# Patient Record
Sex: FEMALE | Race: WHITE | NOT HISPANIC OR LATINO | Employment: OTHER | URBAN - METROPOLITAN AREA
[De-identification: names, ages, dates, MRNs, and addresses within clinical notes are randomized per-mention and may not be internally consistent; named-entity substitution may affect disease eponyms.]

---

## 2019-10-04 DIAGNOSIS — G89.4 CHRONIC PAIN SYNDROME: ICD-10-CM

## 2019-10-04 DIAGNOSIS — R10.9 ABDOMINAL CRAMPING: Primary | ICD-10-CM

## 2019-10-04 DIAGNOSIS — I10 HYPERTENSION, UNSPECIFIED TYPE: ICD-10-CM

## 2019-10-04 DIAGNOSIS — L02.92 BOIL: ICD-10-CM

## 2019-10-04 RX ORDER — OXYCODONE HYDROCHLORIDE 15 MG/1
15 TABLET ORAL EVERY 6 HOURS PRN
Qty: 120 TABLET | Refills: 0 | Status: SHIPPED | OUTPATIENT
Start: 2019-10-04 | End: 2019-11-05 | Stop reason: SDUPTHER

## 2019-10-04 RX ORDER — CLINDAMYCIN HYDROCHLORIDE 300 MG/1
300 CAPSULE ORAL EVERY 8 HOURS
Qty: 30 CAPSULE | Refills: 0 | Status: SHIPPED | OUTPATIENT
Start: 2019-10-04 | End: 2019-10-14

## 2019-10-04 RX ORDER — LISINOPRIL 20 MG/1
20 TABLET ORAL DAILY
Qty: 90 TABLET | Refills: 0 | Status: SHIPPED | OUTPATIENT
Start: 2019-10-04 | End: 2019-11-05 | Stop reason: SDUPTHER

## 2019-10-04 RX ORDER — LISINOPRIL 20 MG/1
1 TABLET ORAL DAILY
COMMUNITY
End: 2019-10-04 | Stop reason: SDUPTHER

## 2019-10-04 RX ORDER — DICYCLOMINE HYDROCHLORIDE 20 MG/1
20 TABLET ORAL 3 TIMES DAILY
Qty: 21 TABLET | Refills: 0 | Status: SHIPPED | OUTPATIENT
Start: 2019-10-04 | End: 2019-10-11

## 2019-10-04 RX ORDER — OXYCODONE HYDROCHLORIDE 15 MG/1
1 TABLET ORAL EVERY 4 HOURS PRN
COMMUNITY
End: 2019-10-04 | Stop reason: SDUPTHER

## 2019-10-04 NOTE — TELEPHONE ENCOUNTER
----- Message from Genie Sanchez sent at 10/4/2019 10:00 AM CDT -----  Contact: Marie Damon  Patient has a big boil on her side and she would like to know can she get something called in for that along with her refills on : Oxycodone 15 MG, Lisinopril 20MG and Dicyclomine 20MG tablet  [facility] in [place]  Pt# [phone]

## 2019-10-04 NOTE — TELEPHONE ENCOUNTER
Patient c/o boil on her side, is in pain.  Has to lay on her left side.  Waiting for boil to come to head so she can squeeze it.  On her hip near her buttock.  Taking extra pain medicine due to pain from boil and having to lay on her left side.  Size of 3 quarters together.  Is hard, hurts to touch.  x1 week.  Started as a small knot.  No fever. To dr winston for review -DN

## 2019-10-04 NOTE — TELEPHONE ENCOUNTER
Sent clinda for boil. Needs to use her pain meds as prescribed. Will not be sending any increased dose.

## 2019-10-15 ENCOUNTER — TELEPHONE (OUTPATIENT)
Dept: FAMILY MEDICINE | Facility: CLINIC | Age: 44
End: 2019-10-15

## 2019-10-15 NOTE — TELEPHONE ENCOUNTER
----- Message from Kimberli Richardson sent at 10/15/2019 11:34 AM CDT -----  Has a question about her appointment

## 2019-11-05 DIAGNOSIS — G89.4 CHRONIC PAIN SYNDROME: ICD-10-CM

## 2019-11-05 DIAGNOSIS — F41.9 ANXIETY: Primary | ICD-10-CM

## 2019-11-05 DIAGNOSIS — I10 HYPERTENSION, UNSPECIFIED TYPE: ICD-10-CM

## 2019-11-05 RX ORDER — DIAZEPAM 10 MG/1
10 TABLET ORAL 3 TIMES DAILY
Refills: 2 | COMMUNITY
Start: 2019-09-18 | End: 2019-11-05 | Stop reason: SDUPTHER

## 2019-11-05 RX ORDER — OXYCODONE HYDROCHLORIDE 15 MG/1
15 TABLET ORAL EVERY 6 HOURS PRN
Qty: 120 TABLET | Refills: 0 | Status: SHIPPED | OUTPATIENT
Start: 2019-11-05 | End: 2019-11-26 | Stop reason: SDUPTHER

## 2019-11-05 RX ORDER — DIAZEPAM 10 MG/1
10 TABLET ORAL 3 TIMES DAILY
Qty: 90 TABLET | Refills: 0 | Status: SHIPPED | OUTPATIENT
Start: 2019-11-05 | End: 2019-11-26 | Stop reason: SDUPTHER

## 2019-11-05 RX ORDER — LISINOPRIL 20 MG/1
20 TABLET ORAL DAILY
Qty: 90 TABLET | Refills: 0 | Status: SHIPPED | OUTPATIENT
Start: 2019-11-05 | End: 2020-01-03 | Stop reason: SDUPTHER

## 2019-11-05 NOTE — TELEPHONE ENCOUNTER
----- Message from Anderson Iqbal sent at 11/4/2019  9:22 AM CST -----  Pt was schedule for tomorrow but her mom fell and is in the hosp she rescheduled for next week. Pt is due for refills valium, bp med, oxycodone please call in   [facility] [place]  Pt [phone]

## 2019-11-06 NOTE — TELEPHONE ENCOUNTER
The patient's prescription has been approved and sent to   [facility] - [facility] - [address]  [address]  [place] [address]  Phone: [phone] Fax: [phone]

## 2019-11-15 ENCOUNTER — TELEPHONE (OUTPATIENT)
Dept: FAMILY MEDICINE | Facility: CLINIC | Age: 44
End: 2019-11-15

## 2019-11-15 NOTE — TELEPHONE ENCOUNTER
Spoke with pt - Pt informed that she has not been seen since 7/2019 and in accordance with medicaid and clinic policies she needs to bees seen every 3 months if she is taking controlled medications. Pt states that she will be in on 11/26/19 for her appointment because her aunt will be down to help take of her mother who is now staying with her.

## 2019-11-26 ENCOUNTER — OFFICE VISIT (OUTPATIENT)
Dept: FAMILY MEDICINE | Facility: CLINIC | Age: 44
End: 2019-11-26
Payer: MEDICAID

## 2019-11-26 VITALS
HEIGHT: 72 IN | SYSTOLIC BLOOD PRESSURE: 130 MMHG | OXYGEN SATURATION: 98 % | BODY MASS INDEX: 27.82 KG/M2 | DIASTOLIC BLOOD PRESSURE: 86 MMHG | HEART RATE: 90 BPM | WEIGHT: 205.38 LBS

## 2019-11-26 DIAGNOSIS — I10 ESSENTIAL HYPERTENSION: ICD-10-CM

## 2019-11-26 DIAGNOSIS — R11.2 NON-INTRACTABLE VOMITING WITH NAUSEA, UNSPECIFIED VOMITING TYPE: ICD-10-CM

## 2019-11-26 DIAGNOSIS — G89.4 CHRONIC PAIN SYNDROME: ICD-10-CM

## 2019-11-26 DIAGNOSIS — R10.30 LOWER ABDOMINAL PAIN: ICD-10-CM

## 2019-11-26 DIAGNOSIS — F41.9 ANXIETY: ICD-10-CM

## 2019-11-26 LAB
BILIRUB UR QL STRIP: POSITIVE
GLUCOSE UR QL STRIP: NEGATIVE
KETONES UR QL STRIP: POSITIVE
LEUKOCYTE ESTERASE UR QL STRIP: NEGATIVE
PH, POC UA: 6
POC BLOOD, URINE: POSITIVE
POC NITRATES, URINE: NEGATIVE
PROT UR QL STRIP: POSITIVE
SP GR UR STRIP: 1.02 (ref 1–1.03)
UROBILINOGEN UR STRIP-ACNC: ABNORMAL (ref 0.1–1.1)

## 2019-11-26 PROCEDURE — 81003 POCT URINALYSIS, DIPSTICK, AUTOMATED, W/O SCOPE: ICD-10-PCS | Mod: 59,QW,S$GLB, | Performed by: FAMILY MEDICINE

## 2019-11-26 PROCEDURE — 99214 OFFICE O/P EST MOD 30 MIN: CPT | Mod: 25,S$GLB,, | Performed by: FAMILY MEDICINE

## 2019-11-26 PROCEDURE — 99214 PR OFFICE/OUTPT VISIT, EST, LEVL IV, 30-39 MIN: ICD-10-PCS | Mod: 25,S$GLB,, | Performed by: FAMILY MEDICINE

## 2019-11-26 PROCEDURE — 81003 URINALYSIS AUTO W/O SCOPE: CPT | Mod: 59,QW,S$GLB, | Performed by: FAMILY MEDICINE

## 2019-11-26 RX ORDER — PROMETHAZINE HYDROCHLORIDE 25 MG/ML
25 INJECTION, SOLUTION INTRAMUSCULAR; INTRAVENOUS
Status: COMPLETED | OUTPATIENT
Start: 2019-11-26 | End: 2019-11-26

## 2019-11-26 RX ORDER — LISINOPRIL 10 MG/1
10 TABLET ORAL DAILY
COMMUNITY
End: 2019-11-26 | Stop reason: SDUPTHER

## 2019-11-26 RX ORDER — NALOXONE HYDROCHLORIDE 4 MG/.1ML
SPRAY NASAL
COMMUNITY
End: 2022-01-11 | Stop reason: SDUPTHER

## 2019-11-26 RX ADMIN — PROMETHAZINE HYDROCHLORIDE 25 MG: 25 INJECTION, SOLUTION INTRAMUSCULAR; INTRAVENOUS at 09:11

## 2019-11-26 NOTE — PROGRESS NOTES
SUBJECTIVE:    Patient ID: Marie Damon is a 44 y.o. female.    Chief Complaint: Hypertension (check up); Fatigue (x 3 days); and Nausea (w/ vomitting x 3 days)    Pt here to checkup on chronic conditions.    BP is doing ok. Denies CP/SOB.     Anxiety is high.  Has a lot of stress going on.  Overwhelmed with a lot with her mom and her son.        Pt states she mad a mistake while sick looking for her meds in the middle of the night and took her mom's morphine pill and it made her throw up.  zofran has not been helping her get any better.  Had some issues last visit with stomach, but was relieved by dicyclomine and zofran a that time.  This episode seems different.   Pt reports cysts on the ovaries in the past.    No weight loss.    Pt continues to have chronic back pain. Takes oxycodone as needed for pain.  Medication allows her to do ADLs as well as helps her to take care of her total care mother and help her son that has leg amputation.     Abdominal Pain   This is a new problem. The current episode started in the past 7 days. The onset quality is sudden. The problem occurs constantly (waxes and wanes). The problem has been unchanged. Pain location: lower abdomen. The pain is at a severity of 9/10. The pain is moderate. The quality of the pain is colicky (last ate yesterday just to have something on her stomach). Associated symptoms include anorexia, belching, a fever (last night 103 F), headaches, nausea and vomiting. Pertinent negatives include no arthralgias, constipation, diarrhea, dysuria, frequency or myalgias. Associated symptoms comments: Has not slept much since this started. Relieved by: ibuprofen. The treatment provided no relief.           Past Medical History:   Diagnosis Date    Bone spur     hip     Past Surgical History:   Procedure Laterality Date    CHOLECYSTECTOMY      HYSTERECTOMY       History reviewed. No pertinent family history.    Marital Status:   Alcohol History:  reports  that she does not drink alcohol.  Tobacco History:  reports that she has been smoking cigarettes. She has never used smokeless tobacco.  Drug History:  reports that she does not use drugs.    Review of patient's allergies indicates:   Allergen Reactions    Pcn [penicillins] Hives    Morphine Other (See Comments)     heartburn       Current Outpatient Medications:     diazePAM (VALIUM) 10 MG Tab, Take 1 tablet (10 mg total) by mouth 3 (three) times daily., Disp: 90 tablet, Rfl: 0    lisinopril 10 MG tablet, Take 1 tablet (10 mg total) by mouth once daily., Disp: 90 tablet, Rfl: 1    naloxone (NARCAN) 4 mg/actuation Spry, each nostril if not breathing or not responsive, Disp: , Rfl:     [START ON 12/4/2019] oxyCODONE (ROXICODONE) 15 MG Tab, Take 1 tablet (15 mg total) by mouth every 6 (six) hours as needed., Disp: 120 tablet, Rfl: 0    gabapentin (NEURONTIN) 400 MG capsule, Take 400 mg by mouth 2 (two) times daily as needed (pain from bone spur on hip)., Disp: , Rfl:     HYDROcodone-acetaminophen (NORCO)  mg per tablet, Take 1 tablet by mouth every 4 (four) hours as needed for Pain., Disp: 18 tablet, Rfl: 0    ibuprofen (ADVIL,MOTRIN) 800 MG tablet, Take 1 tablet (800 mg total) by mouth every 6 (six) hours as needed for Pain., Disp: 30 tablet, Rfl: 0    lisinopril (PRINIVIL,ZESTRIL) 20 MG tablet, Take 1 tablet (20 mg total) by mouth once daily. (Patient taking differently: Take 10 mg by mouth once daily. ), Disp: 90 tablet, Rfl: 0    promethazine (PHENERGAN) 25 MG tablet, Take 1 tablet (25 mg total) by mouth every 6 (six) hours as needed for Nausea., Disp: 30 tablet, Rfl: 0    Review of Systems   Constitutional: Positive for fever (last night 103 F). Negative for appetite change, fatigue and unexpected weight change.   Respiratory: Negative for cough, chest tightness, shortness of breath and wheezing.    Cardiovascular: Negative for chest pain and leg swelling.   Gastrointestinal: Positive for  abdominal pain, anorexia, nausea and vomiting. Negative for constipation and diarrhea.        -heartburn   Genitourinary: Negative for difficulty urinating, dysuria, frequency and urgency.   Musculoskeletal: Positive for back pain. Negative for arthralgias, myalgias and neck pain.   Skin: Negative for rash.   Neurological: Positive for headaches. Negative for dizziness, weakness and numbness.   Hematological: Does not bruise/bleed easily.   Psychiatric/Behavioral: Positive for sleep disturbance (due to pain and viral symptoms). Negative for dysphoric mood and suicidal ideas. The patient is nervous/anxious.    All other systems reviewed and are negative.         Objective:      Vitals:    11/26/19 0836   BP: 130/86   Pulse: 90   SpO2: 98%   Weight: 93.2 kg (205 lb 6.4 oz)   Height: 6' (1.829 m)     Body mass index is 27.86 kg/m².  Physical Exam   Constitutional: She is oriented to person, place, and time. She appears well-developed and well-nourished. No distress.   overweight   HENT:   Head: Normocephalic and atraumatic.   Neck: Neck supple. No thyromegaly present.   Cardiovascular: Normal rate, regular rhythm and normal heart sounds. Exam reveals no friction rub.   No murmur heard.  Pulmonary/Chest: Effort normal and breath sounds normal. She has no wheezes. She has no rales.   Abdominal: Soft. She exhibits no distension. Bowel sounds are decreased. There is no hepatosplenomegaly. There is tenderness in the suprapubic area. There is no rebound and no guarding.   Musculoskeletal: She exhibits no edema.   Lymphadenopathy:     She has no cervical adenopathy.   Neurological: She is alert and oriented to person, place, and time.   Skin: Skin is warm and dry. No rash noted.   Psychiatric: She has a normal mood and affect. Her speech is normal and behavior is normal. Judgment and thought content normal. She is attentive.   Vitals reviewed.        Assessment:       1. Lower abdominal pain    2. Non-intractable vomiting  with nausea, unspecified vomiting type    3. Essential hypertension    4. Chronic pain syndrome    5. Anxiety         Plan:       Lower abdominal pain  Comments:  Acute. Will check for UTI. Hx ovarian cyst. Will also get pelvic ultrasound  Orders:  -     POCT Urinalysis, Dipstick, Automated, W/O Scope  -     Urine culture; Future; Expected date: 11/26/2019  -     US Pelvis Complete Non OB; Future; Expected date: 11/26/2019    Non-intractable vomiting with nausea, unspecified vomiting type  Comments:  Acute. Will treat with phenergan as zofran no longer effective. Pt to go to ER if unable to maintain hydration for any reason  Orders:  -     promethazine (PHENERGAN) 25 MG tablet; Take 1 tablet (25 mg total) by mouth every 6 (six) hours as needed for Nausea.  Dispense: 30 tablet; Refill: 0  -     promethazine injection 25 mg    Essential hypertension  Comments:  Blood pressure controlled. Will continue to monitor.  Orders:  -     lisinopril 10 MG tablet; Take 1 tablet (10 mg total) by mouth once daily.  Dispense: 90 tablet; Refill: 1    Chronic pain syndrome  Comments:  Back pain controlled. Will continue to monitor pain control and function.  Orders:  -     oxyCODONE (ROXICODONE) 15 MG Tab; Take 1 tablet (15 mg total) by mouth every 6 (six) hours as needed.  Dispense: 120 tablet; Refill: 0  -     Misc Sendout Test, Non-Blood Non -Blood Pain Mgmnt Drug Tox Monitoring 1; [facility] Code 36060; Future; Expected date: 11/26/2019  -     Urine culture; Future; Expected date: 11/26/2019    Anxiety  Comments:  Controlled. Will continue to monitor symtpoms  Orders:  -     diazePAM (VALIUM) 10 MG Tab; Take 1 tablet (10 mg total) by mouth 3 (three) times daily.  Dispense: 90 tablet; Refill: 0    Chronic Pain Notes:  Have discussed the risks and benefits of chronic narcotic therapy including pain control, dependence, and addiction.  The patient is aware and accepts the risks and benefits.  Have evaluated the patient's risk for  naloxone therapy.  The patient is at moderate risk for an adverse event with narcotic therapy due to concurrent benzodiazepine prescription, so a naloxone prescription has been given with written and verbal instructions. Patient is aware of the risk of taking narcotics combined with benzodiazepines/muscle relaxers/hypnotics, including but not limited to breathing difficulties, coma, and death; accepts the risks; and agrees to use medications only as prescribed.    Follow up in about 3 months (around 2/26/2020) for HTN, Anxiety.

## 2019-11-27 RX ORDER — OXYCODONE HYDROCHLORIDE 15 MG/1
15 TABLET ORAL EVERY 6 HOURS PRN
Qty: 120 TABLET | Refills: 0 | Status: SHIPPED | OUTPATIENT
Start: 2019-12-04 | End: 2020-01-03 | Stop reason: SDUPTHER

## 2019-11-27 RX ORDER — LISINOPRIL 10 MG/1
10 TABLET ORAL DAILY
Qty: 90 TABLET | Refills: 1 | Status: SHIPPED | OUTPATIENT
Start: 2019-11-27 | End: 2020-01-03 | Stop reason: SDUPTHER

## 2019-11-27 RX ORDER — DIAZEPAM 10 MG/1
10 TABLET ORAL 3 TIMES DAILY
Qty: 90 TABLET | Refills: 0 | Status: SHIPPED | OUTPATIENT
Start: 2019-11-30 | End: 2020-01-03 | Stop reason: SDUPTHER

## 2019-11-27 RX ORDER — PROMETHAZINE HYDROCHLORIDE 25 MG/1
25 TABLET ORAL EVERY 6 HOURS PRN
Qty: 30 TABLET | Refills: 0 | Status: SHIPPED | OUTPATIENT
Start: 2019-11-27 | End: 2020-04-02 | Stop reason: SDUPTHER

## 2019-12-03 ENCOUNTER — TELEPHONE (OUTPATIENT)
Dept: FAMILY MEDICINE | Facility: CLINIC | Age: 44
End: 2019-12-03

## 2019-12-03 DIAGNOSIS — Z79.899 ENCOUNTER FOR LONG-TERM (CURRENT) USE OF HIGH-RISK MEDICATION: Primary | ICD-10-CM

## 2019-12-03 DIAGNOSIS — R10.30 LOWER ABDOMINAL PAIN: ICD-10-CM

## 2019-12-03 DIAGNOSIS — G89.4 CHRONIC PAIN SYNDROME: ICD-10-CM

## 2019-12-03 DIAGNOSIS — F41.9 ANXIETY: ICD-10-CM

## 2019-12-03 NOTE — TELEPHONE ENCOUNTER
This patient had urine tests ordered and collected on 11/26. [employer] did not pickup the specimens on that day. The patient needs to recollect at a [facility] near to where she lives in [place].  I have been unable to reach her or her mother.  They both have not set up their VM.

## 2019-12-03 NOTE — TELEPHONE ENCOUNTER
Dr Sun, Please order whichever PM screen you wish to order. She had  Done the oral screen.  Mother Yazmin says that she will give her the message to have her urine labs recollected.  I emphasized that she has to have the tests done soon.   Her next OV is in March.  Dr Sun says that she must have labs redone before that OV.

## 2019-12-12 ENCOUNTER — TELEPHONE (OUTPATIENT)
Dept: FAMILY MEDICINE | Facility: CLINIC | Age: 44
End: 2019-12-12

## 2019-12-23 NOTE — TELEPHONE ENCOUNTER
Spoke to patient. She will go to [facility] in [facility].  RemindMe created//ba

## 2020-01-03 DIAGNOSIS — F41.9 ANXIETY: ICD-10-CM

## 2020-01-03 DIAGNOSIS — I10 ESSENTIAL HYPERTENSION: ICD-10-CM

## 2020-01-03 DIAGNOSIS — G89.4 CHRONIC PAIN SYNDROME: ICD-10-CM

## 2020-01-03 RX ORDER — LISINOPRIL 10 MG/1
10 TABLET ORAL DAILY
Qty: 90 TABLET | Refills: 1 | Status: SHIPPED | OUTPATIENT
Start: 2020-01-03 | End: 2020-01-30 | Stop reason: SDUPTHER

## 2020-01-03 RX ORDER — DIAZEPAM 10 MG/1
10 TABLET ORAL 3 TIMES DAILY
Qty: 90 TABLET | Refills: 0 | Status: SHIPPED | OUTPATIENT
Start: 2020-01-03 | End: 2020-01-30 | Stop reason: SDUPTHER

## 2020-01-03 RX ORDER — OXYCODONE HYDROCHLORIDE 15 MG/1
15 TABLET ORAL EVERY 6 HOURS PRN
Qty: 120 TABLET | Refills: 0 | Status: SHIPPED | OUTPATIENT
Start: 2020-01-03 | End: 2020-01-30 | Stop reason: SDUPTHER

## 2020-01-03 NOTE — TELEPHONE ENCOUNTER
The patient's prescription has been approved and sent to   [facility] - [facility] - [address]  [address]  [place] [address]  Phone: [phone] Fax: [phone]

## 2020-01-03 NOTE — TELEPHONE ENCOUNTER
----- Message from Trista Crespo sent at 1/3/2020  9:05 AM CST -----  Refill for Oxycodone, Lisinopril, Valium. [facility] in [place]. Pt #[phone]

## 2020-01-08 LAB
AMPHETAMINES UR QL SCN>1000 NG/ML: NEGATIVE
APPEARANCE UR: CLEAR
BACTERIA #/AREA URNS HPF: ABNORMAL /HPF
BACTERIA UR CULT: ABNORMAL
BACTERIA UR CULT: NORMAL
BARBITURATES UR QL: NEGATIVE
BENZODIAZ UR QL: POSITIVE
BILIRUB UR QL STRIP: NEGATIVE
BZE UR QL: NEGATIVE
CODEINE UR QL: ABNORMAL
COLOR UR: ABNORMAL
COMMENT: ABNORMAL
ETHANOL UR QL: NEGATIVE
GLUCOSE UR QL STRIP: NEGATIVE
HGB UR QL STRIP: NEGATIVE
HYALINE CASTS #/AREA URNS LPF: ABNORMAL /LPF
HYDROCODONE UR QL: ABNORMAL
HYDROMORPHONE UR QL: ABNORMAL
KETONES UR QL STRIP: NEGATIVE
LEUKOCYTE ESTERASE UR QL STRIP: ABNORMAL
METHADONE UR QL: NEGATIVE
METHAQUALONE UR QL: NEGATIVE
MORPHINE UR QL: POSITIVE
NITRITE UR QL STRIP: POSITIVE
OPIATES UR QL: POSITIVE
PCP UR QL: NEGATIVE
PH UR STRIP: 6.5 [PH] (ref 5–8)
PROPOXYPH UR QL: NEGATIVE
PROT UR QL STRIP: ABNORMAL
RBC #/AREA URNS HPF: ABNORMAL /HPF
SERVICE CMNT 02-IMP: ABNORMAL
SERVICE CMNT-IMP: ABNORMAL
SP GR UR STRIP: 1.02 (ref 1–1.03)
SQUAMOUS #/AREA URNS HPF: ABNORMAL /HPF
THC UR QL SCN>20 NG/ML: NEGATIVE
WBC #/AREA URNS HPF: ABNORMAL /HPF

## 2020-01-13 ENCOUNTER — TELEPHONE (OUTPATIENT)
Dept: FAMILY MEDICINE | Facility: CLINIC | Age: 45
End: 2020-01-13

## 2020-01-13 NOTE — TELEPHONE ENCOUNTER
----- Message from Daisha Peters sent at 1/13/2020  9:52 AM CST -----  Patient calling stating she needs some papers for disability stating what's wrong with her and medication she is taking please give the patient a call [phone]

## 2020-01-13 NOTE — TELEPHONE ENCOUNTER
Spoke with pt - Pt states that she needs papers sent to social security stating her disability and other information. Pt informed that she would need social security to fax over a request for records or she can drop off a form stating what social security needs. Pt verbalized and understanding and provide with the office fax number.

## 2020-01-14 ENCOUNTER — TELEPHONE (OUTPATIENT)
Dept: FAMILY MEDICINE | Facility: CLINIC | Age: 45
End: 2020-01-14

## 2020-01-14 NOTE — TELEPHONE ENCOUNTER
----- Message from Genie Sanchez sent at 1/14/2020  8:51 AM CST -----  Contact: Marie Damon  Pt says that she messed up her disability form with her son's insurance . She says that she instead needs for dr. Sun to just fill out a paper stating what medications she's on and the reasons why she's on them and then mail it to the patient please.   Pt# [phone]

## 2020-01-14 NOTE — TELEPHONE ENCOUNTER
Spoke with pt - pt audrey that she doesn't need a form filled out for disability but rather a letter stating her current medications with the reason for the medication. Pt states that we can mail the letter to her so she can take to her appointment.

## 2020-01-14 NOTE — TELEPHONE ENCOUNTER
Called pt, states she needs a letter for disability on 27th. Told her that they don't need a lot. All they need is a letter stating what she takes and why she takes them.    Let pt know this is not standard procedure for disability paperwork.  She can call [facility] and have send us what they need.     Pt states she is going to come by and  her AVS.

## 2020-01-15 ENCOUNTER — TELEPHONE (OUTPATIENT)
Dept: FAMILY MEDICINE | Facility: CLINIC | Age: 45
End: 2020-01-15

## 2020-01-15 NOTE — TELEPHONE ENCOUNTER
----- Message from Marleen Sun MD sent at 1/14/2020  7:08 PM CST -----  Please call the patient regarding her abnormal result.    1. Has pt been having some issues with urinary symptoms still? Showing signs of on U/A

## 2020-01-15 NOTE — TELEPHONE ENCOUNTER
Pt returned call - Pt asked about any uti symptoms that she may be having because her urinalysis still showed signs of infection. Pt stated that she isn't having any symptoms of a uti.    Pt stated that she called [facility] in [place] to have her u/s done but they stated they didn't have any orders. Pt asked me to call her back when the orders for u/s are in. Pt call back # [phone]

## 2020-01-15 NOTE — TELEPHONE ENCOUNTER
Please call the patient regarding her abnormal result.    1. Has pt been having some issues with urinary symptoms still? Showing signs of on U/A

## 2020-01-30 DIAGNOSIS — F41.9 ANXIETY: ICD-10-CM

## 2020-01-30 DIAGNOSIS — G89.4 CHRONIC PAIN SYNDROME: ICD-10-CM

## 2020-01-30 DIAGNOSIS — I10 ESSENTIAL HYPERTENSION: ICD-10-CM

## 2020-01-30 RX ORDER — OXYCODONE HYDROCHLORIDE 15 MG/1
15 TABLET ORAL EVERY 6 HOURS PRN
Qty: 120 TABLET | Refills: 0 | Status: SHIPPED | OUTPATIENT
Start: 2020-02-03 | End: 2020-03-02 | Stop reason: SDUPTHER

## 2020-01-30 RX ORDER — DIAZEPAM 10 MG/1
10 TABLET ORAL 3 TIMES DAILY
Qty: 90 TABLET | Refills: 0 | Status: SHIPPED | OUTPATIENT
Start: 2020-01-30 | End: 2020-03-02 | Stop reason: SDUPTHER

## 2020-01-30 RX ORDER — LISINOPRIL 10 MG/1
10 TABLET ORAL DAILY
Qty: 30 TABLET | Refills: 2 | Status: SHIPPED | OUTPATIENT
Start: 2020-01-30 | End: 2020-03-02 | Stop reason: SDUPTHER

## 2020-01-30 NOTE — TELEPHONE ENCOUNTER
----- Message from Anderson Iqbal sent at 1/30/2020 12:25 PM CST -----  Refills on bp med, anxiety med , pain med   [facility] in [place]  Pt [phone]

## 2020-01-30 NOTE — TELEPHONE ENCOUNTER
The patient's prescription has been approved and sent to   [facility] - [address]  [address]  [place] [address]  Phone: [phone] Fax: [phone]

## 2020-03-02 DIAGNOSIS — F41.9 ANXIETY: ICD-10-CM

## 2020-03-02 DIAGNOSIS — G89.4 CHRONIC PAIN SYNDROME: ICD-10-CM

## 2020-03-02 DIAGNOSIS — I10 ESSENTIAL HYPERTENSION: ICD-10-CM

## 2020-03-02 RX ORDER — DIAZEPAM 10 MG/1
10 TABLET ORAL 3 TIMES DAILY
Qty: 90 TABLET | Refills: 0 | Status: SHIPPED | OUTPATIENT
Start: 2020-03-02 | End: 2020-04-02 | Stop reason: SDUPTHER

## 2020-03-02 RX ORDER — OXYCODONE HYDROCHLORIDE 15 MG/1
15 TABLET ORAL EVERY 6 HOURS PRN
Qty: 120 TABLET | Refills: 0 | Status: SHIPPED | OUTPATIENT
Start: 2020-03-02 | End: 2020-04-02 | Stop reason: SDUPTHER

## 2020-03-02 RX ORDER — LISINOPRIL 10 MG/1
10 TABLET ORAL DAILY
Qty: 30 TABLET | Refills: 2 | Status: SHIPPED | OUTPATIENT
Start: 2020-03-02 | End: 2020-04-02 | Stop reason: SDUPTHER

## 2020-03-02 NOTE — TELEPHONE ENCOUNTER
----- Message from Trista Crespo sent at 3/2/2020 12:22 PM CST -----  Refill for Lisinopril, Valium, Oxycodone. [facility] in [place]. Pt #[phone]

## 2020-03-23 ENCOUNTER — TELEPHONE (OUTPATIENT)
Dept: FAMILY MEDICINE | Facility: CLINIC | Age: 45
End: 2020-03-23

## 2020-03-23 NOTE — TELEPHONE ENCOUNTER
----- Message from Genie Sanchez sent at 3/23/2020  3:20 PM CDT -----  Contact: Marie Damon  Pt needs the nurse to give her a call back, she didn't tell me specifically why.   Pt# [phone]

## 2020-03-23 NOTE — TELEPHONE ENCOUNTER
Spoke with pt  Pt requesting refill on pt pain medication although not due til next week due to the covid 19 lock down. Pt informed that the covid 19 lock down doesn't relate to medication runs and grocery store runs. Pt informed that the pharmacy will be open next week to  medications and if that changes then she can call back. [place]

## 2020-04-02 DIAGNOSIS — I10 ESSENTIAL HYPERTENSION: ICD-10-CM

## 2020-04-02 DIAGNOSIS — R11.2 NON-INTRACTABLE VOMITING WITH NAUSEA, UNSPECIFIED VOMITING TYPE: ICD-10-CM

## 2020-04-02 DIAGNOSIS — F41.9 ANXIETY: ICD-10-CM

## 2020-04-02 DIAGNOSIS — G89.4 CHRONIC PAIN SYNDROME: ICD-10-CM

## 2020-04-02 RX ORDER — DIAZEPAM 10 MG/1
10 TABLET ORAL 3 TIMES DAILY
Qty: 90 TABLET | Refills: 0 | Status: SHIPPED | OUTPATIENT
Start: 2020-04-02 | End: 2020-04-29 | Stop reason: SDUPTHER

## 2020-04-02 RX ORDER — LISINOPRIL 10 MG/1
10 TABLET ORAL DAILY
Qty: 30 TABLET | Refills: 2 | Status: SHIPPED | OUTPATIENT
Start: 2020-04-02 | End: 2020-04-29 | Stop reason: SDUPTHER

## 2020-04-02 RX ORDER — PROMETHAZINE HYDROCHLORIDE 25 MG/1
25 TABLET ORAL EVERY 6 HOURS PRN
Qty: 30 TABLET | Refills: 0 | Status: SHIPPED | OUTPATIENT
Start: 2020-04-02 | End: 2020-04-29 | Stop reason: SDUPTHER

## 2020-04-02 RX ORDER — OXYCODONE HYDROCHLORIDE 15 MG/1
15 TABLET ORAL EVERY 6 HOURS PRN
Qty: 120 TABLET | Refills: 0 | Status: SHIPPED | OUTPATIENT
Start: 2020-04-02 | End: 2020-04-29 | Stop reason: SDUPTHER

## 2020-04-02 NOTE — TELEPHONE ENCOUNTER
Pt requesting refill of lisinopril, valium and oxycodone. Pt states that she her migraines are causing her to be nauseous and she is requesting something for nausea to [facility]    Pt requesting that Dr. Sun send them early because pt mother goes out once a day and pt mother is going out for a doctor alex this morning and she needs to pick it up on her way home.

## 2020-04-02 NOTE — TELEPHONE ENCOUNTER
----- Message from Fior Simpson LPN sent at 4/1/2020  3:33 PM CDT -----  Contact: Marie Damon      ----- Message -----  From: Genie Sanchez  Sent: 4/1/2020   3:25 PM CDT  To: Chet Banuelos Staff    Pt needs the nurse to give her a call, she says that she has a few questions to ask. Pt# [phone] or  [phone]

## 2020-04-29 ENCOUNTER — OFFICE VISIT (OUTPATIENT)
Dept: FAMILY MEDICINE | Facility: CLINIC | Age: 45
End: 2020-04-29
Payer: MEDICAID

## 2020-04-29 DIAGNOSIS — R11.2 NON-INTRACTABLE VOMITING WITH NAUSEA, UNSPECIFIED VOMITING TYPE: ICD-10-CM

## 2020-04-29 DIAGNOSIS — Z13.89 SCREENING FOR BLOOD OR PROTEIN IN URINE: ICD-10-CM

## 2020-04-29 DIAGNOSIS — K08.89 TOOTH PAIN: Primary | ICD-10-CM

## 2020-04-29 DIAGNOSIS — I10 ESSENTIAL HYPERTENSION: ICD-10-CM

## 2020-04-29 DIAGNOSIS — F41.9 ANXIETY: ICD-10-CM

## 2020-04-29 DIAGNOSIS — G89.4 CHRONIC PAIN SYNDROME: ICD-10-CM

## 2020-04-29 DIAGNOSIS — Z13.6 SCREENING FOR ISCHEMIC HEART DISEASE (IHD): ICD-10-CM

## 2020-04-29 PROCEDURE — 99214 PR OFFICE/OUTPT VISIT, EST, LEVL IV, 30-39 MIN: ICD-10-PCS | Mod: 95,,, | Performed by: FAMILY MEDICINE

## 2020-04-29 PROCEDURE — 99214 OFFICE O/P EST MOD 30 MIN: CPT | Mod: 95,,, | Performed by: FAMILY MEDICINE

## 2020-04-29 RX ORDER — PROMETHAZINE HYDROCHLORIDE 25 MG/1
25 TABLET ORAL EVERY 6 HOURS PRN
Qty: 30 TABLET | Refills: 0 | Status: SHIPPED | OUTPATIENT
Start: 2020-04-29 | End: 2020-05-26 | Stop reason: SDUPTHER

## 2020-04-29 RX ORDER — FLUCONAZOLE 150 MG/1
150 TABLET ORAL DAILY
Qty: 1 TABLET | Refills: 0 | Status: SHIPPED | OUTPATIENT
Start: 2020-04-29 | End: 2020-04-30

## 2020-04-29 RX ORDER — LISINOPRIL 10 MG/1
10 TABLET ORAL DAILY
Qty: 30 TABLET | Refills: 2 | Status: SHIPPED | OUTPATIENT
Start: 2020-04-29 | End: 2020-05-26 | Stop reason: SDUPTHER

## 2020-04-29 RX ORDER — DIAZEPAM 10 MG/1
10 TABLET ORAL 3 TIMES DAILY
Qty: 90 TABLET | Refills: 0 | Status: SHIPPED | OUTPATIENT
Start: 2020-04-29 | End: 2020-05-26 | Stop reason: SDUPTHER

## 2020-04-29 RX ORDER — CLINDAMYCIN HYDROCHLORIDE 300 MG/1
300 CAPSULE ORAL 3 TIMES DAILY
Qty: 30 CAPSULE | Refills: 0 | Status: SHIPPED | OUTPATIENT
Start: 2020-04-29 | End: 2020-05-09

## 2020-04-29 RX ORDER — OXYCODONE HYDROCHLORIDE 15 MG/1
15 TABLET ORAL EVERY 6 HOURS PRN
Qty: 120 TABLET | Refills: 0 | Status: SHIPPED | OUTPATIENT
Start: 2020-04-29 | End: 2020-05-26 | Stop reason: SDUPTHER

## 2020-04-29 NOTE — PROGRESS NOTES
Subjective:        The chief complaint leading to consultation is: Tooth pain, Anxiety, HTN chronic back pain  The patient location is:  Home  Visit type: Virtual visit with synchronous audio/video or audio only  This was a phone conversation in lieu of in-person visit due to the coronavirus emergency. Patient acknowledged and agreed to the telephone encounter.     Pt talked to in order to checkup on chronic conditions.    Pt has been taking BP regularly.  Doesn't have a BP machine, but her mother might.  Denies CP/SOB.     Anxiety is doing ok.   high.  Has a lot of stress going on.   Overwhelmed with a lot with her mom and her son.      Pt continues to have chronic back pain. Takes oxycodone as needed for pain.  Medication allows her to do ADLs as well as helps her to take care of her total care mother and help her son that has leg amputation.     Pt has been having trouble with her teeth bothering her off and on.  Has been running fever off and on, taking tylenol as needed. Also using listerine as needed. Been bothering her for 2 weeks. Starting to get headaches and and sick to her stomach. Has broken teeth in the left top.        Past Surgical History:   Procedure Laterality Date    CHOLECYSTECTOMY      HYSTERECTOMY       Past Medical History:   Diagnosis Date    Bone spur     hip     History reviewed. No pertinent family history.     Social History:   Marital Status:   Alcohol History:  reports that she does not drink alcohol.  Tobacco History:  reports that she has been smoking cigarettes. She has never used smokeless tobacco.  Drug History:  reports that she does not use drugs.    Review of patient's allergies indicates:   Allergen Reactions    Pcn [penicillins] Hives    Morphine Other (See Comments)     heartburn       Current Outpatient Medications   Medication Sig Dispense Refill    clindamycin (CLEOCIN) 300 MG capsule Take 1 capsule (300 mg total) by mouth 3 (three) times daily. for 10  days 30 capsule 0    diazePAM (VALIUM) 10 MG Tab Take 1 tablet (10 mg total) by mouth 3 (three) times daily. 90 tablet 0    lisinopriL 10 MG tablet Take 1 tablet (10 mg total) by mouth once daily. 30 tablet 2    naloxone (NARCAN) 4 mg/actuation Spry each nostril if not breathing or not responsive      oxyCODONE (ROXICODONE) 15 MG Tab Take 1 tablet (15 mg total) by mouth every 6 (six) hours as needed. 120 tablet 0    promethazine (PHENERGAN) 25 MG tablet Take 1 tablet (25 mg total) by mouth every 6 (six) hours as needed for Nausea. 30 tablet 0     No current facility-administered medications for this visit.        Review of Systems   Constitutional: Negative for appetite change, fatigue, fever and unexpected weight change.   HENT:        +tooth pain   Respiratory: Negative for cough, chest tightness, shortness of breath and wheezing.    Cardiovascular: Negative for chest pain and leg swelling.   Gastrointestinal: Negative for abdominal pain, constipation, nausea and vomiting.        -heartburn   Genitourinary: Negative for difficulty urinating, dysuria, frequency and urgency.   Musculoskeletal: Negative for arthralgias, back pain, myalgias and neck pain.   Skin: Negative for rash.   Neurological: Negative for dizziness, weakness, numbness and headaches.   Hematological: Does not bruise/bleed easily.   Psychiatric/Behavioral: Negative for dysphoric mood, sleep disturbance and suicidal ideas. The patient is not nervous/anxious.    All other systems reviewed and are negative.        Objective:        Physical Exam:   Physical Exam         Assessment:       1. Tooth pain    2. Essential hypertension    3. Anxiety    4. Non-intractable vomiting with nausea, unspecified vomiting type    5. Chronic pain syndrome    6. Screening for ischemic heart disease (IHD)    7. Screening for blood or protein in urine      Plan:   Tooth pain  Comments:  Acute. Will treat with Anbx. To use listerine BID. Advised pt to see dentist  ASAP for eval and treatment  Orders:  -     fluconazole (DIFLUCAN) 150 MG Tab; Take 1 tablet (150 mg total) by mouth once daily. for 1 day  Dispense: 1 tablet; Refill: 0  -     clindamycin (CLEOCIN) 300 MG capsule; Take 1 capsule (300 mg total) by mouth 3 (three) times daily. for 10 days  Dispense: 30 capsule; Refill: 0    Essential hypertension  Comments:  Active. To continue current medication regimen. Will continue to monitor symptoms  Orders:  -     lisinopriL 10 MG tablet; Take 1 tablet (10 mg total) by mouth once daily.  Dispense: 30 tablet; Refill: 2  -     Lipid Panel; Future; Expected date: 05/01/2020  -     Comprehensive metabolic panel; Future; Expected date: 05/01/2020  -     Urinalysis; Future; Expected date: 05/01/2020  -     Microalbumin/creatinine urine ratio; Future; Expected date: 05/01/2020  -     CBC auto differential; Future; Expected date: 05/01/2020    Anxiety  Comments:  Controlled. Will continue to monitor symptoms  Orders:  -     diazePAM (VALIUM) 10 MG Tab; Take 1 tablet (10 mg total) by mouth 3 (three) times daily.  Dispense: 90 tablet; Refill: 0    Non-intractable vomiting with nausea, unspecified vomiting type  Comments:  Pain controlled. Will continue to monitor function  Orders:  -     promethazine (PHENERGAN) 25 MG tablet; Take 1 tablet (25 mg total) by mouth every 6 (six) hours as needed for Nausea.  Dispense: 30 tablet; Refill: 0    Chronic pain syndrome  -     oxyCODONE (ROXICODONE) 15 MG Tab; Take 1 tablet (15 mg total) by mouth every 6 (six) hours as needed.  Dispense: 120 tablet; Refill: 0    Screening for ischemic heart disease (IHD)  -     Lipid Panel; Future; Expected date: 05/01/2020    Screening for blood or protein in urine  -     Urinalysis; Future; Expected date: 05/01/2020  -     Microalbumin/creatinine urine ratio; Future; Expected date: 05/01/2020    Labs have been ordered for monitoring of chronic conditions, just before next visit.    Follow up in about 3 months  (around 7/29/2020) for HTN, Anxiety, Arthritis, Chronic Pain.    Total time spent with patient: 20 min    Each patient to whom he or she provides medical services by telemedicine is:  (1) informed of the relationship between the physician and patient and the respective role of any other health care provider with respect to management of the patient; and (2) notified that he or she may decline to receive medical services by telemedicine and may withdraw from such care at any time.    This note was created using [employer] voice recognition software that occasionally misinterprets phrases or words.

## 2020-05-14 ENCOUNTER — TELEPHONE (OUTPATIENT)
Dept: FAMILY MEDICINE | Facility: CLINIC | Age: 45
End: 2020-05-14

## 2020-05-14 NOTE — TELEPHONE ENCOUNTER
----- Message from Zac Olguin LPN sent at 5/14/2020  9:24 AM CDT -----  Please schedule  ----- Message -----  From: Marleen Sun MD  Sent: 5/1/2020   7:34 PM CDT  To: Zac Olguin LPN    3m OV(HTN, Anxiety, Arthritis, Chronic Pain)

## 2020-05-14 NOTE — TELEPHONE ENCOUNTER
Spoke to pt to see about gettingf pt scheduled for her 3m OV(HTN, Anxiety, Arthritis, Chronic Pain). I was able to schedule pt for 8/12. Pt also wanted to reschedule her sons canceled appt from 4/23, I was able to reschedule his appt on the same date

## 2020-05-26 ENCOUNTER — TELEPHONE (OUTPATIENT)
Dept: FAMILY MEDICINE | Facility: CLINIC | Age: 45
End: 2020-05-26

## 2020-05-26 DIAGNOSIS — I10 ESSENTIAL HYPERTENSION: ICD-10-CM

## 2020-05-26 DIAGNOSIS — R11.2 NON-INTRACTABLE VOMITING WITH NAUSEA, UNSPECIFIED VOMITING TYPE: ICD-10-CM

## 2020-05-26 DIAGNOSIS — G89.4 CHRONIC PAIN SYNDROME: ICD-10-CM

## 2020-05-26 DIAGNOSIS — F41.9 ANXIETY: ICD-10-CM

## 2020-05-26 RX ORDER — DIAZEPAM 10 MG/1
10 TABLET ORAL 3 TIMES DAILY
Qty: 90 TABLET | Refills: 0 | Status: SHIPPED | OUTPATIENT
Start: 2020-05-29 | End: 2020-06-26 | Stop reason: SDUPTHER

## 2020-05-26 RX ORDER — PROMETHAZINE HYDROCHLORIDE 25 MG/1
25 TABLET ORAL EVERY 6 HOURS PRN
Qty: 30 TABLET | Refills: 0 | Status: SHIPPED | OUTPATIENT
Start: 2020-05-26 | End: 2020-11-24 | Stop reason: SDUPTHER

## 2020-05-26 RX ORDER — OXYCODONE HYDROCHLORIDE 15 MG/1
15 TABLET ORAL EVERY 6 HOURS PRN
Qty: 120 TABLET | Refills: 0 | Status: SHIPPED | OUTPATIENT
Start: 2020-05-29 | End: 2020-06-26 | Stop reason: SDUPTHER

## 2020-05-26 RX ORDER — LISINOPRIL 10 MG/1
10 TABLET ORAL DAILY
Qty: 30 TABLET | Refills: 2 | Status: SHIPPED | OUTPATIENT
Start: 2020-05-26 | End: 2020-06-26 | Stop reason: SDUPTHER

## 2020-05-26 NOTE — TELEPHONE ENCOUNTER
----- Message from Zac Olguin LPN sent at 5/22/2020 11:46 AM CDT -----  Please schedule as instructed per Dr. Sun. [place]    ----- Message -----  From: Marleen Sun MD  Sent: 5/1/2020   7:34 PM CDT  To: Zac Olguin LPN    3m OV(HTN, Anxiety, Arthritis, Chronic Pain)

## 2020-05-26 NOTE — TELEPHONE ENCOUNTER
The patient's prescription has been approved and sent to   [facility] - [place] - [address]  [address]  [place] [address]  Phone: [phone] Fax: [phone]

## 2020-05-26 NOTE — TELEPHONE ENCOUNTER
Pt called in to inform the office and Dr. Sun that her ex-boyfriend, Mr. Park is probably going to call the office and leave a message lying about pt and pt son because he is upset that she and her son are still pt's  of Dr. Sun and he was terminated. Pt also requesting refills of her medication. [facility].

## 2020-06-15 ENCOUNTER — TELEPHONE (OUTPATIENT)
Dept: FAMILY MEDICINE | Facility: CLINIC | Age: 45
End: 2020-06-15

## 2020-06-15 NOTE — TELEPHONE ENCOUNTER
Spoke with pt - pt informed that per Dr. Sun she is already on valium and she has tried antidepressants in the past. Pt informed that per Dr. Sun she sounds like she is going through the beginning stages of grief and she may benefit from some counseling and/or a audio visit with Dr. Sun. Pt declined a visit at this time and states that she will seek some counseling. Pt states that she is going to be leaving to go out of state to be with her father but doesn't have an exact time frame but she will let Dr. Sun know.    Pt states that she was told by Dr. Sun that [facility] had contacted her(Dr. Sun) and was inquiring about pt and pt son medication. Pt states that she called the pharmacy and was told that Dr. Sun needs to submit a treatment plan as to why the pt is currently taking what she is taking otherwise the pharmacy will not fill pt prescriptions or pt son's prescription. [place]

## 2020-06-15 NOTE — TELEPHONE ENCOUNTER
Let her know that I am sorry for what she is going through. She is already on valium. She has tried antidepressants in the distant past before, though not all of them. We would need a visit for this (Audio is fine). It also sounds like she is going through the beginning stages of grief. She may benefit from counseling as well.

## 2020-06-15 NOTE — TELEPHONE ENCOUNTER
"----- Message from Milana Vogt MA sent at 6/15/2020 10:33 AM CDT -----  Pt called in to advise that she is having "very bad issues with herself".  She states one minute she is crying the next minute she is in a rage.  States she just found out her father is dying, and she is wondering if that could be causing the problem.  She is requesting that Dr. Sun call her in something.    Pharmacy - [facility] in [place]    Pt - [phone] or son - [phone]    "

## 2020-06-26 DIAGNOSIS — G89.4 CHRONIC PAIN SYNDROME: ICD-10-CM

## 2020-06-26 DIAGNOSIS — I10 ESSENTIAL HYPERTENSION: ICD-10-CM

## 2020-06-26 DIAGNOSIS — F41.9 ANXIETY: ICD-10-CM

## 2020-06-26 RX ORDER — DIAZEPAM 10 MG/1
10 TABLET ORAL 3 TIMES DAILY
Qty: 90 TABLET | Refills: 0 | Status: SHIPPED | OUTPATIENT
Start: 2020-06-26 | End: 2020-07-23 | Stop reason: SDUPTHER

## 2020-06-26 RX ORDER — OXYCODONE HYDROCHLORIDE 15 MG/1
15 TABLET ORAL EVERY 6 HOURS PRN
Qty: 120 TABLET | Refills: 0 | Status: SHIPPED | OUTPATIENT
Start: 2020-06-26 | End: 2020-07-23 | Stop reason: SDUPTHER

## 2020-06-26 RX ORDER — LISINOPRIL 10 MG/1
10 TABLET ORAL DAILY
Qty: 30 TABLET | Refills: 2 | Status: SHIPPED | OUTPATIENT
Start: 2020-06-26 | End: 2020-07-23 | Stop reason: SDUPTHER

## 2020-06-26 NOTE — TELEPHONE ENCOUNTER
The patient's prescription has been approved and sent to   [facility] - [place] - [address]  [address]  [place] [address]  Phone: [phone] Fax: [phone]

## 2020-06-26 NOTE — TELEPHONE ENCOUNTER
----- Message from Genie Sanchez sent at 6/26/2020  9:15 AM CDT -----  Regarding: Refill  Contact: Jaqueline Acevedo  Needs a refill on Lisinopril, vailum 10mg , oxycodone   Send to [facility]  Pt# [phone]

## 2020-07-23 DIAGNOSIS — F41.9 ANXIETY: ICD-10-CM

## 2020-07-23 DIAGNOSIS — I10 ESSENTIAL HYPERTENSION: ICD-10-CM

## 2020-07-23 DIAGNOSIS — G89.4 CHRONIC PAIN SYNDROME: ICD-10-CM

## 2020-07-23 RX ORDER — OXYCODONE HYDROCHLORIDE 15 MG/1
15 TABLET ORAL EVERY 6 HOURS PRN
Qty: 120 TABLET | Refills: 0 | Status: SHIPPED | OUTPATIENT
Start: 2020-07-23 | End: 2020-08-12 | Stop reason: SDUPTHER

## 2020-07-23 RX ORDER — DIAZEPAM 10 MG/1
10 TABLET ORAL 3 TIMES DAILY
Qty: 90 TABLET | Refills: 0 | Status: SHIPPED | OUTPATIENT
Start: 2020-07-23 | End: 2020-08-12 | Stop reason: SDUPTHER

## 2020-07-23 RX ORDER — LISINOPRIL 10 MG/1
10 TABLET ORAL DAILY
Qty: 30 TABLET | Refills: 2 | Status: SHIPPED | OUTPATIENT
Start: 2020-07-23 | End: 2020-09-10 | Stop reason: SDUPTHER

## 2020-07-23 NOTE — TELEPHONE ENCOUNTER
----- Message from Anderson Iqbal sent at 7/23/2020  9:40 AM CDT -----  Regarding: refills  Lisinpril   Valium   Oxycodone   Pharm [facility] in [place] \  Pt [phone]

## 2020-07-23 NOTE — TELEPHONE ENCOUNTER
The patient's prescription has been approved and sent to   [facility] - [place] - [address]  [address]  [place] [address]  Phone: [phone] Fax: [phone]

## 2020-08-12 ENCOUNTER — OFFICE VISIT (OUTPATIENT)
Dept: FAMILY MEDICINE | Facility: CLINIC | Age: 45
End: 2020-08-12
Payer: MEDICAID

## 2020-08-12 VITALS
TEMPERATURE: 99 F | OXYGEN SATURATION: 98 % | HEART RATE: 133 BPM | SYSTOLIC BLOOD PRESSURE: 138 MMHG | WEIGHT: 210.81 LBS | HEIGHT: 72 IN | BODY MASS INDEX: 28.55 KG/M2 | DIASTOLIC BLOOD PRESSURE: 88 MMHG

## 2020-08-12 DIAGNOSIS — F41.9 ANXIETY: ICD-10-CM

## 2020-08-12 DIAGNOSIS — R00.0 TACHYCARDIA: ICD-10-CM

## 2020-08-12 DIAGNOSIS — G89.4 CHRONIC PAIN SYNDROME: ICD-10-CM

## 2020-08-12 DIAGNOSIS — Z79.899 ENCOUNTER FOR LONG-TERM (CURRENT) USE OF HIGH-RISK MEDICATION: ICD-10-CM

## 2020-08-12 DIAGNOSIS — Z12.31 ENCOUNTER FOR SCREENING MAMMOGRAM FOR BREAST CANCER: ICD-10-CM

## 2020-08-12 DIAGNOSIS — I10 ESSENTIAL HYPERTENSION: Primary | ICD-10-CM

## 2020-08-12 PROCEDURE — 99214 OFFICE O/P EST MOD 30 MIN: CPT | Mod: S$GLB,,, | Performed by: FAMILY MEDICINE

## 2020-08-12 PROCEDURE — 99214 PR OFFICE/OUTPT VISIT, EST, LEVL IV, 30-39 MIN: ICD-10-PCS | Mod: S$GLB,,, | Performed by: FAMILY MEDICINE

## 2020-08-12 RX ORDER — DIAZEPAM 10 MG/1
10 TABLET ORAL 3 TIMES DAILY
Qty: 90 TABLET | Refills: 0 | Status: SHIPPED | OUTPATIENT
Start: 2020-08-12 | End: 2020-09-10 | Stop reason: SDUPTHER

## 2020-08-12 RX ORDER — OXYCODONE HYDROCHLORIDE 15 MG/1
15 TABLET ORAL EVERY 6 HOURS PRN
Qty: 120 TABLET | Refills: 0 | Status: SHIPPED | OUTPATIENT
Start: 2020-08-12 | End: 2020-09-10 | Stop reason: SDUPTHER

## 2020-08-12 NOTE — TELEPHONE ENCOUNTER
The patient's prescription has been approved and sent to   [facility] - [place] - [address]  [address]  [place] [address]  Phone: [phone] Fax: [phone]

## 2020-08-12 NOTE — PROGRESS NOTES
SUBJECTIVE:    Patient ID: Marie Damon is a 44 y.o. female.    Chief Complaint: Hypertension (follow up) and Bottles not brought    Pt seen in order to checkup on chronic conditions.    BP is doing ok today.  Denies CP/SOB.  Has gained 15 pounds since last visit.    Anxiety is elevated due to issues with her parents. Pt is upset because her mom just got dx with skin cancer on her arm. And her dad is dying of prostate cancer in [place]. Leaving in the morning to go to [place] to see her dad.    Pt continues to have chronic back pain. Takes oxycodone as needed for pain.  Medication allows her to do ADLs as well as helps her to take care of her total care mother and help her son that has leg amputation.      Pt has been eating a lot of ice lately.  Has been drinking coke. Has not been sleeping off and on, not much the last few days.       No visits with results within 6 Month(s) from this visit.   Latest known visit with results is:   Telephone on 12/03/2019   Component Date Value Ref Range Status    Color, UA 01/02/2020 DARK YELLOW  YELLOW Final    Appearance, UA 01/02/2020 CLEAR  CLEAR Final    Specific Gravity, UA 01/02/2020 1.017  1.001 - 1.035 Final    pH, UA 01/02/2020 6.5  5.0 - 8.0 Final    Glucose, UA 01/02/2020 NEGATIVE  NEGATIVE Final    Bilirubin, UA 01/02/2020 NEGATIVE  NEGATIVE Final    Ketones, UA 01/02/2020 NEGATIVE  NEGATIVE Final    Occult Blood UA 01/02/2020 NEGATIVE  NEGATIVE Final    Protein, UA 01/02/2020 TRACE* NEGATIVE Final    Nitrite, UA 01/02/2020 POSITIVE* NEGATIVE Final    Leukocytes, UA 01/02/2020 TRACE* NEGATIVE Final    WBC Casts, UA 01/02/2020 0-5  < OR = 5 /HPF Final    RBC Casts, UA 01/02/2020 NONE SEEN  < OR = 2 /HPF Final    Squam Epithel, UA 01/02/2020 0-5  < OR = 5 /HPF Final    Bacteria, UA 01/02/2020 FEW* NONE SEEN /HPF Final    Hyaline Casts, UA 01/02/2020 NONE SEEN  NONE SEEN /LPF Final    Service Cmt: 01/02/2020    Final    Reflexive Urine Culture 01/02/2020  CULTURE INDICATED - RESULTS TO FOLLOW   Final    Please Note 01/02/2020    Final    Amphetamines (1000ng/mL Screen) 01/02/2020 NEGATIVE   Final    Barbiturates 01/02/2020 NEGATIVE   Final    Benzodiazepines 01/02/2020 POSITIVE*  Final    Cocaine Metabolites 01/02/2020 NEGATIVE   Final    THC, Qual, Ur 01/02/2020 NEGATIVE   Final    Methadone 01/02/2020 NEGATIVE   Final    Methaqualone 01/02/2020 NEGATIVE   Final    Opiates 01/02/2020 POSITIVE*  Final    Morphine 01/02/2020 POSITIVE*  Final    Codeine 01/02/2020 NEGATIVE CONFIRMED   Final    Hydromorphone 01/02/2020 NEGATIVE CONFIRMED   Final    Hydrocodone 01/02/2020 NEGATIVE CONFIRMED   Final    Phencyclidine 01/02/2020 NEGATIVE   Final    Propoxyphene 01/02/2020 NEGATIVE   Final    Alcohol, Ethyl (U) 01/02/2020 NEGATIVE   Final    Comment 01/02/2020    Final    Urine Culture, Routine 01/02/2020    Final       Past Medical History:   Diagnosis Date    Bone spur     hip     Past Surgical History:   Procedure Laterality Date    CHOLECYSTECTOMY      HYSTERECTOMY       History reviewed. No pertinent family history.    Marital Status:   Alcohol History:  reports no history of alcohol use.  Tobacco History:  reports that she has been smoking cigarettes. She has never used smokeless tobacco.  Drug History:  reports no history of drug use.    Review of patient's allergies indicates:   Allergen Reactions    Pcn [penicillins] Hives    Morphine Other (See Comments)     heartburn       Current Outpatient Medications:     lisinopriL 10 MG tablet, Take 1 tablet (10 mg total) by mouth once daily., Disp: 30 tablet, Rfl: 2    naloxone (NARCAN) 4 mg/actuation Spry, each nostril if not breathing or not responsive, Disp: , Rfl:     oxyCODONE (ROXICODONE) 15 MG Tab, Take 1 tablet (15 mg total) by mouth every 6 (six) hours as needed., Disp: 120 tablet, Rfl: 0    promethazine (PHENERGAN) 25 MG tablet, Take 1 tablet (25 mg total) by mouth every 6 (six)  hours as needed for Nausea., Disp: 30 tablet, Rfl: 0    diazePAM (VALIUM) 10 MG Tab, Take 1 tablet (10 mg total) by mouth 3 (three) times daily., Disp: 90 tablet, Rfl: 0    Review of Systems   Constitutional: Negative for appetite change, fatigue, fever and unexpected weight change.   HENT:        +tooth pain   Respiratory: Negative for cough, chest tightness, shortness of breath and wheezing.    Cardiovascular: Negative for chest pain and leg swelling.   Gastrointestinal: Negative for abdominal pain, constipation, nausea and vomiting.        -heartburn   Genitourinary: Negative for difficulty urinating, dysuria, frequency and urgency.   Musculoskeletal: Negative for arthralgias, back pain, myalgias and neck pain.   Skin: Negative for rash.   Neurological: Negative for dizziness, weakness, numbness and headaches.   Hematological: Does not bruise/bleed easily.   Psychiatric/Behavioral: Negative for dysphoric mood, sleep disturbance and suicidal ideas. The patient is not nervous/anxious.    All other systems reviewed and are negative.         Objective:      Vitals:    08/12/20 1402   BP: 138/88   Pulse: (!) 133   Temp: 99 °F (37.2 °C)   SpO2: 98%   Weight: 95.6 kg (210 lb 12.8 oz)   Height: 6' (1.829 m)     Wt Readings from Last 3 Encounters:   08/12/20 95.6 kg (210 lb 12.8 oz)   11/26/19 93.2 kg (205 lb 6.4 oz)   10/15/18 85.3 kg (188 lb 0.8 oz)     Body mass index is 28.59 kg/m².     Physical Exam  Vitals signs reviewed.   Constitutional:       General: She is not in acute distress.     Appearance: Normal appearance. She is well-developed.      Comments: overweight   HENT:      Head: Normocephalic and atraumatic.   Neck:      Musculoskeletal: Neck supple.      Thyroid: No thyromegaly.   Cardiovascular:      Rate and Rhythm: Normal rate and regular rhythm.      Heart sounds: Normal heart sounds. No murmur. No friction rub.   Pulmonary:      Effort: Pulmonary effort is normal.      Breath sounds: Normal breath  sounds. No wheezing or rales.   Abdominal:      General: Bowel sounds are decreased. There is no distension.      Palpations: Abdomen is soft.      Tenderness: There is abdominal tenderness in the suprapubic area. There is no guarding or rebound.   Lymphadenopathy:      Cervical: No cervical adenopathy.   Skin:     General: Skin is warm and dry.      Findings: No rash.   Neurological:      Mental Status: She is alert and oriented to person, place, and time.   Psychiatric:         Attention and Perception: She is attentive.         Speech: Speech normal.         Behavior: Behavior normal.         Thought Content: Thought content normal.         Judgment: Judgment normal.           Assessment:       1. Essential hypertension    2. Anxiety    3. Tachycardia    4. Chronic pain syndrome    5. Encounter for screening mammogram for breast cancer    6. Encounter for long-term (current) use of high-risk medication         Plan:       Essential hypertension  Comments:  Controlled. Will continue to monitor    Anxiety  Comments:  Stable. Will continue to monitor symptoms    Tachycardia  Comments:  Stable. Pt advised to increase hydration and decrease caffeine consumption.    Chronic pain syndrome  Comments:  Pain controlled. Will continue to monitor the pain control and function  Orders:  -     oxyCODONE (ROXICODONE) 15 MG Tab; Take 1 tablet (15 mg total) by mouth every 6 (six) hours as needed.  Dispense: 120 tablet; Refill: 0    Encounter for screening mammogram for breast cancer  Comments:  Mammogram order sent to [facility]  Orders:  -     Mammo Digital Screening Bilat w/ Tony; Future; Expected date: 08/12/2020    Encounter for long-term (current) use of high-risk medication  -     Pain Management, Profile 7 with Confirmation; Future; Expected date: 08/12/2020    Labs have been ordered for monitoring of chronic conditions, just before next visit.    Chronic Pain Notes:  Have discussed the risks and benefits of chronic narcotic  therapy including pain control, dependence, and addiction.  The patient is aware and accepts the risks and benefits.  Patient is aware of the risk of taking narcotics combined with benzodiazepines/muscle relaxers/hypnotics, including but not limited to breathing difficulties, coma, and death; accepts the risks; and agrees to use medications only as prescribed.    Follow up in about 3 months (around 11/12/2020) for HTN, Anxiety, Chronic Pain.

## 2020-08-12 NOTE — TELEPHONE ENCOUNTER
----- Message from Sandra Yanez sent at 8/12/2020  3:57 PM CDT -----  Pt calling needs refills on Diazepam to [facility] in [place] cb # [phone]

## 2020-09-10 DIAGNOSIS — I10 ESSENTIAL HYPERTENSION: ICD-10-CM

## 2020-09-10 DIAGNOSIS — G89.4 CHRONIC PAIN SYNDROME: ICD-10-CM

## 2020-09-10 DIAGNOSIS — F41.9 ANXIETY: ICD-10-CM

## 2020-09-10 RX ORDER — DIAZEPAM 10 MG/1
10 TABLET ORAL 3 TIMES DAILY
Qty: 90 TABLET | Refills: 0 | Status: SHIPPED | OUTPATIENT
Start: 2020-09-10 | End: 2020-10-13 | Stop reason: SDUPTHER

## 2020-09-10 RX ORDER — OXYCODONE HYDROCHLORIDE 15 MG/1
15 TABLET ORAL EVERY 6 HOURS PRN
Qty: 120 TABLET | Refills: 0 | Status: SHIPPED | OUTPATIENT
Start: 2020-09-10 | End: 2020-10-13 | Stop reason: SDUPTHER

## 2020-09-10 RX ORDER — LISINOPRIL 10 MG/1
10 TABLET ORAL DAILY
Qty: 30 TABLET | Refills: 2 | Status: SHIPPED | OUTPATIENT
Start: 2020-09-10 | End: 2020-11-11 | Stop reason: SDUPTHER

## 2020-09-10 NOTE — TELEPHONE ENCOUNTER
----- Message from Sandra Yanez sent at 9/10/2020  8:31 AM CDT -----  Pt calling for refills on Oxycodone 15 mg, Valium and BP medication to [facility] in [place].  Pt says she is leaving town at 11:00 today and would like to be able to get the prescriptions before leaving. Cb # [phone]

## 2020-09-10 NOTE — TELEPHONE ENCOUNTER
The patient's prescription has been approved and sent to   [facility] - [place] - [address]  [address]  [place] [address]  Phone: [phone] Fax: [phone]

## 2020-09-24 ENCOUNTER — TELEPHONE (OUTPATIENT)
Dept: FAMILY MEDICINE | Facility: CLINIC | Age: 45
End: 2020-09-24

## 2020-09-24 NOTE — TELEPHONE ENCOUNTER
----- Message from Radha Wisdom sent at 9/24/2020  1:42 PM CDT -----  Regarding: Screening mammogram  Good afternoon.  We have attempted patient 7 times regarding her screening mammogram, negative contact.

## 2020-10-08 ENCOUNTER — TELEPHONE (OUTPATIENT)
Dept: FAMILY MEDICINE | Facility: CLINIC | Age: 45
End: 2020-10-08

## 2020-10-08 DIAGNOSIS — I10 ESSENTIAL HYPERTENSION: ICD-10-CM

## 2020-10-08 DIAGNOSIS — F41.9 ANXIETY: ICD-10-CM

## 2020-10-08 DIAGNOSIS — Z13.6 SCREENING FOR ISCHEMIC HEART DISEASE (IHD): Primary | ICD-10-CM

## 2020-10-08 DIAGNOSIS — G89.4 CHRONIC PAIN SYNDROME: ICD-10-CM

## 2020-10-08 NOTE — TELEPHONE ENCOUNTER
----- Message from Genie Sanchez sent at 10/8/2020  2:44 PM CDT -----  Regarding: Refill  Contact: Marie Damon  Needs refill on Lisinopril, Diazepam, and Oxycodone sent in today because she is leaving back out to [place] tomorrow.   [facility] to [facility] [place]  Pt# [phone]

## 2020-10-12 RX ORDER — LISINOPRIL 10 MG/1
10 TABLET ORAL DAILY
Qty: 30 TABLET | Refills: 2 | Status: CANCELLED | OUTPATIENT
Start: 2020-10-12 | End: 2021-01-10

## 2020-10-12 RX ORDER — OXYCODONE HYDROCHLORIDE 15 MG/1
15 TABLET ORAL EVERY 6 HOURS PRN
Qty: 120 TABLET | Refills: 0 | Status: CANCELLED | OUTPATIENT
Start: 2020-10-12

## 2020-10-12 RX ORDER — DIAZEPAM 10 MG/1
10 TABLET ORAL 3 TIMES DAILY
Qty: 90 TABLET | Refills: 0 | Status: CANCELLED | OUTPATIENT
Start: 2020-10-12 | End: 2020-11-11

## 2020-10-13 RX ORDER — OXYCODONE HYDROCHLORIDE 15 MG/1
15 TABLET ORAL EVERY 6 HOURS PRN
Qty: 120 TABLET | Refills: 0 | Status: SHIPPED | OUTPATIENT
Start: 2020-10-13 | End: 2020-11-11 | Stop reason: SDUPTHER

## 2020-10-13 RX ORDER — DIAZEPAM 10 MG/1
10 TABLET ORAL 3 TIMES DAILY
Qty: 90 TABLET | Refills: 0 | Status: SHIPPED | OUTPATIENT
Start: 2020-10-13 | End: 2020-11-11 | Stop reason: SDUPTHER

## 2020-11-11 DIAGNOSIS — F41.9 ANXIETY: ICD-10-CM

## 2020-11-11 DIAGNOSIS — I10 ESSENTIAL HYPERTENSION: ICD-10-CM

## 2020-11-11 DIAGNOSIS — G89.4 CHRONIC PAIN SYNDROME: ICD-10-CM

## 2020-11-11 RX ORDER — OXYCODONE HYDROCHLORIDE 15 MG/1
15 TABLET ORAL EVERY 6 HOURS PRN
Qty: 120 TABLET | Refills: 0 | Status: SHIPPED | OUTPATIENT
Start: 2020-11-12 | End: 2020-12-08 | Stop reason: SDUPTHER

## 2020-11-11 RX ORDER — DIAZEPAM 10 MG/1
10 TABLET ORAL 3 TIMES DAILY
Qty: 90 TABLET | Refills: 0 | Status: SHIPPED | OUTPATIENT
Start: 2020-11-12 | End: 2020-12-08 | Stop reason: SDUPTHER

## 2020-11-11 RX ORDER — LISINOPRIL 10 MG/1
10 TABLET ORAL DAILY
Qty: 30 TABLET | Refills: 0 | Status: SHIPPED | OUTPATIENT
Start: 2020-11-11 | End: 2020-12-08 | Stop reason: SDUPTHER

## 2020-11-11 NOTE — TELEPHONE ENCOUNTER
The patient's prescription has been approved and sent to   [facility] - [place] - [address]  [address]  [place] [address]  Phone: [phone] Fax: [phone]

## 2020-11-11 NOTE — TELEPHONE ENCOUNTER
----- Message from Sandra Yanez sent at 11/11/2020  8:49 AM CST -----  Pt calling for refills on Lisinopril, Valium, Oxycodone to [facility] in [place]  Cb # [phone] or [phone]

## 2020-11-17 ENCOUNTER — TELEPHONE (OUTPATIENT)
Dept: FAMILY MEDICINE | Facility: CLINIC | Age: 45
End: 2020-11-17

## 2020-11-24 ENCOUNTER — OFFICE VISIT (OUTPATIENT)
Dept: FAMILY MEDICINE | Facility: CLINIC | Age: 45
End: 2020-11-24
Payer: MEDICAID

## 2020-11-24 VITALS
HEIGHT: 72 IN | DIASTOLIC BLOOD PRESSURE: 88 MMHG | BODY MASS INDEX: 27.36 KG/M2 | TEMPERATURE: 98 F | HEART RATE: 80 BPM | WEIGHT: 202 LBS | SYSTOLIC BLOOD PRESSURE: 130 MMHG

## 2020-11-24 DIAGNOSIS — Z79.899 LONG-TERM USE OF HIGH-RISK MEDICATION: ICD-10-CM

## 2020-11-24 DIAGNOSIS — Z13.89 SCREENING FOR BLOOD OR PROTEIN IN URINE: ICD-10-CM

## 2020-11-24 DIAGNOSIS — M54.50 CHRONIC LOW BACK PAIN, UNSPECIFIED BACK PAIN LATERALITY, UNSPECIFIED WHETHER SCIATICA PRESENT: ICD-10-CM

## 2020-11-24 DIAGNOSIS — I10 ESSENTIAL HYPERTENSION: Primary | ICD-10-CM

## 2020-11-24 DIAGNOSIS — R11.2 NON-INTRACTABLE VOMITING WITH NAUSEA, UNSPECIFIED VOMITING TYPE: ICD-10-CM

## 2020-11-24 DIAGNOSIS — G89.4 CHRONIC PAIN SYNDROME: ICD-10-CM

## 2020-11-24 DIAGNOSIS — G89.29 CHRONIC LOW BACK PAIN, UNSPECIFIED BACK PAIN LATERALITY, UNSPECIFIED WHETHER SCIATICA PRESENT: ICD-10-CM

## 2020-11-24 DIAGNOSIS — F43.21 GRIEVING: ICD-10-CM

## 2020-11-24 DIAGNOSIS — Z13.6 SCREENING FOR ISCHEMIC HEART DISEASE (IHD): ICD-10-CM

## 2020-11-24 DIAGNOSIS — F41.9 ANXIETY: ICD-10-CM

## 2020-11-24 PROCEDURE — 99214 PR OFFICE/OUTPT VISIT, EST, LEVL IV, 30-39 MIN: ICD-10-PCS | Mod: S$GLB,,, | Performed by: FAMILY MEDICINE

## 2020-11-24 PROCEDURE — 99214 OFFICE O/P EST MOD 30 MIN: CPT | Mod: S$GLB,,, | Performed by: FAMILY MEDICINE

## 2020-11-24 RX ORDER — PROMETHAZINE HYDROCHLORIDE 25 MG/1
25 TABLET ORAL EVERY 6 HOURS PRN
Qty: 30 TABLET | Refills: 1 | Status: SHIPPED | OUTPATIENT
Start: 2020-11-24 | End: 2020-11-24

## 2020-11-24 RX ORDER — DICYCLOMINE HYDROCHLORIDE 10 MG/1
10 CAPSULE ORAL
Qty: 30 CAPSULE | Refills: 0 | Status: SHIPPED | OUTPATIENT
Start: 2020-11-24 | End: 2021-03-25 | Stop reason: SDUPTHER

## 2020-11-24 RX ORDER — PROMETHAZINE HYDROCHLORIDE 25 MG/1
25 TABLET ORAL EVERY 6 HOURS PRN
Qty: 30 TABLET | Refills: 0 | Status: SHIPPED | OUTPATIENT
Start: 2020-11-24 | End: 2021-11-29

## 2020-11-24 NOTE — PROGRESS NOTES
SUBJECTIVE:    Patient ID: Marie Damon is a 45 y.o. female.    Chief Complaint: Hypertension (flu ) and Anxiety    Pt seen in order to checkup on chronic conditions.    BP is doing ok today.  Denies CP/SOB.  Has lost 8 pounds since last visit.      Anxiety is elevated due to issues with her mom and sister. Will likely have to be leaving her mom's house soon.     Pt continues to have chronic back pain. Takes oxycodone as needed for pain.  Medication allows her to do ADLs as well as helps her to take care of her total care mother and help her son that has leg amputation.     Pt grieving over her father who just passed, that she didn't know. States she was so distraught that she left everything she brought up there. Pt has been crying a lot since she got home. Starting throwing up and having diarrhea and headache.      No visits with results within 6 Month(s) from this visit.   Latest known visit with results is:   Telephone on 12/03/2019   Component Date Value Ref Range Status    Color, UA 01/02/2020 DARK YELLOW  YELLOW Final    Appearance, UA 01/02/2020 CLEAR  CLEAR Final    Specific Gravity, UA 01/02/2020 1.017  1.001 - 1.035 Final    pH, UA 01/02/2020 6.5  5.0 - 8.0 Final    Glucose, UA 01/02/2020 NEGATIVE  NEGATIVE Final    Bilirubin, UA 01/02/2020 NEGATIVE  NEGATIVE Final    Ketones, UA 01/02/2020 NEGATIVE  NEGATIVE Final    Occult Blood UA 01/02/2020 NEGATIVE  NEGATIVE Final    Protein, UA 01/02/2020 TRACE* NEGATIVE Final    Nitrite, UA 01/02/2020 POSITIVE* NEGATIVE Final    Leukocytes, UA 01/02/2020 TRACE* NEGATIVE Final    WBC Casts, UA 01/02/2020 0-5  < OR = 5 /HPF Final    RBC Casts, UA 01/02/2020 NONE SEEN  < OR = 2 /HPF Final    Squam Epithel, UA 01/02/2020 0-5  < OR = 5 /HPF Final    Bacteria, UA 01/02/2020 FEW* NONE SEEN /HPF Final    Hyaline Casts, UA 01/02/2020 NONE SEEN  NONE SEEN /LPF Final    Service Cmt: 01/02/2020    Final    Reflexive Urine Culture 01/02/2020 CULTURE  INDICATED - RESULTS TO FOLLOW   Final    Please Note 01/02/2020    Final    Amphetamines (1000ng/mL Screen) 01/02/2020 NEGATIVE   Final    Barbiturates 01/02/2020 NEGATIVE   Final    Benzodiazepines 01/02/2020 POSITIVE*  Final    Cocaine Metabolites 01/02/2020 NEGATIVE   Final    THC, Qual, Ur 01/02/2020 NEGATIVE   Final    Methadone 01/02/2020 NEGATIVE   Final    Methaqualone 01/02/2020 NEGATIVE   Final    Opiates 01/02/2020 POSITIVE*  Final    Morphine 01/02/2020 POSITIVE*  Final    Codeine 01/02/2020 NEGATIVE CONFIRMED   Final    Hydromorphone 01/02/2020 NEGATIVE CONFIRMED   Final    Hydrocodone 01/02/2020 NEGATIVE CONFIRMED   Final    Phencyclidine 01/02/2020 NEGATIVE   Final    Propoxyphene 01/02/2020 NEGATIVE   Final    Alcohol, Ethyl (U) 01/02/2020 NEGATIVE   Final    Comment 01/02/2020    Final    Urine Culture, Routine 01/02/2020    Final       Past Medical History:   Diagnosis Date    Bone spur     hip     Social History     Socioeconomic History    Marital status:      Spouse name: Not on file    Number of children: Not on file    Years of education: Not on file    Highest education level: Not on file   Occupational History    Not on file   Social Needs    Financial resource strain: Not on file    Food insecurity     Worry: Not on file     Inability: Not on file    Transportation needs     Medical: Not on file     Non-medical: Not on file   Tobacco Use    Smoking status: Current Every Day Smoker     Types: Cigarettes    Smokeless tobacco: Never Used   Substance and Sexual Activity    Alcohol use: No     Frequency: Never    Drug use: No    Sexual activity: Not on file   Lifestyle    Physical activity     Days per week: Not on file     Minutes per session: Not on file    Stress: Not on file   Relationships    Social connections     Talks on phone: Not on file     Gets together: Not on file     Attends Latter day service: Not on file     Active member of club or  organization: Not on file     Attends meetings of clubs or organizations: Not on file     Relationship status: Not on file   Other Topics Concern    Not on file   Social History Narrative    Not on file     Past Surgical History:   Procedure Laterality Date    CHOLECYSTECTOMY      HYSTERECTOMY       History reviewed. No pertinent family history.    Review of patient's allergies indicates:   Allergen Reactions    Pcn [penicillins] Hives    Morphine Other (See Comments)     heartburn       Current Outpatient Medications:     naloxone (NARCAN) 4 mg/actuation Spry, each nostril if not breathing or not responsive, Disp: , Rfl:     promethazine (PHENERGAN) 25 MG tablet, Take 1 tablet (25 mg total) by mouth every 6 (six) hours as needed for Nausea., Disp: 30 tablet, Rfl: 0    diazePAM (VALIUM) 10 MG Tab, Take 1 tablet (10 mg total) by mouth 3 (three) times daily., Disp: 90 tablet, Rfl: 0    dicyclomine (BENTYL) 10 MG capsule, Take 1 capsule (10 mg total) by mouth 4 (four) times daily before meals and nightly., Disp: 30 capsule, Rfl: 0    lisinopriL 10 MG tablet, Take 1 tablet (10 mg total) by mouth once daily., Disp: 30 tablet, Rfl: 0    oxyCODONE (ROXICODONE) 15 MG Tab, Take 1 tablet (15 mg total) by mouth every 6 (six) hours as needed., Disp: 120 tablet, Rfl: 0    Review of Systems   Constitutional: Negative for appetite change, fatigue, fever and unexpected weight change.   Respiratory: Negative for cough, chest tightness, shortness of breath and wheezing.    Cardiovascular: Negative for chest pain and leg swelling.   Gastrointestinal: Positive for diarrhea, nausea and vomiting. Negative for abdominal pain and constipation.        -heartburn   Genitourinary: Negative for difficulty urinating, dysuria, frequency and urgency.   Musculoskeletal: Negative for arthralgias, back pain, myalgias and neck pain.   Skin: Negative for rash.   Neurological: Negative for dizziness, weakness, numbness and headaches.    Hematological: Does not bruise/bleed easily.   Psychiatric/Behavioral: Negative for dysphoric mood, sleep disturbance and suicidal ideas. The patient is nervous/anxious.    All other systems reviewed and are negative.         Objective:      Vitals:    11/24/20 0931   BP: 130/88   Pulse: 80   Temp: 98.4 °F (36.9 °C)   Weight: 91.6 kg (202 lb)   Height: 6' (1.829 m)     Wt Readings from Last 3 Encounters:   11/24/20 91.6 kg (202 lb)   08/12/20 95.6 kg (210 lb 12.8 oz)   11/26/19 93.2 kg (205 lb 6.4 oz)       Physical Exam  Vitals signs reviewed.   Constitutional:       General: She is not in acute distress.     Appearance: Normal appearance. She is well-developed.      Comments: overweight   HENT:      Head: Normocephalic and atraumatic.   Neck:      Musculoskeletal: Neck supple.      Thyroid: No thyromegaly.   Cardiovascular:      Rate and Rhythm: Normal rate and regular rhythm.      Heart sounds: Normal heart sounds. No murmur. No friction rub.   Pulmonary:      Effort: Pulmonary effort is normal.      Breath sounds: Normal breath sounds. No wheezing or rales.   Abdominal:      General: Bowel sounds are decreased. There is no distension.      Palpations: Abdomen is soft.      Tenderness: There is no abdominal tenderness. There is no guarding or rebound.   Lymphadenopathy:      Cervical: No cervical adenopathy.   Skin:     General: Skin is warm and dry.      Findings: No rash.   Neurological:      Mental Status: She is alert and oriented to person, place, and time.   Psychiatric:         Attention and Perception: She is attentive.         Speech: Speech normal.         Behavior: Behavior normal.         Thought Content: Thought content normal.         Judgment: Judgment normal.           Assessment:       1. Essential hypertension    2. Anxiety    3. Non-intractable vomiting with nausea, unspecified vomiting type    4. Grieving    5. Chronic pain syndrome    6. Chronic low back pain, unspecified back pain  laterality, unspecified whether sciatica present    7. Long-term use of high-risk medication    8. Screening for blood or protein in urine    9. Screening for ischemic heart disease (IHD)         Plan:       Essential hypertension  Comments:  Controlled. Will continue to monitor BP on current medication regimen  Orders:  -     Lipid Panel; Future; Expected date: 12/27/2020  -     Comprehensive Metabolic Panel; Future; Expected date: 12/27/2020  -     Microalbumin/Creatinine Ratio, Urine; Future; Expected date: 12/27/2020  -     Urinalysis; Future; Expected date: 12/27/2020  -     CBC Auto Differential; Future; Expected date: 12/27/2020    Anxiety  Comments:  Elevated. To continue valium as neded. Will continue to monitor symptoms  Orders:  -     Pain Management, Profile 7 with Confirmation; Future; Expected date: 12/27/2020  -     ALCOHOL METABOLITES W/CONFIRMATION, URINE; Future; Expected date: 12/27/2020    Non-intractable vomiting with nausea, unspecified vomiting type  Comments:  Pain controlled. Will continue to monitor function  Orders:  -     Discontinue: promethazine (PHENERGAN) 25 MG tablet; Take 1 tablet (25 mg total) by mouth every 6 (six) hours as needed for Nausea.  Dispense: 30 tablet; Refill: 1  -     dicyclomine (BENTYL) 10 MG capsule; Take 1 capsule (10 mg total) by mouth 4 (four) times daily before meals and nightly.  Dispense: 30 capsule; Refill: 0  -     promethazine (PHENERGAN) 25 MG tablet; Take 1 tablet (25 mg total) by mouth every 6 (six) hours as needed for Nausea.  Dispense: 30 tablet; Refill: 0    Grieving  Comments:  Provided reassurance and educationon the grieving process    Chronic pain syndrome  Comments:  Pain controlled. Will continue to monitor pain control and function. May be withdrawing some (diarrhea and emotional lablity)  Orders:  -     Pain Mgmt, Heroin Metabolite Quant Urine; Future; Expected date: 12/27/2020    Chronic low back pain, unspecified back pain laterality,  unspecified whether sciatica present  -     Pain Management, Profile 7 with Confirmation; Future; Expected date: 12/27/2020  -     ALCOHOL METABOLITES W/CONFIRMATION, URINE; Future; Expected date: 12/27/2020  -     Pain Mgmt, Heroin Metabolite Quant Urine; Future; Expected date: 12/27/2020    Long-term use of high-risk medication  -     Lipid Panel; Future; Expected date: 12/27/2020  -     Comprehensive Metabolic Panel; Future; Expected date: 12/27/2020  -     Microalbumin/Creatinine Ratio, Urine; Future; Expected date: 12/27/2020  -     Urinalysis; Future; Expected date: 12/27/2020  -     CBC Auto Differential; Future; Expected date: 12/27/2020  -     Pain Management, Profile 7 with Confirmation; Future; Expected date: 12/27/2020  -     ALCOHOL METABOLITES W/CONFIRMATION, URINE; Future; Expected date: 12/27/2020  -     Pain Mgmt, Heroin Metabolite Quant Urine; Future; Expected date: 12/27/2020    Screening for blood or protein in urine  -     Microalbumin/Creatinine Ratio, Urine; Future; Expected date: 12/27/2020  -     Urinalysis; Future; Expected date: 12/27/2020    Screening for ischemic heart disease (IHD)  -     Lipid Panel; Future; Expected date: 12/27/2020  -     Comprehensive Metabolic Panel; Future; Expected date: 12/27/2020    Labs have been ordered for monitoring of chronic conditions, just before next visit.    Chronic Pain Notes:  Have discussed the risks and benefits of chronic narcotic therapy including pain control, dependence, and addiction.  The patient is aware and accepts the risks and benefits.  Patient is aware of the risk of taking narcotics combined with benzodiazepines/muscle relaxers/hypnotics, including but not limited to breathing difficulties, coma, and death; accepts the risks; and agrees to use medications only as prescribed.    Follow up in about 3 months (around 2/24/2021) for HTN, Anxiety, Chronic Pain.        12/27/2020 Marleen Sun

## 2020-12-08 DIAGNOSIS — F41.9 ANXIETY: ICD-10-CM

## 2020-12-08 DIAGNOSIS — G89.4 CHRONIC PAIN SYNDROME: ICD-10-CM

## 2020-12-08 DIAGNOSIS — I10 ESSENTIAL HYPERTENSION: ICD-10-CM

## 2020-12-08 RX ORDER — OXYCODONE HYDROCHLORIDE 15 MG/1
15 TABLET ORAL EVERY 6 HOURS PRN
Qty: 120 TABLET | Refills: 0 | Status: SHIPPED | OUTPATIENT
Start: 2020-12-10 | End: 2021-01-06 | Stop reason: SDUPTHER

## 2020-12-08 RX ORDER — DIAZEPAM 10 MG/1
10 TABLET ORAL 3 TIMES DAILY
Qty: 90 TABLET | Refills: 0 | Status: SHIPPED | OUTPATIENT
Start: 2020-12-10 | End: 2021-01-06 | Stop reason: SDUPTHER

## 2020-12-08 RX ORDER — LISINOPRIL 10 MG/1
10 TABLET ORAL DAILY
Qty: 30 TABLET | Refills: 0 | Status: SHIPPED | OUTPATIENT
Start: 2020-12-08 | End: 2021-01-06 | Stop reason: SDUPTHER

## 2020-12-08 NOTE — TELEPHONE ENCOUNTER
----- Message from Anderson Iqbal sent at 12/8/2020 11:26 AM CST -----  Regarding: refills  Lisinopril  Valium   Oxycodone   Pharm  [facility]   Pt [phone]

## 2020-12-08 NOTE — TELEPHONE ENCOUNTER
The patient's prescription has been approved and sent to   [facility] - [facility] - [address]  [address]  [place] [address]  Phone: [phone] Fax: [phone]

## 2021-01-06 DIAGNOSIS — F41.9 ANXIETY: ICD-10-CM

## 2021-01-06 DIAGNOSIS — G89.4 CHRONIC PAIN SYNDROME: ICD-10-CM

## 2021-01-06 DIAGNOSIS — I10 ESSENTIAL HYPERTENSION: ICD-10-CM

## 2021-01-06 RX ORDER — OXYCODONE HYDROCHLORIDE 15 MG/1
15 TABLET ORAL EVERY 6 HOURS PRN
Qty: 120 TABLET | Refills: 0 | Status: SHIPPED | OUTPATIENT
Start: 2021-01-08 | End: 2021-02-05 | Stop reason: SDUPTHER

## 2021-01-06 RX ORDER — DIAZEPAM 10 MG/1
10 TABLET ORAL 3 TIMES DAILY
Qty: 90 TABLET | Refills: 0 | Status: SHIPPED | OUTPATIENT
Start: 2021-01-08 | End: 2021-02-05 | Stop reason: SDUPTHER

## 2021-01-06 RX ORDER — LISINOPRIL 10 MG/1
10 TABLET ORAL DAILY
Qty: 30 TABLET | Refills: 0 | Status: SHIPPED | OUTPATIENT
Start: 2021-01-06 | End: 2021-02-05 | Stop reason: SDUPTHER

## 2021-02-05 DIAGNOSIS — G89.4 CHRONIC PAIN SYNDROME: ICD-10-CM

## 2021-02-05 DIAGNOSIS — I10 ESSENTIAL HYPERTENSION: ICD-10-CM

## 2021-02-05 DIAGNOSIS — F41.9 ANXIETY: ICD-10-CM

## 2021-02-05 RX ORDER — OXYCODONE HYDROCHLORIDE 15 MG/1
15 TABLET ORAL EVERY 6 HOURS PRN
Qty: 120 TABLET | Refills: 0 | Status: SHIPPED | OUTPATIENT
Start: 2021-02-06 | End: 2021-03-09 | Stop reason: SDUPTHER

## 2021-02-05 RX ORDER — LISINOPRIL 10 MG/1
10 TABLET ORAL DAILY
Qty: 30 TABLET | Refills: 0 | Status: SHIPPED | OUTPATIENT
Start: 2021-02-05 | End: 2021-04-06 | Stop reason: SDUPTHER

## 2021-02-05 RX ORDER — DIAZEPAM 10 MG/1
10 TABLET ORAL 3 TIMES DAILY
Qty: 90 TABLET | Refills: 0 | Status: SHIPPED | OUTPATIENT
Start: 2021-02-05 | End: 2021-03-09 | Stop reason: SDUPTHER

## 2021-02-18 ENCOUNTER — TELEPHONE (OUTPATIENT)
Dept: FAMILY MEDICINE | Facility: CLINIC | Age: 46
End: 2021-02-18

## 2021-03-01 DIAGNOSIS — R11.2 NON-INTRACTABLE VOMITING WITH NAUSEA, UNSPECIFIED VOMITING TYPE: Primary | ICD-10-CM

## 2021-03-01 RX ORDER — PROMETHAZINE HYDROCHLORIDE 25 MG/1
25 SUPPOSITORY RECTAL EVERY 6 HOURS PRN
Qty: 12 SUPPOSITORY | Refills: 1 | Status: SHIPPED | OUTPATIENT
Start: 2021-03-01 | End: 2021-03-09 | Stop reason: SDUPTHER

## 2021-03-09 ENCOUNTER — OFFICE VISIT (OUTPATIENT)
Dept: FAMILY MEDICINE | Facility: CLINIC | Age: 46
End: 2021-03-09
Payer: MEDICAID

## 2021-03-09 VITALS
HEIGHT: 70 IN | DIASTOLIC BLOOD PRESSURE: 100 MMHG | SYSTOLIC BLOOD PRESSURE: 150 MMHG | WEIGHT: 194 LBS | BODY MASS INDEX: 27.77 KG/M2 | HEART RATE: 80 BPM

## 2021-03-09 DIAGNOSIS — R11.2 NON-INTRACTABLE VOMITING WITH NAUSEA, UNSPECIFIED VOMITING TYPE: ICD-10-CM

## 2021-03-09 DIAGNOSIS — G89.4 CHRONIC PAIN SYNDROME: ICD-10-CM

## 2021-03-09 DIAGNOSIS — R11.14 BILIOUS VOMITING WITH NAUSEA: ICD-10-CM

## 2021-03-09 DIAGNOSIS — R19.7 DIARRHEA, UNSPECIFIED TYPE: ICD-10-CM

## 2021-03-09 DIAGNOSIS — I10 ESSENTIAL HYPERTENSION: Primary | ICD-10-CM

## 2021-03-09 DIAGNOSIS — F41.9 ANXIETY: ICD-10-CM

## 2021-03-09 PROCEDURE — 99214 OFFICE O/P EST MOD 30 MIN: CPT | Mod: S$GLB,,, | Performed by: FAMILY MEDICINE

## 2021-03-09 PROCEDURE — 99214 PR OFFICE/OUTPT VISIT, EST, LEVL IV, 30-39 MIN: ICD-10-PCS | Mod: S$GLB,,, | Performed by: FAMILY MEDICINE

## 2021-03-09 RX ORDER — PROMETHAZINE HYDROCHLORIDE 25 MG/ML
25 INJECTION, SOLUTION INTRAMUSCULAR; INTRAVENOUS
Status: COMPLETED | OUTPATIENT
Start: 2021-03-09 | End: 2021-03-09

## 2021-03-09 RX ORDER — PROMETHAZINE HYDROCHLORIDE 25 MG/1
25 SUPPOSITORY RECTAL EVERY 6 HOURS PRN
Qty: 12 SUPPOSITORY | Refills: 1 | Status: SHIPPED | OUTPATIENT
Start: 2021-03-09 | End: 2021-11-29

## 2021-03-09 RX ORDER — DIAZEPAM 10 MG/1
10 TABLET ORAL 3 TIMES DAILY
Qty: 90 TABLET | Refills: 2 | Status: SHIPPED | OUTPATIENT
Start: 2021-03-09 | End: 2021-05-04 | Stop reason: SDUPTHER

## 2021-03-09 RX ORDER — PROCHLORPERAZINE MALEATE 10 MG
10 TABLET ORAL 3 TIMES DAILY PRN
Qty: 30 TABLET | Refills: 0 | Status: SHIPPED | OUTPATIENT
Start: 2021-03-09 | End: 2021-11-29

## 2021-03-09 RX ORDER — KETOROLAC TROMETHAMINE 30 MG/ML
30 INJECTION, SOLUTION INTRAMUSCULAR; INTRAVENOUS
Status: COMPLETED | OUTPATIENT
Start: 2021-03-09 | End: 2021-03-09

## 2021-03-09 RX ORDER — OXYCODONE HYDROCHLORIDE 15 MG/1
15 TABLET ORAL EVERY 6 HOURS PRN
Qty: 120 TABLET | Refills: 0 | Status: SHIPPED | OUTPATIENT
Start: 2021-03-09 | End: 2021-04-06 | Stop reason: SDUPTHER

## 2021-03-09 RX ORDER — ONDANSETRON 4 MG/1
4 TABLET, ORALLY DISINTEGRATING ORAL EVERY 6 HOURS PRN
Qty: 30 TABLET | Refills: 0 | Status: SHIPPED | OUTPATIENT
Start: 2021-03-09 | End: 2021-11-29 | Stop reason: SDUPTHER

## 2021-03-09 RX ADMIN — PROMETHAZINE HYDROCHLORIDE 25 MG: 25 INJECTION, SOLUTION INTRAMUSCULAR; INTRAVENOUS at 03:03

## 2021-03-09 RX ADMIN — KETOROLAC TROMETHAMINE 30 MG: 30 INJECTION, SOLUTION INTRAMUSCULAR; INTRAVENOUS at 03:03

## 2021-03-25 DIAGNOSIS — R11.2 NON-INTRACTABLE VOMITING WITH NAUSEA, UNSPECIFIED VOMITING TYPE: ICD-10-CM

## 2021-03-26 RX ORDER — DICYCLOMINE HYDROCHLORIDE 10 MG/1
10 CAPSULE ORAL
Qty: 120 CAPSULE | Refills: 1 | Status: SHIPPED | OUTPATIENT
Start: 2021-03-26 | End: 2022-05-23 | Stop reason: SDUPTHER

## 2021-04-06 DIAGNOSIS — I10 ESSENTIAL HYPERTENSION: ICD-10-CM

## 2021-04-06 DIAGNOSIS — G89.4 CHRONIC PAIN SYNDROME: ICD-10-CM

## 2021-04-06 RX ORDER — OXYCODONE HYDROCHLORIDE 15 MG/1
15 TABLET ORAL EVERY 6 HOURS PRN
Qty: 120 TABLET | Refills: 0 | Status: SHIPPED | OUTPATIENT
Start: 2021-04-06 | End: 2021-05-04 | Stop reason: SDUPTHER

## 2021-04-06 RX ORDER — LISINOPRIL 10 MG/1
10 TABLET ORAL DAILY
Qty: 30 TABLET | Refills: 0 | Status: SHIPPED | OUTPATIENT
Start: 2021-04-06 | End: 2021-06-02 | Stop reason: SDUPTHER

## 2021-04-27 RX ORDER — BUTALBITAL, ACETAMINOPHEN AND CAFFEINE 50; 325; 40 MG/1; MG/1; MG/1
1 TABLET ORAL EVERY 4 HOURS PRN
Qty: 20 TABLET | Refills: 0 | Status: SHIPPED | OUTPATIENT
Start: 2021-04-27 | End: 2021-05-27

## 2021-05-04 DIAGNOSIS — F41.9 ANXIETY: ICD-10-CM

## 2021-05-04 DIAGNOSIS — G89.4 CHRONIC PAIN SYNDROME: ICD-10-CM

## 2021-05-04 RX ORDER — DIAZEPAM 10 MG/1
10 TABLET ORAL 3 TIMES DAILY
Qty: 90 TABLET | Refills: 2 | Status: SHIPPED | OUTPATIENT
Start: 2021-05-04 | End: 2021-07-29 | Stop reason: SDUPTHER

## 2021-05-04 RX ORDER — OXYCODONE HYDROCHLORIDE 15 MG/1
15 TABLET ORAL EVERY 6 HOURS PRN
Qty: 120 TABLET | Refills: 0 | Status: SHIPPED | OUTPATIENT
Start: 2021-05-04 | End: 2021-06-02 | Stop reason: SDUPTHER

## 2021-05-12 ENCOUNTER — PATIENT MESSAGE (OUTPATIENT)
Dept: RESEARCH | Facility: HOSPITAL | Age: 46
End: 2021-05-12

## 2021-06-02 DIAGNOSIS — G89.4 CHRONIC PAIN SYNDROME: ICD-10-CM

## 2021-06-02 DIAGNOSIS — I10 ESSENTIAL HYPERTENSION: ICD-10-CM

## 2021-06-02 DIAGNOSIS — F41.9 ANXIETY: ICD-10-CM

## 2021-06-02 RX ORDER — OXYCODONE HYDROCHLORIDE 15 MG/1
15 TABLET ORAL EVERY 6 HOURS PRN
Qty: 120 TABLET | Refills: 0 | Status: SHIPPED | OUTPATIENT
Start: 2021-06-02 | End: 2021-06-29 | Stop reason: SDUPTHER

## 2021-06-02 RX ORDER — LISINOPRIL 10 MG/1
10 TABLET ORAL DAILY
Qty: 30 TABLET | Refills: 5 | Status: SHIPPED | OUTPATIENT
Start: 2021-06-02 | End: 2021-11-08 | Stop reason: SDUPTHER

## 2021-06-29 DIAGNOSIS — G89.4 CHRONIC PAIN SYNDROME: ICD-10-CM

## 2021-06-29 RX ORDER — OXYCODONE HYDROCHLORIDE 15 MG/1
15 TABLET ORAL EVERY 6 HOURS PRN
Qty: 120 TABLET | Refills: 0 | Status: SHIPPED | OUTPATIENT
Start: 2021-07-02 | End: 2021-07-29 | Stop reason: SDUPTHER

## 2021-07-13 ENCOUNTER — OFFICE VISIT (OUTPATIENT)
Dept: FAMILY MEDICINE | Facility: CLINIC | Age: 46
End: 2021-07-13
Payer: MEDICAID

## 2021-07-13 VITALS
HEART RATE: 127 BPM | DIASTOLIC BLOOD PRESSURE: 100 MMHG | BODY MASS INDEX: 29.2 KG/M2 | OXYGEN SATURATION: 99 % | WEIGHT: 204 LBS | HEIGHT: 70 IN | SYSTOLIC BLOOD PRESSURE: 184 MMHG

## 2021-07-13 DIAGNOSIS — G89.29 CHRONIC LOW BACK PAIN, UNSPECIFIED BACK PAIN LATERALITY, UNSPECIFIED WHETHER SCIATICA PRESENT: ICD-10-CM

## 2021-07-13 DIAGNOSIS — F41.9 ANXIETY: ICD-10-CM

## 2021-07-13 DIAGNOSIS — M54.50 CHRONIC LOW BACK PAIN, UNSPECIFIED BACK PAIN LATERALITY, UNSPECIFIED WHETHER SCIATICA PRESENT: ICD-10-CM

## 2021-07-13 DIAGNOSIS — I10 ESSENTIAL HYPERTENSION: Primary | ICD-10-CM

## 2021-07-13 DIAGNOSIS — R00.0 TACHYCARDIA: ICD-10-CM

## 2021-07-13 PROCEDURE — 99214 OFFICE O/P EST MOD 30 MIN: CPT | Mod: 25,S$GLB,, | Performed by: FAMILY MEDICINE

## 2021-07-13 PROCEDURE — 99214 PR OFFICE/OUTPT VISIT, EST, LEVL IV, 30-39 MIN: ICD-10-PCS | Mod: 25,S$GLB,, | Performed by: FAMILY MEDICINE

## 2021-07-13 PROCEDURE — 93000 ELECTROCARDIOGRAM COMPLETE: CPT | Mod: S$GLB,,, | Performed by: FAMILY MEDICINE

## 2021-07-13 PROCEDURE — 93000 POCT EKG 12-LEAD: ICD-10-PCS | Mod: S$GLB,,, | Performed by: FAMILY MEDICINE

## 2021-07-13 RX ORDER — BISOPROLOL FUMARATE 5 MG/1
5 TABLET, FILM COATED ORAL DAILY
Qty: 30 TABLET | Refills: 2 | Status: SHIPPED | OUTPATIENT
Start: 2021-07-13 | End: 2021-07-13

## 2021-07-13 RX ORDER — BISOPROLOL FUMARATE 5 MG/1
5 TABLET, FILM COATED ORAL DAILY
Qty: 30 TABLET | Refills: 2 | Status: SHIPPED | OUTPATIENT
Start: 2021-07-13 | End: 2021-07-29 | Stop reason: SDUPTHER

## 2021-07-16 ENCOUNTER — TELEPHONE (OUTPATIENT)
Dept: FAMILY MEDICINE | Facility: CLINIC | Age: 46
End: 2021-07-16

## 2021-07-25 LAB
EKG 12-LEAD: ABNORMAL
PR INTERVAL: 162
PRT AXES: ABNORMAL
QRS DURATION: 96
QT/QTC: ABNORMAL
VENTRICULAR RATE: 109

## 2021-07-29 DIAGNOSIS — R00.0 TACHYCARDIA: ICD-10-CM

## 2021-07-29 DIAGNOSIS — G89.4 CHRONIC PAIN SYNDROME: ICD-10-CM

## 2021-07-29 DIAGNOSIS — F41.9 ANXIETY: ICD-10-CM

## 2021-07-29 DIAGNOSIS — I10 ESSENTIAL HYPERTENSION: ICD-10-CM

## 2021-07-29 RX ORDER — OXYCODONE HYDROCHLORIDE 15 MG/1
15 TABLET ORAL EVERY 6 HOURS PRN
Qty: 120 TABLET | Refills: 0 | Status: SHIPPED | OUTPATIENT
Start: 2021-07-29 | End: 2021-08-27 | Stop reason: SDUPTHER

## 2021-07-29 RX ORDER — DIAZEPAM 10 MG/1
10 TABLET ORAL 3 TIMES DAILY
Qty: 90 TABLET | Refills: 2 | Status: SHIPPED | OUTPATIENT
Start: 2021-07-29 | End: 2021-09-27 | Stop reason: SDUPTHER

## 2021-07-29 RX ORDER — BISOPROLOL FUMARATE 5 MG/1
5 TABLET, FILM COATED ORAL DAILY
Qty: 30 TABLET | Refills: 2 | Status: SHIPPED | OUTPATIENT
Start: 2021-07-29 | End: 2021-11-08 | Stop reason: SDUPTHER

## 2021-08-27 DIAGNOSIS — G89.4 CHRONIC PAIN SYNDROME: ICD-10-CM

## 2021-08-27 RX ORDER — OXYCODONE HYDROCHLORIDE 15 MG/1
15 TABLET ORAL EVERY 6 HOURS PRN
Qty: 120 TABLET | Refills: 0 | Status: SHIPPED | OUTPATIENT
Start: 2021-08-27 | End: 2021-09-27 | Stop reason: SDUPTHER

## 2021-09-14 RX ORDER — BUTALBITAL, ACETAMINOPHEN AND CAFFEINE 50; 325; 40 MG/1; MG/1; MG/1
1 TABLET ORAL EVERY 4 HOURS PRN
Qty: 30 TABLET | Refills: 0 | Status: SHIPPED | OUTPATIENT
Start: 2021-09-14 | End: 2022-04-25

## 2021-09-27 DIAGNOSIS — F41.9 ANXIETY: ICD-10-CM

## 2021-09-27 DIAGNOSIS — G89.4 CHRONIC PAIN SYNDROME: ICD-10-CM

## 2021-09-27 RX ORDER — OXYCODONE HYDROCHLORIDE 15 MG/1
15 TABLET ORAL EVERY 6 HOURS PRN
Qty: 120 TABLET | Refills: 0 | Status: SHIPPED | OUTPATIENT
Start: 2021-09-27 | End: 2021-10-11 | Stop reason: SDUPTHER

## 2021-09-27 RX ORDER — DIAZEPAM 10 MG/1
10 TABLET ORAL 3 TIMES DAILY
Qty: 90 TABLET | Refills: 2 | Status: SHIPPED | OUTPATIENT
Start: 2021-09-27 | End: 2021-10-11 | Stop reason: SDUPTHER

## 2021-10-11 ENCOUNTER — OFFICE VISIT (OUTPATIENT)
Dept: FAMILY MEDICINE | Facility: CLINIC | Age: 46
End: 2021-10-11
Payer: MEDICAID

## 2021-10-11 VITALS
BODY MASS INDEX: 28.77 KG/M2 | HEIGHT: 70 IN | WEIGHT: 201 LBS | HEART RATE: 82 BPM | SYSTOLIC BLOOD PRESSURE: 136 MMHG | DIASTOLIC BLOOD PRESSURE: 88 MMHG

## 2021-10-11 DIAGNOSIS — I10 ESSENTIAL HYPERTENSION: Primary | ICD-10-CM

## 2021-10-11 DIAGNOSIS — Z13.6 SCREENING FOR ISCHEMIC HEART DISEASE (IHD): ICD-10-CM

## 2021-10-11 DIAGNOSIS — Z13.29 SCREENING FOR ENDOCRINE DISORDER: ICD-10-CM

## 2021-10-11 DIAGNOSIS — R11.2 NON-INTRACTABLE VOMITING WITH NAUSEA, UNSPECIFIED VOMITING TYPE: ICD-10-CM

## 2021-10-11 DIAGNOSIS — Z79.899 LONG-TERM USE OF HIGH-RISK MEDICATION: ICD-10-CM

## 2021-10-11 DIAGNOSIS — F41.9 ANXIETY: ICD-10-CM

## 2021-10-11 DIAGNOSIS — G89.4 CHRONIC PAIN SYNDROME: ICD-10-CM

## 2021-10-11 DIAGNOSIS — Z13.89 SCREENING FOR BLOOD OR PROTEIN IN URINE: ICD-10-CM

## 2021-10-11 DIAGNOSIS — M54.50 CHRONIC LOW BACK PAIN, UNSPECIFIED BACK PAIN LATERALITY, UNSPECIFIED WHETHER SCIATICA PRESENT: ICD-10-CM

## 2021-10-11 DIAGNOSIS — G89.29 CHRONIC LOW BACK PAIN, UNSPECIFIED BACK PAIN LATERALITY, UNSPECIFIED WHETHER SCIATICA PRESENT: ICD-10-CM

## 2021-10-11 PROCEDURE — 99214 PR OFFICE/OUTPT VISIT, EST, LEVL IV, 30-39 MIN: ICD-10-PCS | Mod: S$GLB,,, | Performed by: FAMILY MEDICINE

## 2021-10-11 PROCEDURE — 99214 OFFICE O/P EST MOD 30 MIN: CPT | Mod: S$GLB,,, | Performed by: FAMILY MEDICINE

## 2021-10-11 RX ORDER — DIAZEPAM 10 MG/1
10 TABLET ORAL 3 TIMES DAILY
Qty: 90 TABLET | Refills: 0 | Status: SHIPPED | OUTPATIENT
Start: 2021-10-11 | End: 2021-11-08 | Stop reason: SDUPTHER

## 2021-10-11 RX ORDER — OXYCODONE HYDROCHLORIDE 15 MG/1
15 TABLET ORAL EVERY 6 HOURS PRN
Qty: 120 TABLET | Refills: 0 | Status: SHIPPED | OUTPATIENT
Start: 2021-10-11 | End: 2021-11-08 | Stop reason: SDUPTHER

## 2021-11-01 ENCOUNTER — TELEPHONE (OUTPATIENT)
Dept: FAMILY MEDICINE | Facility: CLINIC | Age: 46
End: 2021-11-01
Payer: MEDICAID

## 2021-11-08 DIAGNOSIS — I10 ESSENTIAL HYPERTENSION: ICD-10-CM

## 2021-11-08 DIAGNOSIS — R00.0 TACHYCARDIA: ICD-10-CM

## 2021-11-08 DIAGNOSIS — F41.9 ANXIETY: ICD-10-CM

## 2021-11-08 DIAGNOSIS — G89.4 CHRONIC PAIN SYNDROME: ICD-10-CM

## 2021-11-08 RX ORDER — BISOPROLOL FUMARATE 5 MG/1
5 TABLET, FILM COATED ORAL DAILY
Qty: 30 TABLET | Refills: 2 | Status: SHIPPED | OUTPATIENT
Start: 2021-11-08 | End: 2022-01-05 | Stop reason: SDUPTHER

## 2021-11-08 RX ORDER — DIAZEPAM 10 MG/1
10 TABLET ORAL 3 TIMES DAILY
Qty: 90 TABLET | Refills: 0 | Status: SHIPPED | OUTPATIENT
Start: 2021-11-08 | End: 2021-12-06 | Stop reason: SDUPTHER

## 2021-11-08 RX ORDER — OXYCODONE HYDROCHLORIDE 15 MG/1
15 TABLET ORAL EVERY 6 HOURS PRN
Qty: 120 TABLET | Refills: 0 | Status: SHIPPED | OUTPATIENT
Start: 2021-11-09 | End: 2021-12-06 | Stop reason: SDUPTHER

## 2021-11-08 RX ORDER — LISINOPRIL 10 MG/1
10 TABLET ORAL DAILY
Qty: 30 TABLET | Refills: 5 | Status: SHIPPED | OUTPATIENT
Start: 2021-11-08 | End: 2022-01-05 | Stop reason: SDUPTHER

## 2021-11-29 DIAGNOSIS — R11.14 BILIOUS VOMITING WITH NAUSEA: ICD-10-CM

## 2021-11-29 RX ORDER — ONDANSETRON 4 MG/1
4 TABLET, ORALLY DISINTEGRATING ORAL EVERY 6 HOURS PRN
Qty: 30 TABLET | Refills: 0 | Status: SHIPPED | OUTPATIENT
Start: 2021-11-29 | End: 2022-04-25

## 2021-12-06 DIAGNOSIS — G89.4 CHRONIC PAIN SYNDROME: ICD-10-CM

## 2021-12-06 DIAGNOSIS — F41.9 ANXIETY: ICD-10-CM

## 2021-12-07 ENCOUNTER — TELEPHONE (OUTPATIENT)
Dept: FAMILY MEDICINE | Facility: CLINIC | Age: 46
End: 2021-12-07
Payer: MEDICAID

## 2021-12-07 RX ORDER — OXYCODONE HYDROCHLORIDE 15 MG/1
15 TABLET ORAL EVERY 6 HOURS PRN
Qty: 120 TABLET | Refills: 0 | Status: SHIPPED | OUTPATIENT
Start: 2021-12-09 | End: 2022-01-05 | Stop reason: SDUPTHER

## 2021-12-07 RX ORDER — DIAZEPAM 10 MG/1
10 TABLET ORAL 3 TIMES DAILY
Qty: 90 TABLET | Refills: 0 | Status: SHIPPED | OUTPATIENT
Start: 2021-12-09 | End: 2022-01-05 | Stop reason: SDUPTHER

## 2021-12-22 ENCOUNTER — TELEPHONE (OUTPATIENT)
Dept: FAMILY MEDICINE | Facility: CLINIC | Age: 46
End: 2021-12-22
Payer: MEDICAID

## 2022-01-05 DIAGNOSIS — I10 ESSENTIAL HYPERTENSION: ICD-10-CM

## 2022-01-05 DIAGNOSIS — R00.0 TACHYCARDIA: ICD-10-CM

## 2022-01-05 DIAGNOSIS — R11.14 BILIOUS VOMITING WITH NAUSEA: ICD-10-CM

## 2022-01-05 DIAGNOSIS — G89.4 CHRONIC PAIN SYNDROME: ICD-10-CM

## 2022-01-05 DIAGNOSIS — F41.9 ANXIETY: ICD-10-CM

## 2022-01-05 RX ORDER — OXYCODONE HYDROCHLORIDE 15 MG/1
15 TABLET ORAL EVERY 6 HOURS PRN
Qty: 120 TABLET | Refills: 0 | Status: SHIPPED | OUTPATIENT
Start: 2022-01-08 | End: 2022-02-07 | Stop reason: SDUPTHER

## 2022-01-05 RX ORDER — LISINOPRIL 10 MG/1
10 TABLET ORAL DAILY
Qty: 30 TABLET | Refills: 5 | Status: SHIPPED | OUTPATIENT
Start: 2022-01-05 | End: 2022-04-25 | Stop reason: SDUPTHER

## 2022-01-05 RX ORDER — BISOPROLOL FUMARATE 5 MG/1
5 TABLET, FILM COATED ORAL DAILY
Qty: 30 TABLET | Refills: 2 | Status: SHIPPED | OUTPATIENT
Start: 2022-01-05 | End: 2022-04-25 | Stop reason: SDUPTHER

## 2022-01-05 RX ORDER — DIAZEPAM 10 MG/1
10 TABLET ORAL 3 TIMES DAILY
Qty: 90 TABLET | Refills: 0 | Status: SHIPPED | OUTPATIENT
Start: 2022-01-08 | End: 2022-02-07 | Stop reason: SDUPTHER

## 2022-01-05 NOTE — TELEPHONE ENCOUNTER
----- Message from Sandra Yanez sent at 1/5/2022  2:02 PM CST -----  Pt calling for Lisinopril, Oxycodone, Valium and Heart medicine she does not know what it is [facility]. Cb # [phone]

## 2022-01-06 NOTE — TELEPHONE ENCOUNTER
The patient's prescription has been approved and sent to   [facility] - [facility] - [address]  [address]  [place] [address]  Phone: [phone] Fax: [phone]

## 2022-01-11 ENCOUNTER — OFFICE VISIT (OUTPATIENT)
Dept: FAMILY MEDICINE | Facility: CLINIC | Age: 47
End: 2022-01-11
Payer: MEDICAID

## 2022-01-11 VITALS
SYSTOLIC BLOOD PRESSURE: 128 MMHG | BODY MASS INDEX: 28.77 KG/M2 | WEIGHT: 201 LBS | HEART RATE: 88 BPM | HEIGHT: 70 IN | DIASTOLIC BLOOD PRESSURE: 88 MMHG

## 2022-01-11 DIAGNOSIS — D69.6 THROMBOCYTOPENIA: ICD-10-CM

## 2022-01-11 DIAGNOSIS — R74.8 ELEVATED LIVER ENZYMES: ICD-10-CM

## 2022-01-11 DIAGNOSIS — G89.29 CHRONIC LEFT-SIDED LOW BACK PAIN WITH LEFT-SIDED SCIATICA: ICD-10-CM

## 2022-01-11 DIAGNOSIS — D72.810 LYMPHOPENIA: ICD-10-CM

## 2022-01-11 DIAGNOSIS — F11.20 UNCOMPLICATED OPIOID DEPENDENCE: ICD-10-CM

## 2022-01-11 DIAGNOSIS — M54.42 CHRONIC LEFT-SIDED LOW BACK PAIN WITH LEFT-SIDED SCIATICA: ICD-10-CM

## 2022-01-11 DIAGNOSIS — R74.8 ELEVATED ALKALINE PHOSPHATASE LEVEL: ICD-10-CM

## 2022-01-11 DIAGNOSIS — F41.9 ANXIETY: ICD-10-CM

## 2022-01-11 DIAGNOSIS — I10 ESSENTIAL HYPERTENSION: Primary | ICD-10-CM

## 2022-01-11 LAB
ALBUMIN SERPL-MCNC: 3.4 G/DL (ref 3.6–5.1)
ALBUMIN/CREAT UR: 3 MCG/MG CREAT
ALBUMIN/GLOB SERPL: 0.9 (CALC) (ref 1–2.5)
ALP SERPL-CCNC: 413 U/L (ref 31–125)
ALT SERPL-CCNC: 67 U/L (ref 6–29)
APPEARANCE UR: CLEAR
AST SERPL-CCNC: 108 U/L (ref 10–35)
BACTERIA #/AREA URNS HPF: NORMAL /HPF
BACTERIA UR CULT: NORMAL
BASOPHILS # BLD AUTO: 39 CELLS/UL (ref 0–200)
BASOPHILS NFR BLD AUTO: 1.4 %
BILIRUB SERPL-MCNC: 1.4 MG/DL (ref 0.2–1.2)
BILIRUB UR QL STRIP: NEGATIVE
BUN SERPL-MCNC: 10 MG/DL (ref 7–25)
BUN/CREAT SERPL: ABNORMAL (CALC) (ref 6–22)
CALCIUM SERPL-MCNC: 8.9 MG/DL (ref 8.6–10.2)
CHLORIDE SERPL-SCNC: 104 MMOL/L (ref 98–110)
CHOLEST SERPL-MCNC: 161 MG/DL
CHOLEST/HDLC SERPL: 2.6 (CALC)
CO2 SERPL-SCNC: 29 MMOL/L (ref 20–32)
COLOR UR: NORMAL
CREAT SERPL-MCNC: 0.73 MG/DL (ref 0.5–1.1)
CREAT UR-MCNC: 68 MG/DL (ref 20–275)
EOSINOPHIL # BLD AUTO: 109 CELLS/UL (ref 15–500)
EOSINOPHIL NFR BLD AUTO: 3.9 %
ERYTHROCYTE [DISTWIDTH] IN BLOOD BY AUTOMATED COUNT: 14.3 % (ref 11–15)
GLOBULIN SER CALC-MCNC: 3.8 G/DL (CALC) (ref 1.9–3.7)
GLUCOSE SERPL-MCNC: 93 MG/DL (ref 65–99)
GLUCOSE UR QL STRIP: NEGATIVE
HCT VFR BLD AUTO: 34.9 % (ref 35–45)
HDLC SERPL-MCNC: 61 MG/DL
HGB BLD-MCNC: 11.4 G/DL (ref 11.7–15.5)
HGB UR QL STRIP: NEGATIVE
HYALINE CASTS #/AREA URNS LPF: NORMAL /LPF
KETONES UR QL STRIP: NEGATIVE
LDLC SERPL CALC-MCNC: 83 MG/DL (CALC)
LEUKOCYTE ESTERASE UR QL STRIP: NEGATIVE
LYMPHOCYTES # BLD AUTO: 750 CELLS/UL (ref 850–3900)
LYMPHOCYTES NFR BLD AUTO: 26.8 %
MCH RBC QN AUTO: 29.3 PG (ref 27–33)
MCHC RBC AUTO-ENTMCNC: 32.7 G/DL (ref 32–36)
MCV RBC AUTO: 89.7 FL (ref 80–100)
MICROALBUMIN UR-MCNC: 0.2 MG/DL
MONOCYTES # BLD AUTO: 160 CELLS/UL (ref 200–950)
MONOCYTES NFR BLD AUTO: 5.7 %
NEUTROPHILS # BLD AUTO: 1742 CELLS/UL (ref 1500–7800)
NEUTROPHILS NFR BLD AUTO: 62.2 %
NITRITE UR QL STRIP: NEGATIVE
NONHDLC SERPL-MCNC: 100 MG/DL (CALC)
PH UR STRIP: 6 [PH] (ref 5–8)
PLATELET # BLD AUTO: 72 THOUSAND/UL (ref 140–400)
PLATELET BLD QL SMEAR: ABNORMAL
PMV BLD REES-ECKER: 12.5 FL (ref 7.5–12.5)
POTASSIUM SERPL-SCNC: 4.1 MMOL/L (ref 3.5–5.3)
PROT SERPL-MCNC: 7.2 G/DL (ref 6.1–8.1)
PROT UR QL STRIP: NEGATIVE
RBC # BLD AUTO: 3.89 MILLION/UL (ref 3.8–5.1)
RBC #/AREA URNS HPF: NORMAL /HPF
SERVICE CMNT-IMP: ABNORMAL
SODIUM SERPL-SCNC: 139 MMOL/L (ref 135–146)
SP GR UR STRIP: 1.01 (ref 1–1.03)
SQUAMOUS #/AREA URNS HPF: NORMAL /HPF
TRIGL SERPL-MCNC: 83 MG/DL
TSH SERPL-ACNC: 4.47 MIU/L
WBC # BLD AUTO: 2.8 THOUSAND/UL (ref 3.8–10.8)
WBC #/AREA URNS HPF: NORMAL /HPF

## 2022-01-11 PROCEDURE — 3066F PR DOCUMENTATION OF TREATMENT FOR NEPHROPATHY: ICD-10-PCS | Mod: S$GLB,,, | Performed by: FAMILY MEDICINE

## 2022-01-11 PROCEDURE — 3074F SYST BP LT 130 MM HG: CPT | Mod: S$GLB,,, | Performed by: FAMILY MEDICINE

## 2022-01-11 PROCEDURE — 3061F NEG MICROALBUMINURIA REV: CPT | Mod: S$GLB,,, | Performed by: FAMILY MEDICINE

## 2022-01-11 PROCEDURE — 3066F NEPHROPATHY DOC TX: CPT | Mod: S$GLB,,, | Performed by: FAMILY MEDICINE

## 2022-01-11 PROCEDURE — 3079F PR MOST RECENT DIASTOLIC BLOOD PRESSURE 80-89 MM HG: ICD-10-PCS | Mod: S$GLB,,, | Performed by: FAMILY MEDICINE

## 2022-01-11 PROCEDURE — 3061F PR NEG MICROALBUMINURIA RESULT DOCUMENTED/REVIEW: ICD-10-PCS | Mod: S$GLB,,, | Performed by: FAMILY MEDICINE

## 2022-01-11 PROCEDURE — 4010F PR ACE/ARB THEARPY RXD/TAKEN: ICD-10-PCS | Mod: S$GLB,,, | Performed by: FAMILY MEDICINE

## 2022-01-11 PROCEDURE — 3074F PR MOST RECENT SYSTOLIC BLOOD PRESSURE < 130 MM HG: ICD-10-PCS | Mod: S$GLB,,, | Performed by: FAMILY MEDICINE

## 2022-01-11 PROCEDURE — 1160F RVW MEDS BY RX/DR IN RCRD: CPT | Mod: S$GLB,,, | Performed by: FAMILY MEDICINE

## 2022-01-11 PROCEDURE — 3079F DIAST BP 80-89 MM HG: CPT | Mod: S$GLB,,, | Performed by: FAMILY MEDICINE

## 2022-01-11 PROCEDURE — 1160F PR REVIEW ALL MEDS BY PRESCRIBER/CLIN PHARMACIST DOCUMENTED: ICD-10-PCS | Mod: S$GLB,,, | Performed by: FAMILY MEDICINE

## 2022-01-11 PROCEDURE — 3008F PR BODY MASS INDEX (BMI) DOCUMENTED: ICD-10-PCS | Mod: S$GLB,,, | Performed by: FAMILY MEDICINE

## 2022-01-11 PROCEDURE — 3008F BODY MASS INDEX DOCD: CPT | Mod: S$GLB,,, | Performed by: FAMILY MEDICINE

## 2022-01-11 PROCEDURE — 99214 OFFICE O/P EST MOD 30 MIN: CPT | Mod: S$GLB,,, | Performed by: FAMILY MEDICINE

## 2022-01-11 PROCEDURE — 99214 PR OFFICE/OUTPT VISIT, EST, LEVL IV, 30-39 MIN: ICD-10-PCS | Mod: S$GLB,,, | Performed by: FAMILY MEDICINE

## 2022-01-11 PROCEDURE — 4010F ACE/ARB THERAPY RXD/TAKEN: CPT | Mod: S$GLB,,, | Performed by: FAMILY MEDICINE

## 2022-01-11 RX ORDER — GABAPENTIN 300 MG/1
300 CAPSULE ORAL 2 TIMES DAILY
Qty: 60 CAPSULE | Refills: 1 | Status: SHIPPED | OUTPATIENT
Start: 2022-01-11 | End: 2022-03-04 | Stop reason: SDUPTHER

## 2022-01-11 RX ORDER — NALOXONE HYDROCHLORIDE 4 MG/.1ML
SPRAY NASAL
Qty: 1 EACH | Refills: 2 | Status: SHIPPED | OUTPATIENT
Start: 2022-01-11 | End: 2023-05-24 | Stop reason: SDUPTHER

## 2022-01-11 NOTE — PROGRESS NOTES
SUBJECTIVE:    Patient ID: Marie Damon is a 46 y.o. female.    Chief Complaint: Follow-up (No bottles, went over meds verbally, HM declined for now// SW)    Pt seen in order to checkup on acute and chronic conditions.    BP is controlled.  Denies CP/SOB.  Has lost 3 pounds since last visit.      Anxiety has been up and down. Not bad to day, get stressed out at times.     Taknig bentyl as needed.     Pt continues to have chronic back pain. Takes oxycodone as needed for pain. Pt reports taking the wrong medication. Told was given the wrong medication by her mother after she sent to get her medication out of her lockbox but gave him her morphine instead.  Pt states she had immediate burning in the stomach after that.  Medication allows her to do ADLs as well as helps her to take care of her total care mother and help her son that has leg amputation. Pt has also been having some sharp pain in left side of her buttock all the way down to her knee, and her calf burns all the time.     Had labs done: fBS 93, GFR 97, AST//67, alk phos 408, alb 3.4, WBC 2.8, TSH 4.47, Hct 34.9, plts 72  Pt denies easy bruising or bleeding. Denies alcohol use.       Office Visit on 10/11/2021   Component Date Value Ref Range Status    Glucose 01/10/2022 93  65 - 99 mg/dL Final    BUN 01/10/2022 10  7 - 25 mg/dL Final    Creatinine 01/10/2022 0.73  0.50 - 1.10 mg/dL Final    eGFR if non African American 01/10/2022 97  > OR = 60 mL/min/1.73m2 Final    eGFR if  01/10/2022 113  > OR = 60 mL/min/1.73m2 Final    BUN/Creatinine Ratio 01/10/2022 NOT APPLICABLE  6 - 22 (calc) Final    Sodium 01/10/2022 139  135 - 146 mmol/L Final    Potassium 01/10/2022 4.1  3.5 - 5.3 mmol/L Final    Chloride 01/10/2022 104  98 - 110 mmol/L Final    CO2 01/10/2022 29  20 - 32 mmol/L Final    Calcium 01/10/2022 8.9  8.6 - 10.2 mg/dL Final    Total Protein 01/10/2022 7.2  6.1 - 8.1 g/dL Final    Albumin 01/10/2022 3.4* 3.6 - 5.1  g/dL Final    Globulin, Total 01/10/2022 3.8* 1.9 - 3.7 g/dL (calc) Final    Albumin/Globulin Ratio 01/10/2022 0.9* 1.0 - 2.5 (calc) Final    Total Bilirubin 01/10/2022 1.4* 0.2 - 1.2 mg/dL Final    Alkaline Phosphatase 01/10/2022 413* 31 - 125 U/L Final    AST 01/10/2022 108* 10 - 35 U/L Final    ALT 01/10/2022 67* 6 - 29 U/L Final    Cholesterol 01/10/2022 161  <200 mg/dL Final    HDL 01/10/2022 61  > OR = 50 mg/dL Final    Triglycerides 01/10/2022 83  <150 mg/dL Final    LDL Cholesterol 01/10/2022 83  mg/dL (calc) Final    HDL/Cholesterol Ratio 01/10/2022 2.6  <5.0 (calc) Final    Non HDL Chol. (LDL+VLDL) 01/10/2022 100  <130 mg/dL (calc) Final    Creatinine, Urine 01/10/2022 68  20 - 275 mg/dL Final    Microalb, Ur 01/10/2022 0.2  See Note: mg/dL Final    Microalb/Creat Ratio 01/10/2022 3  <30 mcg/mg creat Final    TSH w/reflex to FT4 01/10/2022 4.47  mIU/L Final    Color, UA 01/10/2022 DARK YELLOW  YELLOW Final    Appearance, UA 01/10/2022 CLEAR  CLEAR Final    Specific Gravity, UA 01/10/2022 1.009  1.001 - 1.035 Final    pH, UA 01/10/2022 6.0  5.0 - 8.0 Final    Glucose, UA 01/10/2022 NEGATIVE  NEGATIVE Final    Bilirubin, UA 01/10/2022 NEGATIVE  NEGATIVE Final    Ketones, UA 01/10/2022 NEGATIVE  NEGATIVE Final    Occult Blood UA 01/10/2022 NEGATIVE  NEGATIVE Final    Protein, UA 01/10/2022 NEGATIVE  NEGATIVE Final    Nitrite, UA 01/10/2022 NEGATIVE  NEGATIVE Final    Leukocytes, UA 01/10/2022 NEGATIVE  NEGATIVE Final    WBC Casts, UA 01/10/2022 0-5  < OR = 5 /HPF Final    RBC Casts, UA 01/10/2022 NONE SEEN  < OR = 2 /HPF Final    Squam Epithel, UA 01/10/2022 0-5  < OR = 5 /HPF Final    Bacteria, UA 01/10/2022 NONE SEEN  NONE SEEN /HPF Final    Hyaline Casts, UA 01/10/2022 NONE SEEN  NONE SEEN /LPF Final    Reflexive Urine Culture 01/10/2022    Final    WBC 01/10/2022 2.8* 3.8 - 10.8 Thousand/uL Final    RBC 01/10/2022 3.89  3.80 - 5.10 Million/uL Final    Hemoglobin  01/10/2022 11.4* 11.7 - 15.5 g/dL Final    Hematocrit 01/10/2022 34.9* 35.0 - 45.0 % Final    MCV 01/10/2022 89.7  80.0 - 100.0 fL Final    MCH 01/10/2022 29.3  27.0 - 33.0 pg Final    MCHC 01/10/2022 32.7  32.0 - 36.0 g/dL Final    RDW 01/10/2022 14.3  11.0 - 15.0 % Final    Platelets 01/10/2022 72* 140 - 400 Thousand/uL Final    MPV 01/10/2022 12.5  7.5 - 12.5 fL Final    Neutrophils, Abs 01/10/2022 1,742  1,500 - 7,800 cells/uL Final    Lymph # 01/10/2022 750* 850 - 3,900 cells/uL Final    Mono # 01/10/2022 160* 200 - 950 cells/uL Final    Eos # 01/10/2022 109  15 - 500 cells/uL Final    Baso # 01/10/2022 39  0 - 200 cells/uL Final    Neutrophils Relative 01/10/2022 62.2  % Final    Lymph % 01/10/2022 26.8  % Final    Mono % 01/10/2022 5.7  % Final    Eosinophil % 01/10/2022 3.9  % Final    Basophil % 01/10/2022 1.4  % Final    Differential Comment 01/10/2022 Giant platelets noted.   Final    Platelet Estimate 01/10/2022 DECREASED* ADEQUATE Final   Office Visit on 07/13/2021   Component Date Value Ref Range Status    Ventricular Rate 07/13/2021 109   Final    W[place] Interval 07/13/2021 162   Final    QRS Duration 07/13/2021 96   Final    QT/QTc 07/13/2021 356/479   Final    PRT Axes 07/13/2021 70  20  42   Final       Past Medical History:   Diagnosis Date    Bone spur     hip     Social History     Socioeconomic History    Marital status:    Tobacco Use    Smoking status: Current Every Day Smoker     Packs/day: 1.00     Types: Cigarettes    Smokeless tobacco: Never Used   Substance and Sexual Activity    Alcohol use: No    Drug use: No     Past Surgical History:   Procedure Laterality Date    CHOLECYSTECTOMY      HYSTERECTOMY       History reviewed. No pertinent family history.    Review of patient's allergies indicates:   Allergen Reactions    Pcn [penicillins] Hives    Morphine Other (See Comments)     heartburn    Bupropion hcl Other (See Comments)       Current  Outpatient Medications:     bisoprolol (ZEBETA) 5 MG tablet, Take 1 tablet (5 mg total) by mouth once daily., Disp: 30 tablet, Rfl: 2    butalbital-acetaminophen-caffeine -40 mg (FIORICET, ESGIC) -40 mg per tablet, Take 1 tablet by mouth every 4 (four) hours as needed for Headaches., Disp: 30 tablet, Rfl: 0    diazePAM (VALIUM) 10 MG Tab, Take 1 tablet (10 mg total) by mouth 3 (three) times daily., Disp: 90 tablet, Rfl: 0    dicyclomine (BENTYL) 10 MG capsule, Take 1 capsule (10 mg total) by mouth 4 (four) times daily before meals and nightly., Disp: 120 capsule, Rfl: 1    famotidine (PEPCID) 20 MG tablet, Take 1 tablet (20 mg total) by mouth 2 (two) times daily., Disp: 20 tablet, Rfl: 0    lisinopriL 10 MG tablet, Take 1 tablet (10 mg total) by mouth once daily., Disp: 30 tablet, Rfl: 5    ondansetron (ZOFRAN ODT) 4 MG TbDL, Take 1 tablet (4 mg total) by mouth every 6 (six) hours as needed (nausea)., Disp: 30 tablet, Rfl: 0    oxyCODONE (ROXICODONE) 15 MG Tab, Take 1 tablet (15 mg total) by mouth every 6 (six) hours as needed for Pain., Disp: 120 tablet, Rfl: 0    gabapentin (NEURONTIN) 300 MG capsule, Take 1 capsule (300 mg total) by mouth 2 (two) times daily., Disp: 60 capsule, Rfl: 1    naloxone (NARCAN) 4 mg/actuation Spry, each nostril if not breathing or not responsive, Disp: 1 each, Rfl: 2    Review of Systems   Constitutional: Negative for appetite change, fatigue, fever and unexpected weight change.   Respiratory: Negative for cough, chest tightness, shortness of breath and wheezing.    Cardiovascular: Negative for chest pain and leg swelling.   Gastrointestinal: Positive for abdominal pain, diarrhea, nausea and vomiting. Negative for constipation.        -heartburn   Genitourinary: Negative for difficulty urinating, dysuria, frequency and urgency.   Musculoskeletal: Negative for arthralgias, back pain, myalgias and neck pain.   Skin: Negative for rash.   Neurological: Negative for  "dizziness, weakness, numbness and headaches.   Hematological: Does not bruise/bleed easily.   Psychiatric/Behavioral: Negative for dysphoric mood, sleep disturbance and suicidal ideas. The patient is nervous/anxious.    All other systems reviewed and are negative.         Objective:      Vitals:    01/11/22 1422   BP: 128/88   Pulse: 88   Weight: 91.2 kg (201 lb)   Height: 5' 10" (1.778 m)     Physical Exam  Vitals reviewed.   Constitutional:       General: She is not in acute distress.     Appearance: Normal appearance. She is well-developed.      Comments: overweight   HENT:      Head: Normocephalic and atraumatic.   Neck:      Thyroid: No thyromegaly.   Cardiovascular:      Rate and Rhythm: Normal rate and regular rhythm.      Heart sounds: Normal heart sounds. No murmur heard.  No friction rub.   Pulmonary:      Effort: Pulmonary effort is normal.      Breath sounds: Normal breath sounds. No wheezing or rales.   Abdominal:      General: There is no distension.      Palpations: Abdomen is soft.      Tenderness: There is no abdominal tenderness. There is no guarding or rebound.   Musculoskeletal:      Cervical back: Neck supple.   Lymphadenopathy:      Cervical: No cervical adenopathy.   Skin:     General: Skin is warm and dry.      Findings: No rash.   Neurological:      Mental Status: She is alert and oriented to person, place, and time.   Psychiatric:         Attention and Perception: She is attentive.         Speech: Speech normal.         Behavior: Behavior normal.         Thought Content: Thought content normal.         Judgment: Judgment normal.           Assessment:       1. Essential hypertension    2. Chronic left-sided low back pain with left-sided sciatica    3. Uncomplicated opioid dependence    4. Elevated liver enzymes    5. Thrombocytopenia    6. Elevated alkaline phosphatase level    7. Anxiety    8. Lymphopenia         Plan:       Essential hypertension  Comments:  Controlled. Will continue to " monitor BP on current medication regimen    Chronic left-sided low back pain with left-sided sciatica  Comments:  Pain controlled. Will try patient on gabapentin  Orders:  -     gabapentin (NEURONTIN) 300 MG capsule; Take 1 capsule (300 mg total) by mouth 2 (two) times daily.  Dispense: 60 capsule; Refill: 1    Uncomplicated opioid dependence  -     naloxone (NARCAN) 4 mg/actuation Spry; each nostril if not breathing or not responsive  Dispense: 1 each; Refill: 2    Elevated liver enzymes  Comments:  Will refer pt to GI to eval liver enzymes  Orders:  -     Ambulatory referral/consult to Gastroenterology; Future; Expected date: 01/18/2022    Thrombocytopenia    Elevated alkaline phosphatase level  Comments:  Will refer pt to endocrine.  Orders:  -     Ambulatory referral/consult to Gastroenterology; Future; Expected date: 01/18/2022  -     Ambulatory referral/consult to Endocrinology; Future; Expected date: 01/19/2022    Anxiety  Comments:  Stable. Will continue to monitor symptoms.     Lymphopenia  Comments:  Possible hepatic disease. Will refer to GI, may need referral to Ortho.    Other  Lab results discussed and reviewed with patient.    Chronic Pain Notes:  Have discussed the risks and benefits of chronic narcotic therapy including pain control, dependence, and addiction.  The patient is aware and accepts the risks and benefits.  Patient is aware of the risk of taking narcotics combined with benzodiazepines/muscle relaxers/hypnotics, including but not limited to breathing difficulties, coma, and death; accepts the risks; and agrees to use medications only as prescribed.    Follow up in about 3 months (around 4/11/2022) for HTN, Sciatica, Anxiety.        1/12/2022 Marleen Sun

## 2022-01-11 NOTE — Clinical Note
Call pt and let her know I was reviewing her labs and I want her to see an Endocrinologist as well to make sure she doesn't have a bone disease. I referred her to Dr. Laurent in [place]. Unsure if he is taking medicaid, so she may have to call her insurance to find a provider.

## 2022-01-12 ENCOUNTER — TELEPHONE (OUTPATIENT)
Dept: FAMILY MEDICINE | Facility: CLINIC | Age: 47
End: 2022-01-12
Payer: MEDICAID

## 2022-01-12 NOTE — TELEPHONE ENCOUNTER
Per Dr Sun,    Call pt and let her know I was reviewing her labs and I want her to see an Endocrinologist as well to make sure she doesn't have a bone disease. I referred her to Dr. Laurent in [place]. Unsure if he is taking medicaid, so she may have to call her insurance to find a provider.

## 2022-01-20 ENCOUNTER — TELEPHONE (OUTPATIENT)
Dept: FAMILY MEDICINE | Facility: CLINIC | Age: 47
End: 2022-01-20
Payer: MEDICAID

## 2022-01-20 NOTE — TELEPHONE ENCOUNTER
----- Message from Sandra Yanez sent at 1/20/2022  3:19 PM CST -----  Priscilla from Dr. Heredia office calling said they received a referral but do not take her insurance.

## 2022-01-20 NOTE — TELEPHONE ENCOUNTER
Have pt call her insurance and find out if they have an endocrinologist she can see. She can call us back with the info so we can send the referral

## 2022-02-07 DIAGNOSIS — F41.9 ANXIETY: ICD-10-CM

## 2022-02-07 DIAGNOSIS — G89.4 CHRONIC PAIN SYNDROME: ICD-10-CM

## 2022-02-07 RX ORDER — DIAZEPAM 10 MG/1
10 TABLET ORAL 3 TIMES DAILY
Qty: 90 TABLET | Refills: 0 | Status: SHIPPED | OUTPATIENT
Start: 2022-02-07 | End: 2022-03-04 | Stop reason: SDUPTHER

## 2022-02-07 RX ORDER — OXYCODONE HYDROCHLORIDE 15 MG/1
15 TABLET ORAL EVERY 6 HOURS PRN
Qty: 120 TABLET | Refills: 0 | Status: SHIPPED | OUTPATIENT
Start: 2022-02-07 | End: 2022-03-04 | Stop reason: SDUPTHER

## 2022-02-07 NOTE — TELEPHONE ENCOUNTER
"----- Message from Sandra Yanez sent at 2/7/2022  8:37 AM CST -----  Pt calling for refills on Oxycodone, Valium, Gabapentin, Lisinopril, "Zabata" to [facility] in [place]. Cb # [phone]    "

## 2022-03-04 DIAGNOSIS — G89.29 CHRONIC LEFT-SIDED LOW BACK PAIN WITH LEFT-SIDED SCIATICA: ICD-10-CM

## 2022-03-04 DIAGNOSIS — M54.42 CHRONIC LEFT-SIDED LOW BACK PAIN WITH LEFT-SIDED SCIATICA: ICD-10-CM

## 2022-03-04 DIAGNOSIS — I10 ESSENTIAL HYPERTENSION: ICD-10-CM

## 2022-03-04 DIAGNOSIS — G89.4 CHRONIC PAIN SYNDROME: ICD-10-CM

## 2022-03-04 DIAGNOSIS — F41.9 ANXIETY: ICD-10-CM

## 2022-03-04 RX ORDER — DIAZEPAM 10 MG/1
10 TABLET ORAL 3 TIMES DAILY
Qty: 90 TABLET | Refills: 0 | Status: SHIPPED | OUTPATIENT
Start: 2022-03-09 | End: 2022-04-06 | Stop reason: SDUPTHER

## 2022-03-04 RX ORDER — OXYCODONE HYDROCHLORIDE 15 MG/1
15 TABLET ORAL EVERY 6 HOURS PRN
Qty: 120 TABLET | Refills: 0 | Status: SHIPPED | OUTPATIENT
Start: 2022-03-09 | End: 2022-04-06 | Stop reason: SDUPTHER

## 2022-03-04 RX ORDER — GABAPENTIN 300 MG/1
300 CAPSULE ORAL 2 TIMES DAILY
Qty: 60 CAPSULE | Refills: 1 | Status: SHIPPED | OUTPATIENT
Start: 2022-03-09 | End: 2022-04-25 | Stop reason: SDUPTHER

## 2022-03-04 NOTE — TELEPHONE ENCOUNTER
----- Message from Sandra Yanez sent at 3/4/2022  8:47 AM CST -----  Pt calling for refills on Oxycodone, Gabapentin, Valium, Lisinopril, Heart med send to [facility] [phone]

## 2022-03-04 NOTE — TELEPHONE ENCOUNTER
The patient's prescription has been approved and sent to   [facility] - [facility] - [address]  [address]  [place] [address]  Phone: [phone] Fax: [phone]

## 2022-03-21 ENCOUNTER — HOSPITAL ENCOUNTER (EMERGENCY)
Facility: HOSPITAL | Age: 47
Discharge: HOME | End: 2022-03-21
Attending: EMERGENCY MEDICINE
Payer: MEDICAID

## 2022-03-21 VITALS
RESPIRATION RATE: 20 BRPM | SYSTOLIC BLOOD PRESSURE: 177 MMHG | BODY MASS INDEX: 30.68 KG/M2 | OXYGEN SATURATION: 99 % | HEIGHT: 70 IN | WEIGHT: 214.31 LBS | DIASTOLIC BLOOD PRESSURE: 77 MMHG | TEMPERATURE: 99 F | HEART RATE: 76 BPM

## 2022-03-21 DIAGNOSIS — J18.9 PNEUMONIA DUE TO INFECTIOUS ORGANISM, UNSPECIFIED LATERALITY, UNSPECIFIED PART OF LUNG: ICD-10-CM

## 2022-03-21 DIAGNOSIS — R55 SYNCOPE: ICD-10-CM

## 2022-03-21 DIAGNOSIS — E87.6 HYPOKALEMIA: Primary | ICD-10-CM

## 2022-03-21 LAB
ALBUMIN SERPL BCP-MCNC: 2.7 G/DL (ref 3.5–5.2)
ALP SERPL-CCNC: 246 U/L (ref 55–135)
ALT SERPL W/O P-5'-P-CCNC: 18 U/L (ref 10–44)
ANION GAP SERPL CALC-SCNC: 12 MMOL/L (ref 8–16)
AST SERPL-CCNC: 39 U/L (ref 10–40)
BASOPHILS # BLD AUTO: 0.01 K/UL (ref 0–0.2)
BASOPHILS NFR BLD: 0.3 % (ref 0–1.9)
BILIRUB SERPL-MCNC: 0.9 MG/DL (ref 0.1–1)
BNP SERPL-MCNC: 130 PG/ML (ref 0–99)
BUN SERPL-MCNC: 8 MG/DL (ref 6–20)
CALCIUM SERPL-MCNC: 8.8 MG/DL (ref 8.7–10.5)
CHLORIDE SERPL-SCNC: 103 MMOL/L (ref 95–110)
CO2 SERPL-SCNC: 26 MMOL/L (ref 23–29)
CREAT SERPL-MCNC: 0.7 MG/DL (ref 0.5–1.4)
CTP QC/QA: YES
CTP QC/QA: YES
DIFFERENTIAL METHOD: ABNORMAL
EOSINOPHIL # BLD AUTO: 0 K/UL (ref 0–0.5)
EOSINOPHIL NFR BLD: 0 % (ref 0–8)
ERYTHROCYTE [DISTWIDTH] IN BLOOD BY AUTOMATED COUNT: 13.5 % (ref 11.5–14.5)
EST. GFR  (AFRICAN AMERICAN): >60 ML/MIN/1.73 M^2
EST. GFR  (NON AFRICAN AMERICAN): >60 ML/MIN/1.73 M^2
GLUCOSE SERPL-MCNC: 124 MG/DL (ref 70–110)
HCT VFR BLD AUTO: 30.8 % (ref 37–48.5)
HGB BLD-MCNC: 9.9 G/DL (ref 12–16)
IMM GRANULOCYTES # BLD AUTO: 0.01 K/UL (ref 0–0.04)
IMM GRANULOCYTES NFR BLD AUTO: 0.3 % (ref 0–0.5)
LYMPHOCYTES # BLD AUTO: 0.6 K/UL (ref 1–4.8)
LYMPHOCYTES NFR BLD: 18.4 % (ref 18–48)
MAGNESIUM SERPL-MCNC: 1.9 MG/DL (ref 1.6–2.6)
MCH RBC QN AUTO: 27 PG (ref 27–31)
MCHC RBC AUTO-ENTMCNC: 32.1 G/DL (ref 32–36)
MCV RBC AUTO: 84 FL (ref 82–98)
MONOCYTES # BLD AUTO: 0.1 K/UL (ref 0.3–1)
MONOCYTES NFR BLD: 3.6 % (ref 4–15)
NEUTROPHILS # BLD AUTO: 2.6 K/UL (ref 1.8–7.7)
NEUTROPHILS NFR BLD: 77.4 % (ref 38–73)
NRBC BLD-RTO: 0 /100 WBC
PLATELET # BLD AUTO: 136 K/UL (ref 150–450)
PMV BLD AUTO: 9.8 FL (ref 9.2–12.9)
POC MOLECULAR INFLUENZA A AGN: NEGATIVE
POC MOLECULAR INFLUENZA B AGN: NEGATIVE
POTASSIUM SERPL-SCNC: 2.8 MMOL/L (ref 3.5–5.1)
PROT SERPL-MCNC: 7.3 G/DL (ref 6–8.4)
RBC # BLD AUTO: 3.67 M/UL (ref 4–5.4)
SARS-COV-2 RDRP RESP QL NAA+PROBE: NEGATIVE
SODIUM SERPL-SCNC: 141 MMOL/L (ref 136–145)
TROPONIN I SERPL DL<=0.01 NG/ML-MCNC: 0.02 NG/ML (ref 0–0.03)
WBC # BLD AUTO: 3.37 K/UL (ref 3.9–12.7)

## 2022-03-21 PROCEDURE — 96365 THER/PROPH/DIAG IV INF INIT: CPT

## 2022-03-21 PROCEDURE — 93005 ELECTROCARDIOGRAM TRACING: CPT

## 2022-03-21 PROCEDURE — 84484 ASSAY OF TROPONIN QUANT: CPT | Performed by: EMERGENCY MEDICINE

## 2022-03-21 PROCEDURE — 83880 ASSAY OF NATRIURETIC PEPTIDE: CPT | Performed by: EMERGENCY MEDICINE

## 2022-03-21 PROCEDURE — 25000003 PHARM REV CODE 250: Performed by: EMERGENCY MEDICINE

## 2022-03-21 PROCEDURE — 83735 ASSAY OF MAGNESIUM: CPT | Performed by: EMERGENCY MEDICINE

## 2022-03-21 PROCEDURE — 96361 HYDRATE IV INFUSION ADD-ON: CPT

## 2022-03-21 PROCEDURE — 99284 EMERGENCY DEPT VISIT MOD MDM: CPT | Mod: 25

## 2022-03-21 PROCEDURE — 85025 COMPLETE CBC W/AUTO DIFF WBC: CPT | Performed by: EMERGENCY MEDICINE

## 2022-03-21 PROCEDURE — 96375 TX/PRO/DX INJ NEW DRUG ADDON: CPT

## 2022-03-21 PROCEDURE — 93010 ELECTROCARDIOGRAM REPORT: CPT | Mod: ,,, | Performed by: INTERNAL MEDICINE

## 2022-03-21 PROCEDURE — U0002 COVID-19 LAB TEST NON-CDC: HCPCS | Performed by: EMERGENCY MEDICINE

## 2022-03-21 PROCEDURE — 80053 COMPREHEN METABOLIC PANEL: CPT | Performed by: EMERGENCY MEDICINE

## 2022-03-21 PROCEDURE — 63600175 PHARM REV CODE 636 W HCPCS: Performed by: EMERGENCY MEDICINE

## 2022-03-21 PROCEDURE — 93010 EKG 12-LEAD: ICD-10-PCS | Mod: ,,, | Performed by: INTERNAL MEDICINE

## 2022-03-21 RX ORDER — POTASSIUM CHLORIDE 20 MEQ/1
40 TABLET, EXTENDED RELEASE ORAL
Status: COMPLETED | OUTPATIENT
Start: 2022-03-21 | End: 2022-03-21

## 2022-03-21 RX ORDER — ACETAMINOPHEN 500 MG
1000 TABLET ORAL
Status: COMPLETED | OUTPATIENT
Start: 2022-03-21 | End: 2022-03-21

## 2022-03-21 RX ORDER — KETOROLAC TROMETHAMINE 10 MG/1
10 TABLET, FILM COATED ORAL EVERY 6 HOURS PRN
Qty: 15 TABLET | Refills: 0 | Status: SHIPPED | OUTPATIENT
Start: 2022-03-21 | End: 2022-08-03

## 2022-03-21 RX ORDER — BENZONATATE 100 MG/1
100 CAPSULE ORAL 3 TIMES DAILY PRN
Qty: 20 CAPSULE | Refills: 0 | Status: SHIPPED | OUTPATIENT
Start: 2022-03-21 | End: 2022-04-25

## 2022-03-21 RX ORDER — ONDANSETRON 4 MG/1
4 TABLET, ORALLY DISINTEGRATING ORAL EVERY 8 HOURS PRN
Qty: 20 TABLET | Refills: 0 | OUTPATIENT
Start: 2022-03-21 | End: 2022-06-03

## 2022-03-21 RX ORDER — BENZONATATE 100 MG/1
100 CAPSULE ORAL
Status: COMPLETED | OUTPATIENT
Start: 2022-03-21 | End: 2022-03-21

## 2022-03-21 RX ORDER — ONDANSETRON 2 MG/ML
4 INJECTION INTRAMUSCULAR; INTRAVENOUS
Status: COMPLETED | OUTPATIENT
Start: 2022-03-21 | End: 2022-03-21

## 2022-03-21 RX ORDER — KETOROLAC TROMETHAMINE 30 MG/ML
15 INJECTION, SOLUTION INTRAMUSCULAR; INTRAVENOUS
Status: COMPLETED | OUTPATIENT
Start: 2022-03-21 | End: 2022-03-21

## 2022-03-21 RX ORDER — NAPROXEN 500 MG/1
500 TABLET ORAL 2 TIMES DAILY PRN
Qty: 20 TABLET | Refills: 0 | Status: SHIPPED | OUTPATIENT
Start: 2022-03-21 | End: 2022-03-21 | Stop reason: ALTCHOICE

## 2022-03-21 RX ORDER — POTASSIUM CHLORIDE 7.45 MG/ML
10 INJECTION INTRAVENOUS
Status: COMPLETED | OUTPATIENT
Start: 2022-03-21 | End: 2022-03-21

## 2022-03-21 RX ORDER — DOXYCYCLINE 100 MG/1
100 CAPSULE ORAL 2 TIMES DAILY
Qty: 10 CAPSULE | Refills: 0 | Status: SHIPPED | OUTPATIENT
Start: 2022-03-21 | End: 2022-03-26

## 2022-03-21 RX ORDER — POTASSIUM CHLORIDE 20 MEQ/1
20 TABLET, EXTENDED RELEASE ORAL 2 TIMES DAILY
Qty: 6 TABLET | Refills: 0 | Status: SHIPPED | OUTPATIENT
Start: 2022-03-21 | End: 2022-03-24

## 2022-03-21 RX ADMIN — BENZONATATE 100 MG: 100 CAPSULE ORAL at 04:03

## 2022-03-21 RX ADMIN — ONDANSETRON 4 MG: 2 INJECTION, SOLUTION INTRAMUSCULAR; INTRAVENOUS at 02:03

## 2022-03-21 RX ADMIN — POTASSIUM CHLORIDE 40 MEQ: 1500 TABLET, EXTENDED RELEASE ORAL at 04:03

## 2022-03-21 RX ADMIN — POTASSIUM CHLORIDE 10 MEQ: 7.46 INJECTION, SOLUTION INTRAVENOUS at 04:03

## 2022-03-21 RX ADMIN — KETOROLAC TROMETHAMINE 15 MG: 30 INJECTION, SOLUTION INTRAMUSCULAR at 04:03

## 2022-03-21 RX ADMIN — SODIUM CHLORIDE 1000 ML: 0.9 INJECTION, SOLUTION INTRAVENOUS at 02:03

## 2022-03-21 RX ADMIN — ACETAMINOPHEN 1000 MG: 500 TABLET ORAL at 03:03

## 2022-03-21 RX ADMIN — BENZONATATE 100 MG: 100 CAPSULE ORAL at 03:03

## 2022-03-21 NOTE — ED PROVIDER NOTES
SCRIBE #1 NOTE: I, Calixto Fiordaliza, am scribing for, and in the presence of, Samuel Moore MD. I have scribed the entire note.      History      Chief Complaint   Patient presents with    Syncopal Episode     Cough, Vomiting, Diarrhea x 1 week. Pt reports body aches. Pt had possible syncopal episode in Lobby       Review of patient's allergies indicates:   Allergen Reactions    Pcn [penicillins] Hives    Morphine Other (See Comments)     heartburn    Bupropion hcl Other (See Comments)        HPI   HPI    3/21/2022, 2:14 PM   History obtained from the patient      History of Present Illness: Marie Damon is a 46 y.o. female patient who presents to the Emergency Department for dizziness, onset 1 week PTA. Symptoms are intermittent and moderate in severity. No mitigating or exacerbating factors reported. Associated sxs include cough, n/v/d, voice change, generalized myalgias, generalized weakness, SOB, CP, and decreased urine intake. Pt also states that she had a syncopal episode in the ED lob. Patient denies any fever, chills, numbness, rash, dysuria, and all other sxs at this time. No prior Tx reported. No further complaints or concerns at this time.     Arrival mode: Personal vehicle    PCP: Marleen Sun MD       Past Medical History:  Past Medical History:   Diagnosis Date    Bone spur     hip    HTN (hypertension)        Past Surgical History:  Past Surgical History:   Procedure Laterality Date    CHOLECYSTECTOMY      HYSTERECTOMY           Family History:  No family history on file.    Social History:  Social History     Tobacco Use    Smoking status: Current Every Day Smoker     Packs/day: 1.00     Types: Cigarettes    Smokeless tobacco: Never Used   Substance and Sexual Activity    Alcohol use: Not Currently    Drug use: Not Currently    Sexual activity: Not on file       ROS   Review of Systems   Constitutional: Negative for chills and fever.   HENT: Positive for voice change. Negative for sore  throat.    Respiratory: Positive for cough and shortness of breath.    Cardiovascular: Positive for chest pain.   Gastrointestinal: Positive for diarrhea, nausea and vomiting.   Genitourinary: Positive for difficulty urinating. Negative for dysuria.   Musculoskeletal: Positive for myalgias (generalized). Negative for back pain.   Skin: Negative for rash.   Neurological: Positive for dizziness (intermittent), syncope and weakness (generalized). Negative for numbness.   Hematological: Does not bruise/bleed easily.   All other systems reviewed and are negative.    Physical Exam      Initial Vitals [03/21/22 1333]   BP Pulse Resp Temp SpO2   (!) 170/79 78 18 98.9 °F (37.2 °C) 97 %      MAP       --          Physical Exam  Nursing Notes and Vital Signs Reviewed.  Constitutional: Patient is in no acute distress. Well-developed and well-nourished.  Head: Atraumatic. Normocephalic.  Eyes: PERRL. EOM intact. Conjunctivae are not pale. No scleral icterus.  ENT: Mucous membranes are moist. Oropharynx is clear and symmetric.  Neck: Supple. Full ROM. No lymphadenopathy.  Cardiovascular: Regular rate. Regular rhythm. No murmurs, rubs, or gallops. Distal pulses are 2+ and symmetric.  Pulmonary/Chest: No respiratory distress. Clear to auscultation bilaterally. No wheezing or rales.  Abdominal: Soft and non-distended.  There is no tenderness.  No rebound, guarding, or rigidity.   Musculoskeletal: Moves all extremities. No obvious deformities. No edema.  Skin: Warm and dry.  Neurological:  Alert, awake, and appropriate.  Normal speech.  No acute focal neurological deficits are appreciated.  Psychiatric: Normal affect. Good eye contact. Appropriate in content.    ED Course    Procedures  ED Vital Signs:  Vitals:    03/21/22 1333 03/21/22 1440 03/21/22 1444 03/21/22 1540   BP: (!) 170/79   (!) 179/90   Pulse: 78 80  81   Resp: 18   20   Temp: 98.9 °F (37.2 °C)   98.6 °F (37 °C)   TempSrc: Oral   Oral   SpO2: 97%   98%   Weight:   97.2  "kg (214 lb 4.6 oz)    Height: 5' 10" (1.778 m)       03/21/22 1700 03/21/22 1750   BP: (!) 177/77    Pulse: 80 76   Resp: 20 20   Temp: 98.6 °F (37 °C)    TempSrc: Oral    SpO2: 100% 99%   Weight:     Height:         Abnormal Lab Results:  Labs Reviewed   COMPREHENSIVE METABOLIC PANEL - Abnormal; Notable for the following components:       Result Value    Potassium 2.8 (*)     Glucose 124 (*)     Albumin 2.7 (*)     Alkaline Phosphatase 246 (*)     All other components within normal limits   CBC W/ AUTO DIFFERENTIAL - Abnormal; Notable for the following components:    WBC 3.37 (*)     RBC 3.67 (*)     Hemoglobin 9.9 (*)     Hematocrit 30.8 (*)     Platelets 136 (*)     Lymph # 0.6 (*)     Mono # 0.1 (*)     Gran % 77.4 (*)     Mono % 3.6 (*)     All other components within normal limits   B-TYPE NATRIURETIC PEPTIDE - Abnormal; Notable for the following components:     (*)     All other components within normal limits   TROPONIN I   MAGNESIUM   SARS-COV-2 RDRP GENE    Narrative:     This test utilizes isothermal nucleic acid amplification   technology to detect the SARS-CoV-2 RdRp nucleic acid segment.   The analytical sensitivity (limit of detection) is 125 genome   equivalents/mL.   A POSITIVE result implies infection with the SARS-CoV-2 virus;   the patient is presumed to be contagious.     A NEGATIVE result means that SARS-CoV-2 nucleic acids are not   present above the limit of detection. A NEGATIVE result should be   treated as presumptive. It does not rule out the possibility of   COVID-19 and should not be the sole basis for treatment decisions.   If COVID-19 is strongly suspected based on clinical and exposure   history, re-testing using an alternate molecular assay should be   considered.   This test is only for use under the Food and Drug   Administration s Emergency Use Authorization (EUA).   Commercial kits are provided by [employer].   Performance characteristics of the EUA have been " independently   verified by [facility] Department of   Pathology and Laboratory Medicine.   _________________________________________________________________   The authorized Fact Sheet for Healthcare Providers and the authorized Fact   Sheet for Patients of the ID NOW COVID-19 are available on the FDA   website:     https://www.fda.gov/media/662563/download  https://www.fda.gov/media/676139/download           POCT INFLUENZA A/B MOLECULAR        All Lab Results:  Results for orders placed or performed during the hospital encounter of 03/21/22   Comprehensive metabolic panel   Result Value Ref Range    Sodium 141 136 - 145 mmol/L    Potassium 2.8 (L) 3.5 - 5.1 mmol/L    Chloride 103 95 - 110 mmol/L    CO2 26 23 - 29 mmol/L    Glucose 124 (H) 70 - 110 mg/dL    BUN 8 6 - 20 mg/dL    Creatinine 0.7 0.5 - 1.4 mg/dL    Calcium 8.8 8.7 - 10.5 mg/dL    Total Protein 7.3 6.0 - 8.4 g/dL    Albumin 2.7 (L) 3.5 - 5.2 g/dL    Total Bilirubin 0.9 0.1 - 1.0 mg/dL    Alkaline Phosphatase 246 (H) 55 - 135 U/L    AST 39 10 - 40 U/L    ALT 18 10 - 44 U/L    Anion Gap 12 8 - 16 mmol/L    eGFR if African American >60 >60 mL/min/1.73 m^2    eGFR if non African American >60 >60 mL/min/1.73 m^2   CBC auto differential   Result Value Ref Range    WBC 3.37 (L) 3.90 - 12.70 K/uL    RBC 3.67 (L) 4.00 - 5.40 M/uL    Hemoglobin 9.9 (L) 12.0 - 16.0 g/dL    Hematocrit 30.8 (L) 37.0 - 48.5 %    MCV 84 82 - 98 fL    MCH 27.0 27.0 - 31.0 pg    MCHC 32.1 32.0 - 36.0 g/dL    RDW 13.5 11.5 - 14.5 %    Platelets 136 (L) 150 - 450 K/uL    MPV 9.8 9.2 - 12.9 fL    Immature Granulocytes 0.3 0.0 - 0.5 %    Gran # (ANC) 2.6 1.8 - 7.7 K/uL    Immature Grans (Abs) 0.01 0.00 - 0.04 K/uL    Lymph # 0.6 (L) 1.0 - 4.8 K/uL    Mono # 0.1 (L) 0.3 - 1.0 K/uL    Eos # 0.0 0.0 - 0.5 K/uL    Baso # 0.01 0.00 - 0.20 K/uL    nRBC 0 0 /100 WBC    Gran % 77.4 (H) 38.0 - 73.0 %    Lymph % 18.4 18.0 - 48.0 %    Mono % 3.6 (L) 4.0 - 15.0 %    Eosinophil % 0.0 0.0 -  8.0 %    Basophil % 0.3 0.0 - 1.9 %    Differential Method Automated    Troponin I   Result Value Ref Range    Troponin I 0.020 0.000 - 0.026 ng/mL   Brain natriuretic peptide   Result Value Ref Range     (H) 0 - 99 pg/mL   Magnesium   Result Value Ref Range    Magnesium 1.9 1.6 - 2.6 mg/dL   POCT COVID-19 Rapid Screening   Result Value Ref Range    POC Rapid COVID Negative Negative     Acceptable Yes    POCT Influenza A/B Molecular   Result Value Ref Range    POC Molecular Influenza A Ag Negative Negative, Not Reported    POC Molecular Influenza B Ag Negative Negative, Not Reported     Acceptable Yes      Imaging Results:  Imaging Results          X-Ray Chest AP Portable (Final result)  Result time 03/21/22 15:20:21    Final result by YEIMY Rothman Sr., MD (03/21/22 15:20:21)                 Impression:      There is a subtle amount of haziness scattered throughout both lungs.  This is consistent with the patient's history and characteristic of pneumonia.      Electronically signed by: Jacek Rothman MD  Date:    03/21/2022  Time:    15:20             Narrative:    EXAMINATION:  XR CHEST AP PORTABLE    CLINICAL HISTORY:  cough;    COMPARISON:  10/15/2018    FINDINGS:  The size of the heart is normal.  There is a subtle amount of haziness scattered throughout both lungs.  There is no pneumothorax.  The costophrenic angles are sharp.                               The EKG was ordered, reviewed, and independently interpreted by the ED provider.  Interpretation time: 13:48  Rate: 83 BPM  Rhythm: normal sinus rhythm  Interpretation: Incomplete RBBB. LVH. Nonspecific ST and T wave abnormality. Mildly Prolonged QT. No STEMI.           The Emergency Provider reviewed the vital signs and test results, which are outlined above.    ED Discussion     5:35 PM: Reassessed pt at this time. Pt is able to tolerate PO in the ED. Discussed with pt all pertinent ED information and results.  Discussed pt dx and plan of tx. Gave pt all f/u and return to the ED instructions. All questions and concerns were addressed at this time. Pt expresses understanding of information and instructions, and is comfortable with plan to discharge. Pt is stable for discharge.    I discussed with patient and/or family/caretaker that evaluation in the ED does not suggest any emergent or life threatening medical conditions requiring immediate intervention beyond what was provided in the ED, and I believe patient is safe for discharge.  Regardless, an unremarkable evaluation in the ED does not preclude the development or presence of a serious of life threatening condition. As such, patient was instructed to return immediately for any worsening or change in current symptoms.         ED Medication(s):  Medications   sodium chloride 0.9% bolus 1,000 mL (0 mLs Intravenous Stopped 3/21/22 1751)   ondansetron injection 4 mg (4 mg Intravenous Given 3/21/22 1430)   acetaminophen tablet 1,000 mg (1,000 mg Oral Given 3/21/22 1504)   benzonatate capsule 100 mg (100 mg Oral Given 3/21/22 1504)   potassium chloride 10 mEq in 100 mL IVPB ( Intravenous Stopped 3/21/22 1749)   potassium chloride SA CR tablet 40 mEq (40 mEq Oral Given 3/21/22 1625)   benzonatate capsule 100 mg (100 mg Oral Given 3/21/22 1625)   ketorolac injection 15 mg (15 mg Intravenous Given 3/21/22 1626)        Follow-up Information     Marleen Sun MD In 2 days.    Specialty: Family Medicine  Contact information:  [address]  [address]  [facility] [address]  [phone]             [facility] - Emergency Dept..    Specialty: Emergency Medicine  Why: As needed, If symptoms worsen  Contact information:  [address]  [facility] [address] [phone]                      Discharge Medication List as of 3/21/2022  5:39 PM      START taking these medications    Details   benzonatate (TESSALON) 100 MG capsule Take 1  capsule (100 mg total) by mouth 3 (three) times daily as needed for Cough., Starting Mon 3/21/2022, Print      doxycycline (VIBRAMYCIN) 100 MG Cap Take 1 capsule (100 mg total) by mouth 2 (two) times daily. for 5 days, Starting Mon 3/21/2022, Until Sat 3/26/2022, Print      !! ondansetron (ZOFRAN-ODT) 4 MG TbDL Take 1 tablet (4 mg total) by mouth every 8 (eight) hours as needed (nausea)., Starting Mon 3/21/2022, Print      potassium chloride SA (K-DUR,KLOR-CON) 20 MEQ tablet Take 1 tablet (20 mEq total) by mouth 2 (two) times daily. for 3 days, Starting Mon 3/21/2022, Until Thu 3/24/2022, Print      naproxen (NAPROSYN) 500 MG tablet Take 1 tablet (500 mg total) by mouth 2 (two) times daily as needed (pain)., Starting Mon 3/21/2022, Print       !! - Potential duplicate medications found. Please discuss with provider.            Medical Decision Making    Medical Decision Making:   Clinical Tests:   Lab Tests: Ordered and Reviewed  Radiological Study: Ordered and Reviewed  Medical Tests: Ordered and Reviewed           Scribe Attestation:   Scribe #1: I performed the above scribed service and the documentation accurately describes the services I performed. I attest to the accuracy of the note.    Attending:   Physician Attestation Statement for Scribe #1: I, Samuel Moore MD, personally performed the services described in this documentation, as scribed by Calixto Mancera, in my presence, and it is both accurate and complete.          Clinical Impression       ICD-10-CM ICD-9-CM   1. Hypokalemia  E87.6 276.8   2. Syncope  R55 780.2   3. Pneumonia due to infectious organism, unspecified laterality, unspecified part of lung  J18.9 486       Disposition:   Disposition: Discharged  Condition: Stable         Samuel Moore MD  03/21/22 3816

## 2022-04-06 DIAGNOSIS — F41.9 ANXIETY: ICD-10-CM

## 2022-04-06 DIAGNOSIS — G89.4 CHRONIC PAIN SYNDROME: ICD-10-CM

## 2022-04-06 RX ORDER — DIAZEPAM 10 MG/1
10 TABLET ORAL 3 TIMES DAILY
Qty: 90 TABLET | Refills: 0 | Status: SHIPPED | OUTPATIENT
Start: 2022-04-08 | End: 2022-04-25 | Stop reason: SDUPTHER

## 2022-04-06 RX ORDER — OXYCODONE HYDROCHLORIDE 15 MG/1
15 TABLET ORAL EVERY 6 HOURS PRN
Qty: 120 TABLET | Refills: 0 | Status: SHIPPED | OUTPATIENT
Start: 2022-04-08 | End: 2022-04-25 | Stop reason: SDUPTHER

## 2022-04-06 NOTE — TELEPHONE ENCOUNTER
prescription sent to   [facility] - TIMMY Guzmán - [address]  [address] [address]  Phone: [phone] Fax: [phone]

## 2022-04-25 ENCOUNTER — OFFICE VISIT (OUTPATIENT)
Dept: FAMILY MEDICINE | Facility: CLINIC | Age: 47
End: 2022-04-25
Payer: MEDICAID

## 2022-04-25 VITALS
HEART RATE: 84 BPM | WEIGHT: 212 LBS | SYSTOLIC BLOOD PRESSURE: 118 MMHG | DIASTOLIC BLOOD PRESSURE: 70 MMHG | HEIGHT: 70 IN | BODY MASS INDEX: 30.35 KG/M2

## 2022-04-25 DIAGNOSIS — M54.42 CHRONIC LEFT-SIDED LOW BACK PAIN WITH LEFT-SIDED SCIATICA: ICD-10-CM

## 2022-04-25 DIAGNOSIS — G89.4 CHRONIC PAIN SYNDROME: ICD-10-CM

## 2022-04-25 DIAGNOSIS — F41.9 ANXIETY: ICD-10-CM

## 2022-04-25 DIAGNOSIS — G89.29 CHRONIC LEFT-SIDED LOW BACK PAIN WITH LEFT-SIDED SCIATICA: ICD-10-CM

## 2022-04-25 DIAGNOSIS — I10 ESSENTIAL HYPERTENSION: Primary | ICD-10-CM

## 2022-04-25 PROCEDURE — 3074F SYST BP LT 130 MM HG: CPT | Mod: S$GLB,,, | Performed by: FAMILY MEDICINE

## 2022-04-25 PROCEDURE — 3008F BODY MASS INDEX DOCD: CPT | Mod: S$GLB,,, | Performed by: FAMILY MEDICINE

## 2022-04-25 PROCEDURE — 3074F PR MOST RECENT SYSTOLIC BLOOD PRESSURE < 130 MM HG: ICD-10-PCS | Mod: S$GLB,,, | Performed by: FAMILY MEDICINE

## 2022-04-25 PROCEDURE — 99214 OFFICE O/P EST MOD 30 MIN: CPT | Mod: S$GLB,,, | Performed by: FAMILY MEDICINE

## 2022-04-25 PROCEDURE — 1160F PR REVIEW ALL MEDS BY PRESCRIBER/CLIN PHARMACIST DOCUMENTED: ICD-10-PCS | Mod: S$GLB,,, | Performed by: FAMILY MEDICINE

## 2022-04-25 PROCEDURE — 3066F NEPHROPATHY DOC TX: CPT | Mod: S$GLB,,, | Performed by: FAMILY MEDICINE

## 2022-04-25 PROCEDURE — 3008F PR BODY MASS INDEX (BMI) DOCUMENTED: ICD-10-PCS | Mod: S$GLB,,, | Performed by: FAMILY MEDICINE

## 2022-04-25 PROCEDURE — 3061F PR NEG MICROALBUMINURIA RESULT DOCUMENTED/REVIEW: ICD-10-PCS | Mod: S$GLB,,, | Performed by: FAMILY MEDICINE

## 2022-04-25 PROCEDURE — 4010F ACE/ARB THERAPY RXD/TAKEN: CPT | Mod: S$GLB,,, | Performed by: FAMILY MEDICINE

## 2022-04-25 PROCEDURE — 99214 PR OFFICE/OUTPT VISIT, EST, LEVL IV, 30-39 MIN: ICD-10-PCS | Mod: S$GLB,,, | Performed by: FAMILY MEDICINE

## 2022-04-25 PROCEDURE — 3078F DIAST BP <80 MM HG: CPT | Mod: S$GLB,,, | Performed by: FAMILY MEDICINE

## 2022-04-25 PROCEDURE — 3066F PR DOCUMENTATION OF TREATMENT FOR NEPHROPATHY: ICD-10-PCS | Mod: S$GLB,,, | Performed by: FAMILY MEDICINE

## 2022-04-25 PROCEDURE — 3061F NEG MICROALBUMINURIA REV: CPT | Mod: S$GLB,,, | Performed by: FAMILY MEDICINE

## 2022-04-25 PROCEDURE — 1160F RVW MEDS BY RX/DR IN RCRD: CPT | Mod: S$GLB,,, | Performed by: FAMILY MEDICINE

## 2022-04-25 PROCEDURE — 3078F PR MOST RECENT DIASTOLIC BLOOD PRESSURE < 80 MM HG: ICD-10-PCS | Mod: S$GLB,,, | Performed by: FAMILY MEDICINE

## 2022-04-25 PROCEDURE — 4010F PR ACE/ARB THEARPY RXD/TAKEN: ICD-10-PCS | Mod: S$GLB,,, | Performed by: FAMILY MEDICINE

## 2022-04-25 RX ORDER — LISINOPRIL 10 MG/1
10 TABLET ORAL DAILY
Qty: 30 TABLET | Refills: 0 | Status: SHIPPED | OUTPATIENT
Start: 2022-04-25 | End: 2022-05-23 | Stop reason: SDUPTHER

## 2022-04-25 RX ORDER — BISOPROLOL FUMARATE 5 MG/1
5 TABLET, FILM COATED ORAL DAILY
Qty: 30 TABLET | Refills: 0 | Status: SHIPPED | OUTPATIENT
Start: 2022-04-25 | End: 2022-05-23 | Stop reason: SDUPTHER

## 2022-04-25 RX ORDER — OXYCODONE HYDROCHLORIDE 15 MG/1
15 TABLET ORAL EVERY 6 HOURS PRN
Qty: 120 TABLET | Refills: 0 | Status: SHIPPED | OUTPATIENT
Start: 2022-04-25 | End: 2022-06-02 | Stop reason: SDUPTHER

## 2022-04-25 RX ORDER — GABAPENTIN 300 MG/1
300 CAPSULE ORAL 2 TIMES DAILY
Qty: 60 CAPSULE | Refills: 0 | Status: SHIPPED | OUTPATIENT
Start: 2022-04-25 | End: 2022-05-23 | Stop reason: SDUPTHER

## 2022-04-25 RX ORDER — DIAZEPAM 10 MG/1
10 TABLET ORAL 3 TIMES DAILY
Qty: 90 TABLET | Refills: 0 | Status: SHIPPED | OUTPATIENT
Start: 2022-04-25 | End: 2022-06-02 | Stop reason: SDUPTHER

## 2022-04-25 NOTE — PROGRESS NOTES
SUBJECTIVE:    Patient ID: Marie Damon is a 46 y.o. female.    Chief Complaint: Hypertension (Went over meds verbally// SW)    Pt seen in order to checkup on acute and chronic conditions.    BP is controlled.  Denies CP/SOB.   Weight stable.     Anxiety has been doing ok. Pt states she is leaving to go out of town to help her aunt that is about to have a procedure.    Taknig bentyl as needed.  Has not been having too many problems with stomach lately    Pt continues to have chronic back pain. Takes oxycodone as needed for pain. Medication allows her to do ADLs as well as helps her to take care of her total care mother.  Since last visit, her son has moved out.  Gets a lot of issues with cramps in both her legs, but the right is worse than leg. Because her left leg bothers her a lot, thinks she puts a lot of pressure on her left leg. Thinks she could benefit from a muscle relaxer.     Reports having PNA a month or so ago. Went to [facility] and treated her. Had diarrhea, and was throwing up, etc. Found to have low potassium, 2.8 and Hct. Was given some tessalon, anbx.   Pt was likely having some withdrawal due to not being able to keep her meds down. They gave her something for nausea.    No labs done.   Pt denies easy bruising or bleeding. Denies alcohol use.       Admission on 03/21/2022, Discharged on 03/21/2022   Component Date Value Ref Range Status    POC Rapid COVID 03/21/2022 Negative  Negative Final     Acceptable 03/21/2022 Yes   Final    Sodium 03/21/2022 141  136 - 145 mmol/L Final    Potassium 03/21/2022 2.8 (A) 3.5 - 5.1 mmol/L Final    Chloride 03/21/2022 103  95 - 110 mmol/L Final    CO2 03/21/2022 26  23 - 29 mmol/L Final    Glucose 03/21/2022 124 (A) 70 - 110 mg/dL Final    BUN 03/21/2022 8  6 - 20 mg/dL Final    Creatinine 03/21/2022 0.7  0.5 - 1.4 mg/dL Final    Calcium 03/21/2022 8.8  8.7 - 10.5 mg/dL Final    Total Protein 03/21/2022 7.3  6.0 - 8.4 g/dL Final     Albumin 03/21/2022 2.7 (A) 3.5 - 5.2 g/dL Final    Total Bilirubin 03/21/2022 0.9  0.1 - 1.0 mg/dL Final    Alkaline Phosphatase 03/21/2022 246 (A) 55 - 135 U/L Final    AST 03/21/2022 39  10 - 40 U/L Final    ALT 03/21/2022 18  10 - 44 U/L Final    Anion Gap 03/21/2022 12  8 - 16 mmol/L Final    eGFR if African American 03/21/2022 >60  >60 mL/min/1.73 m^2 Final    eGFR if non African American 03/21/2022 >60  >60 mL/min/1.73 m^2 Final    WBC 03/21/2022 3.37 (A) 3.90 - 12.70 K/uL Final    RBC 03/21/2022 3.67 (A) 4.00 - 5.40 M/uL Final    Hemoglobin 03/21/2022 9.9 (A) 12.0 - 16.0 g/dL Final    Hematocrit 03/21/2022 30.8 (A) 37.0 - 48.5 % Final    MCV 03/21/2022 84  82 - 98 fL Final    MCH 03/21/2022 27.0  27.0 - 31.0 pg Final    MCHC 03/21/2022 32.1  32.0 - 36.0 g/dL Final    RDW 03/21/2022 13.5  11.5 - 14.5 % Final    Platelets 03/21/2022 136 (A) 150 - 450 K/uL Final    MPV 03/21/2022 9.8  9.2 - 12.9 fL Final    Immature Granulocytes 03/21/2022 0.3  0.0 - 0.5 % Final    Gran # (ANC) 03/21/2022 2.6  1.8 - 7.7 K/uL Final    Immature Grans (Abs) 03/21/2022 0.01  0.00 - 0.04 K/uL Final    Lymph # 03/21/2022 0.6 (A) 1.0 - 4.8 K/uL Final    Mono # 03/21/2022 0.1 (A) 0.3 - 1.0 K/uL Final    Eos # 03/21/2022 0.0  0.0 - 0.5 K/uL Final    Baso # 03/21/2022 0.01  0.00 - 0.20 K/uL Final    nRBC 03/21/2022 0  0 /100 WBC Final    Gran % 03/21/2022 77.4 (A) 38.0 - 73.0 % Final    Lymph % 03/21/2022 18.4  18.0 - 48.0 % Final    Mono % 03/21/2022 3.6 (A) 4.0 - 15.0 % Final    Eosinophil % 03/21/2022 0.0  0.0 - 8.0 % Final    Basophil % 03/21/2022 0.3  0.0 - 1.9 % Final    Differential Method 03/21/2022 Automated   Final    Troponin I 03/21/2022 0.020  0.000 - 0.026 ng/mL Final    BNP 03/21/2022 130 (A) 0 - 99 pg/mL Final    Magnesium 03/21/2022 1.9  1.6 - 2.6 mg/dL Final    POC Molecular Influenza A Ag 03/21/2022 Negative  Negative, Not Reported Final    POC Molecular Influenza B Ag 03/21/2022  Negative  Negative, Not Reported Final     Acceptable 03/21/2022 Yes   Final       Past Medical History:   Diagnosis Date    Bone spur     hip    HTN (hypertension)      Social History     Socioeconomic History    Marital status:    Tobacco Use    Smoking status: Current Every Day Smoker     Packs/day: 1.00     Types: Cigarettes    Smokeless tobacco: Never Used   Substance and Sexual Activity    Alcohol use: Not Currently    Drug use: Not Currently     Past Surgical History:   Procedure Laterality Date    CHOLECYSTECTOMY      HYSTERECTOMY       History reviewed. No pertinent family history.    Review of patient's allergies indicates:   Allergen Reactions    Pcn [penicillins] Hives    Morphine Other (See Comments)     heartburn    Bupropion hcl Other (See Comments)       Current Outpatient Medications:     dicyclomine (BENTYL) 10 MG capsule, Take 1 capsule (10 mg total) by mouth 4 (four) times daily before meals and nightly., Disp: 120 capsule, Rfl: 1    famotidine (PEPCID) 20 MG tablet, Take 1 tablet (20 mg total) by mouth 2 (two) times daily., Disp: 20 tablet, Rfl: 0    ketorolac (TORADOL) 10 mg tablet, Take 1 tablet (10 mg total) by mouth every 6 (six) hours as needed for Pain., Disp: 15 tablet, Rfl: 0    naloxone (NARCAN) 4 mg/actuation Spry, each nostril if not breathing or not responsive, Disp: 1 each, Rfl: 2    ondansetron (ZOFRAN-ODT) 4 MG TbDL, Take 1 tablet (4 mg total) by mouth every 8 (eight) hours as needed (nausea)., Disp: 20 tablet, Rfl: 0    bisoprolol (ZEBETA) 5 MG tablet, Take 1 tablet (5 mg total) by mouth once daily., Disp: 30 tablet, Rfl: 0    diazePAM (VALIUM) 10 MG Tab, Take 1 tablet (10 mg total) by mouth 3 (three) times daily., Disp: 90 tablet, Rfl: 0    gabapentin (NEURONTIN) 300 MG capsule, Take 1 capsule (300 mg total) by mouth 2 (two) times daily., Disp: 60 capsule, Rfl: 0    lisinopriL 10 MG tablet, Take 1 tablet (10 mg total) by mouth once  "daily., Disp: 30 tablet, Rfl: 0    oxyCODONE (ROXICODONE) 15 MG Tab, Take 1 tablet (15 mg total) by mouth every 6 (six) hours as needed for Pain., Disp: 120 tablet, Rfl: 0    Review of Systems   Constitutional: Negative for appetite change, fatigue, fever and unexpected weight change.   Respiratory: Negative for cough, chest tightness, shortness of breath and wheezing.    Cardiovascular: Negative for chest pain and leg swelling.   Gastrointestinal: Positive for abdominal pain, diarrhea, nausea and vomiting. Negative for constipation.        -heartburn   Genitourinary: Negative for difficulty urinating, dysuria, frequency and urgency.   Musculoskeletal: Negative for arthralgias, back pain, myalgias and neck pain.   Skin: Negative for rash.   Neurological: Negative for dizziness, weakness, numbness and headaches.   Hematological: Does not bruise/bleed easily.   Psychiatric/Behavioral: Negative for dysphoric mood, sleep disturbance and suicidal ideas. The patient is nervous/anxious.    All other systems reviewed and are negative.         Objective:      Vitals:    04/25/22 1352   BP: 118/70   Pulse: 84   Weight: 96.2 kg (212 lb)   Height: 5' 10" (1.778 m)     Wt Readings from Last 3 Encounters:   04/25/22 96.2 kg (212 lb)   03/21/22 97.2 kg (214 lb 4.6 oz)   01/11/22 91.2 kg (201 lb)       Physical Exam  Vitals reviewed.   Constitutional:       General: She is not in acute distress.     Appearance: Normal appearance. She is well-developed.      Comments: overweight   HENT:      Head: Normocephalic and atraumatic.   Neck:      Thyroid: No thyromegaly.   Cardiovascular:      Rate and Rhythm: Normal rate and regular rhythm.      Heart sounds: Normal heart sounds. No murmur heard.    No friction rub.   Pulmonary:      Effort: Pulmonary effort is normal.      Breath sounds: Normal breath sounds. No wheezing or rales.   Abdominal:      General: There is no distension.      Palpations: Abdomen is soft.      Tenderness: There " is no abdominal tenderness. There is no guarding or rebound.   Musculoskeletal:      Cervical back: Neck supple.   Lymphadenopathy:      Cervical: No cervical adenopathy.   Skin:     General: Skin is warm and dry.      Findings: No rash.   Neurological:      Mental Status: She is alert and oriented to person, place, and time.   Psychiatric:         Attention and Perception: She is attentive.         Speech: Speech normal.         Behavior: Behavior normal.         Thought Content: Thought content normal.         Judgment: Judgment normal.           Assessment:       1. Essential hypertension    2. Anxiety    3. Chronic pain syndrome    4. Chronic left-sided low back pain with left-sided sciatica         Plan:       Essential hypertension  Comments:  Controlled. Will continue to monitor BP on current medication regimen  Orders:  -     lisinopriL 10 MG tablet; Take 1 tablet (10 mg total) by mouth once daily.  Dispense: 30 tablet; Refill: 0  -     bisoprolol (ZEBETA) 5 MG tablet; Take 1 tablet (5 mg total) by mouth once daily.  Dispense: 30 tablet; Refill: 0    Anxiety  Comments:  Controlled. Will continue to monitor symptoms  Orders:  -     diazePAM (VALIUM) 10 MG Tab; Take 1 tablet (10 mg total) by mouth 3 (three) times daily.  Dispense: 90 tablet; Refill: 0    Chronic pain syndrome  Comments:  Pain controlled. Will continue to monitor the pain control and function  Orders:  -     oxyCODONE (ROXICODONE) 15 MG Tab; Take 1 tablet (15 mg total) by mouth every 6 (six) hours as needed for Pain.  Dispense: 120 tablet; Refill: 0  -     gabapentin (NEURONTIN) 300 MG capsule; Take 1 capsule (300 mg total) by mouth 2 (two) times daily.  Dispense: 60 capsule; Refill: 0    Chronic left-sided low back pain with left-sided sciatica    Labs and/or tests have been ordered for the evaluation/monitoring of acute/chronic conditions, to be done just before next visit.    Chronic Pain Notes:  Have discussed the risks and benefits of  chronic narcotic therapy including pain control, dependence, and addiction.  The patient is aware and accepts the risks and benefits.Patient is aware of the risk of taking narcotics combined with benzodiazepines/muscle relaxers/hypnotics, including but not limited to breathing difficulties, coma, and death; accepts the risks; and agrees to use medications only as prescribed.    Follow up in about 3 months (around 7/25/2022) for HTN, Anxiety, Chronic Pain.        4/25/2022 Marleen Sun

## 2022-05-12 ENCOUNTER — TELEPHONE (OUTPATIENT)
Dept: FAMILY MEDICINE | Facility: CLINIC | Age: 47
End: 2022-05-12

## 2022-05-12 DIAGNOSIS — K08.89 PAIN, DENTAL: Primary | ICD-10-CM

## 2022-05-12 RX ORDER — CLINDAMYCIN HYDROCHLORIDE 300 MG/1
300 CAPSULE ORAL EVERY 8 HOURS
Qty: 21 CAPSULE | Refills: 0 | Status: SHIPPED | OUTPATIENT
Start: 2022-05-12 | End: 2022-06-15 | Stop reason: ALTCHOICE

## 2022-05-12 NOTE — TELEPHONE ENCOUNTER
"----- Message from Sandra Yanez sent at 5/12/2022  9:14 AM CDT -----  Pt calling said she needs all of her top teeth need to be pulled but her insurance will not cover it.  She is asking for an abx to be sent in said her whole mouth hurts, swollen gums and "bubble blisters" Pt reminded she is allergic to penicillin. Send Rx to [facility] in [place] [facility] [facility] [phone]    "

## 2022-05-23 DIAGNOSIS — I10 ESSENTIAL HYPERTENSION: ICD-10-CM

## 2022-05-23 DIAGNOSIS — F41.9 ANXIETY: ICD-10-CM

## 2022-05-23 DIAGNOSIS — R11.2 NON-INTRACTABLE VOMITING WITH NAUSEA, UNSPECIFIED VOMITING TYPE: ICD-10-CM

## 2022-05-23 DIAGNOSIS — G89.4 CHRONIC PAIN SYNDROME: ICD-10-CM

## 2022-05-23 RX ORDER — OXYCODONE HYDROCHLORIDE 15 MG/1
15 TABLET ORAL EVERY 6 HOURS PRN
Qty: 120 TABLET | Refills: 0 | Status: CANCELLED | OUTPATIENT
Start: 2022-05-25

## 2022-05-23 RX ORDER — BISOPROLOL FUMARATE 5 MG/1
5 TABLET, FILM COATED ORAL DAILY
Qty: 30 TABLET | Refills: 0 | Status: SHIPPED | OUTPATIENT
Start: 2022-05-25 | End: 2022-07-07 | Stop reason: SDUPTHER

## 2022-05-23 RX ORDER — GABAPENTIN 300 MG/1
300 CAPSULE ORAL 2 TIMES DAILY
Qty: 60 CAPSULE | Refills: 0 | Status: SHIPPED | OUTPATIENT
Start: 2022-05-25 | End: 2022-08-03 | Stop reason: SDUPTHER

## 2022-05-23 RX ORDER — DICYCLOMINE HYDROCHLORIDE 10 MG/1
10 CAPSULE ORAL
Qty: 120 CAPSULE | Refills: 1 | Status: SHIPPED | OUTPATIENT
Start: 2022-05-23 | End: 2022-06-02 | Stop reason: SDUPTHER

## 2022-05-23 RX ORDER — LISINOPRIL 10 MG/1
10 TABLET ORAL DAILY
Qty: 30 TABLET | Refills: 0 | Status: SHIPPED | OUTPATIENT
Start: 2022-05-25 | End: 2022-07-07 | Stop reason: SDUPTHER

## 2022-05-23 RX ORDER — DIAZEPAM 10 MG/1
10 TABLET ORAL 3 TIMES DAILY
Qty: 90 TABLET | Refills: 0 | Status: CANCELLED | OUTPATIENT
Start: 2022-05-25

## 2022-05-23 NOTE — TELEPHONE ENCOUNTER
----- Message from Tatum Garcia sent at 5/23/2022  2:07 PM CDT -----  Refill Oxycodone,Valium,B/P, Heart medication, She wants all her medications filled.. She needs her stomach medication filled. Its a muscle relaxer. [facility]. Pt's #  [phone] GH

## 2022-05-24 ENCOUNTER — TELEPHONE (OUTPATIENT)
Dept: FAMILY MEDICINE | Facility: CLINIC | Age: 47
End: 2022-05-24

## 2022-05-24 NOTE — TELEPHONE ENCOUNTER
----- Message from Sandra Yanez sent at 5/24/2022  8:38 AM CDT -----  Pt calling wants to speak to the nurse said some of refills did not get sent In she wants to know why.  # [phone]

## 2022-05-24 NOTE — TELEPHONE ENCOUNTER
Spoke to pt. She states she seen that Dr. Sun did send in her narcotics early last month pt wanted to make sure she could get her medications around the 9th of June. Informed pt to call when she is getting low on the medications. She voiced understanding.

## 2022-06-02 ENCOUNTER — TELEPHONE (OUTPATIENT)
Dept: FAMILY MEDICINE | Facility: CLINIC | Age: 47
End: 2022-06-02

## 2022-06-02 DIAGNOSIS — R74.8 ELEVATED LIVER ENZYMES: Primary | ICD-10-CM

## 2022-06-02 DIAGNOSIS — F41.9 ANXIETY: ICD-10-CM

## 2022-06-02 DIAGNOSIS — G89.4 CHRONIC PAIN SYNDROME: ICD-10-CM

## 2022-06-02 DIAGNOSIS — R11.2 NON-INTRACTABLE VOMITING WITH NAUSEA, UNSPECIFIED VOMITING TYPE: ICD-10-CM

## 2022-06-02 RX ORDER — OXYCODONE HYDROCHLORIDE 15 MG/1
15 TABLET ORAL EVERY 6 HOURS PRN
Qty: 120 TABLET | Refills: 0 | Status: SHIPPED | OUTPATIENT
Start: 2022-06-07 | End: 2022-07-07 | Stop reason: SDUPTHER

## 2022-06-02 RX ORDER — DIAZEPAM 10 MG/1
10 TABLET ORAL 3 TIMES DAILY
Qty: 90 TABLET | Refills: 0 | Status: SHIPPED | OUTPATIENT
Start: 2022-06-07 | End: 2022-07-07 | Stop reason: SDUPTHER

## 2022-06-02 RX ORDER — DICYCLOMINE HYDROCHLORIDE 10 MG/1
10 CAPSULE ORAL
Qty: 120 CAPSULE | Refills: 1 | Status: ON HOLD | OUTPATIENT
Start: 2022-06-02 | End: 2022-08-25 | Stop reason: HOSPADM

## 2022-06-02 NOTE — TELEPHONE ENCOUNTER
----- Message from Trista Crespo sent at 6/2/2022 10:28 AM CDT -----  Pt needs a referral to a gastroenterologist that takes medicaid. Please advise. Pt #[phone]

## 2022-06-02 NOTE — TELEPHONE ENCOUNTER
The patient's prescription has been approved and sent to   [facility] - [place] [facility] - [address]  [address]  [place] [address]  Phone: [phone] Fax: [phone]

## 2022-06-02 NOTE — TELEPHONE ENCOUNTER
----- Message from Trista Crespo sent at 6/2/2022 10:31 AM CDT -----  Refill for Oxycodone, Valium. [facility]. pt #[phone]

## 2022-06-02 NOTE — TELEPHONE ENCOUNTER
----- Message from Maren Mccrary sent at 6/2/2022  2:06 PM CDT -----  Patient called and would like to have something called in until she can go see the doctor she stated that her stomach is very swollen she would like to have it called into [facility] if any questions please give her a call back at [phone]

## 2022-06-06 ENCOUNTER — TELEPHONE (OUTPATIENT)
Dept: HEMATOLOGY/ONCOLOGY | Facility: CLINIC | Age: 47
End: 2022-06-06
Payer: MEDICAID

## 2022-06-06 NOTE — TELEPHONE ENCOUNTER
----- Message from Audelia Piedra RRT sent at 6/4/2022  4:21 PM CDT -----  Regarding: f/u ED visit  Patient needs follow up after ED visit on 6/3/2022. CT shows severe splenomegaly and labs show thrombocytopenia. Will patient's insurance be accepted?     Patient also has a history of cirrhosis and portal HTN. She will be following up with GI for this.      Thanks!    TONO Valles, RRT  ED Navigator, Case Management  [phone]  [facility]

## 2022-06-06 NOTE — TELEPHONE ENCOUNTER
Received msg and hemonc referral for pt.  Called and sched next avail benign hem appt for next week.    Pt stated her sister is her transportation.

## 2022-06-15 ENCOUNTER — OFFICE VISIT (OUTPATIENT)
Dept: HEMATOLOGY/ONCOLOGY | Facility: CLINIC | Age: 47
End: 2022-06-15
Payer: MEDICAID

## 2022-06-15 ENCOUNTER — LAB VISIT (OUTPATIENT)
Dept: LAB | Facility: HOSPITAL | Age: 47
End: 2022-06-15
Attending: INTERNAL MEDICINE
Payer: MEDICAID

## 2022-06-15 VITALS
HEART RATE: 106 BPM | WEIGHT: 201.5 LBS | SYSTOLIC BLOOD PRESSURE: 148 MMHG | BODY MASS INDEX: 28.21 KG/M2 | TEMPERATURE: 99 F | DIASTOLIC BLOOD PRESSURE: 90 MMHG | OXYGEN SATURATION: 99 % | HEIGHT: 71 IN

## 2022-06-15 DIAGNOSIS — D69.6 THROMBOCYTOPENIA: ICD-10-CM

## 2022-06-15 DIAGNOSIS — D72.819 LEUKOPENIA, UNSPECIFIED TYPE: ICD-10-CM

## 2022-06-15 DIAGNOSIS — R79.89 ABNORMAL LFTS: ICD-10-CM

## 2022-06-15 DIAGNOSIS — R16.1 SPLENOMEGALY: ICD-10-CM

## 2022-06-15 DIAGNOSIS — D69.6 THROMBOCYTOPENIA: Primary | ICD-10-CM

## 2022-06-15 DIAGNOSIS — D64.9 ANEMIA, UNSPECIFIED TYPE: ICD-10-CM

## 2022-06-15 LAB
ALBUMIN SERPL BCP-MCNC: 3.5 G/DL (ref 3.5–5.2)
ALP SERPL-CCNC: 166 U/L (ref 55–135)
ALT SERPL W/O P-5'-P-CCNC: 90 U/L (ref 10–44)
ANION GAP SERPL CALC-SCNC: 8 MMOL/L (ref 8–16)
AST SERPL-CCNC: 60 U/L (ref 10–40)
BILIRUB SERPL-MCNC: 1.1 MG/DL (ref 0.1–1)
BUN SERPL-MCNC: 15 MG/DL (ref 6–20)
CALCIUM SERPL-MCNC: 9.3 MG/DL (ref 8.7–10.5)
CHLORIDE SERPL-SCNC: 107 MMOL/L (ref 95–110)
CO2 SERPL-SCNC: 24 MMOL/L (ref 23–29)
CREAT SERPL-MCNC: 0.8 MG/DL (ref 0.5–1.4)
CRP SERPL-MCNC: 3.1 MG/L (ref 0–8.2)
DIRECT COOMBS (POLY/IGG): NORMAL
ERYTHROCYTE [SEDIMENTATION RATE] IN BLOOD BY WESTERGREN METHOD: 33 MM/HR (ref 0–36)
EST. GFR  (AFRICAN AMERICAN): >60 ML/MIN/1.73 M^2
EST. GFR  (NON AFRICAN AMERICAN): >60 ML/MIN/1.73 M^2
FERRITIN SERPL-MCNC: 23 NG/ML (ref 20–300)
FOLATE SERPL-MCNC: 10.7 NG/ML (ref 4–24)
GLUCOSE SERPL-MCNC: 113 MG/DL (ref 70–110)
HAPTOGLOB SERPL-MCNC: 70 MG/DL (ref 30–250)
IRON SERPL-MCNC: 64 UG/DL (ref 30–160)
LDH SERPL L TO P-CCNC: 246 U/L (ref 110–260)
PATH REV BLD -IMP: NORMAL
POTASSIUM SERPL-SCNC: 3.5 MMOL/L (ref 3.5–5.1)
PROT SERPL-MCNC: 8 G/DL (ref 6–8.4)
RETICS/RBC NFR AUTO: 1 % (ref 0.5–2.5)
SATURATED IRON: 10 % (ref 20–50)
SODIUM SERPL-SCNC: 139 MMOL/L (ref 136–145)
TOTAL IRON BINDING CAPACITY: 611 UG/DL (ref 250–450)
TRANSFERRIN SERPL-MCNC: 413 MG/DL (ref 200–375)
VIT B12 SERPL-MCNC: 992 PG/ML (ref 210–950)

## 2022-06-15 PROCEDURE — 3061F NEG MICROALBUMINURIA REV: CPT | Mod: CPTII,,,

## 2022-06-15 PROCEDURE — 3077F SYST BP >= 140 MM HG: CPT | Mod: CPTII,,,

## 2022-06-15 PROCEDURE — 83010 ASSAY OF HAPTOGLOBIN QUANT: CPT

## 2022-06-15 PROCEDURE — 80053 COMPREHEN METABOLIC PANEL: CPT | Mod: PN

## 2022-06-15 PROCEDURE — 99213 OFFICE O/P EST LOW 20 MIN: CPT | Mod: PBBFAC,PN

## 2022-06-15 PROCEDURE — 3008F PR BODY MASS INDEX (BMI) DOCUMENTED: ICD-10-PCS | Mod: CPTII,,,

## 2022-06-15 PROCEDURE — 87389 HIV-1 AG W/HIV-1&-2 AB AG IA: CPT

## 2022-06-15 PROCEDURE — 85060 BLOOD SMEAR INTERPRETATION: CPT | Mod: ,,, | Performed by: PATHOLOGY

## 2022-06-15 PROCEDURE — 84165 PATHOLOGIST INTERPRETATION SPE: ICD-10-PCS | Mod: 26,,, | Performed by: PATHOLOGY

## 2022-06-15 PROCEDURE — 85652 RBC SED RATE AUTOMATED: CPT

## 2022-06-15 PROCEDURE — 82728 ASSAY OF FERRITIN: CPT

## 2022-06-15 PROCEDURE — 82746 ASSAY OF FOLIC ACID SERUM: CPT

## 2022-06-15 PROCEDURE — 3080F DIAST BP >= 90 MM HG: CPT | Mod: CPTII,,,

## 2022-06-15 PROCEDURE — 4010F ACE/ARB THERAPY RXD/TAKEN: CPT | Mod: CPTII,,,

## 2022-06-15 PROCEDURE — 85025 COMPLETE CBC W/AUTO DIFF WBC: CPT | Mod: PN

## 2022-06-15 PROCEDURE — 86235 NUCLEAR ANTIGEN ANTIBODY: CPT | Mod: 59

## 2022-06-15 PROCEDURE — 99999 PR PBB SHADOW E&M-EST. PATIENT-LVL III: CPT | Mod: PBBFAC,,,

## 2022-06-15 PROCEDURE — 86140 C-REACTIVE PROTEIN: CPT

## 2022-06-15 PROCEDURE — 86880 COOMBS TEST DIRECT: CPT | Mod: PN

## 2022-06-15 PROCEDURE — 3066F NEPHROPATHY DOC TX: CPT | Mod: CPTII,,,

## 2022-06-15 PROCEDURE — 85060 PATHOLOGIST REVIEW: ICD-10-PCS | Mod: ,,, | Performed by: PATHOLOGY

## 2022-06-15 PROCEDURE — 1159F MED LIST DOCD IN RCRD: CPT | Mod: CPTII,,,

## 2022-06-15 PROCEDURE — 99204 PR OFFICE/OUTPT VISIT, NEW, LEVL IV, 45-59 MIN: ICD-10-PCS | Mod: S$PBB,,,

## 2022-06-15 PROCEDURE — 3061F PR NEG MICROALBUMINURIA RESULT DOCUMENTED/REVIEW: ICD-10-PCS | Mod: CPTII,,,

## 2022-06-15 PROCEDURE — 86880 COOMBS TEST DIRECT: CPT

## 2022-06-15 PROCEDURE — 82607 VITAMIN B-12: CPT

## 2022-06-15 PROCEDURE — 83615 LACTATE (LD) (LDH) ENZYME: CPT | Mod: PN

## 2022-06-15 PROCEDURE — 84165 PROTEIN E-PHORESIS SERUM: CPT

## 2022-06-15 PROCEDURE — 1159F PR MEDICATION LIST DOCUMENTED IN MEDICAL RECORD: ICD-10-PCS | Mod: CPTII,,,

## 2022-06-15 PROCEDURE — 80074 ACUTE HEPATITIS PANEL: CPT

## 2022-06-15 PROCEDURE — 85045 AUTOMATED RETICULOCYTE COUNT: CPT | Mod: PN

## 2022-06-15 PROCEDURE — 3080F PR MOST RECENT DIASTOLIC BLOOD PRESSURE >= 90 MM HG: ICD-10-PCS | Mod: CPTII,,,

## 2022-06-15 PROCEDURE — 86382 NEUTRALIZATION TEST VIRAL: CPT

## 2022-06-15 PROCEDURE — 86038 ANTINUCLEAR ANTIBODIES: CPT

## 2022-06-15 PROCEDURE — 84466 ASSAY OF TRANSFERRIN: CPT

## 2022-06-15 PROCEDURE — 3066F PR DOCUMENTATION OF TREATMENT FOR NEPHROPATHY: ICD-10-PCS | Mod: CPTII,,,

## 2022-06-15 PROCEDURE — 4010F PR ACE/ARB THEARPY RXD/TAKEN: ICD-10-PCS | Mod: CPTII,,,

## 2022-06-15 PROCEDURE — 99999 PR PBB SHADOW E&M-EST. PATIENT-LVL III: ICD-10-PCS | Mod: PBBFAC,,,

## 2022-06-15 PROCEDURE — 99204 OFFICE O/P NEW MOD 45 MIN: CPT | Mod: S$PBB,,,

## 2022-06-15 PROCEDURE — 3008F BODY MASS INDEX DOCD: CPT | Mod: CPTII,,,

## 2022-06-15 PROCEDURE — 84165 PROTEIN E-PHORESIS SERUM: CPT | Mod: 26,,, | Performed by: PATHOLOGY

## 2022-06-15 PROCEDURE — 86039 ANTINUCLEAR ANTIBODIES (ANA): CPT

## 2022-06-15 PROCEDURE — 3077F PR MOST RECENT SYSTOLIC BLOOD PRESSURE >= 140 MM HG: ICD-10-PCS | Mod: CPTII,,,

## 2022-06-15 RX ORDER — METRONIDAZOLE 500 MG/1
500 TABLET ORAL 3 TIMES DAILY
COMMUNITY
Start: 2022-06-14 | End: 2022-08-03

## 2022-06-15 RX ORDER — IBUPROFEN 800 MG/1
800 TABLET ORAL EVERY 6 HOURS PRN
COMMUNITY
Start: 2022-05-13 | End: 2022-08-23 | Stop reason: CLARIF

## 2022-06-15 NOTE — PROGRESS NOTES
"PATIENT: Marie Damon  MRN: [id number]  DATE: 6/15/2022      Diagnosis:   1. Thrombocytopenia    2. Leukopenia, unspecified type    3. Anemia, unspecified type    4. Splenomegaly    5. Abnormal LFTs        Chief Complaint: Establish Care (Pt referred for thrombocytopenia and splenomegaly. Pt c/o severe worsening stomach pain, vomiting and diarrhea)      Subjective:   HPI: Ms. Damon is a 46 y.o. female with HTN, anxiety, DDD, chronic low back pain who is seen today at the request of MAYRA Slaughter for a diagnosis of thrombocytopenia.     Patient tells me that she has been iron deficient with anemia most of her life. She suffers with chronic low back pain and follows with pain management. Hypertension is managed by PCP. About a year ago, she began having abdominal pain and discomfort, this was mild and intermittent. As time passed, she began to have abdominal swelling and worsening pain and nausea. She also endorses diarrhea x several months.     6/3/22 she presented to the ER at [facility] for abdominal pain, nausea, and diarrhea.   CT A/P was done with the following findings: "The liver slightly decreased size and irregular contour indicating cirrhosis.  No discrete focal lesion.  Gallbladder removed.  Pancreas normal.  Stomach decompressed.  Spleen is severely enlarged measuring 17.5 cm across.  Numerous periportal, splenic hilar, mesenteric, omental collateral vessels.  Generalized hazy density throughout the mesentery more significant centrally.  Adrenal glands normal.  Kidneys normal size and contour with no hydronephrosis.  Aorta tapers normally.  No bowel obstruction, ascites, or significant lymphadenopathy seen.  Bladder and rectum normal.  Bones intact.  Lung bases clear."  Lab results show patient was anemic with Hgb 10.g/dL, low WBC at 2.61 and platelet count of 64k. Elevated Alk/Phos, AST, ALT. She was discharged with follow up referrals to GI and this office.   Was seen by GI at [facility] " yesterday and was given Rx for Metronidazole that she has not yet started. No additional blood work was done at that visit. She is scheduled to go back in 2 weeks for follow up of results of stool studies.   No family history of hematologic cancers or blood disorders. Mother had thyroid cancer.   Personal history of cholecystectomy and hysterectomy.   She has not experienced recurrent infections or prolonged use of antibiotic.   Social history includes 33 pack year smoking history, minimal alcohol use. 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 She states her drug use last for a few years, she has not used illicit drugs in approximately 15 years. Denies high risk sexual activity.  Endorses fatigue, night sweats, abdominal pain, diarrhea, nausea, decreased appetite.   Denies HA, CP, SOB, cough, constipation, swelling, fevers, chills, easy bruising or bleeding.     Past Medical History:   Past Medical History:   Diagnosis Date    Bone spur     hip    HTN (hypertension)        Past Surgical HIstory:   Past Surgical History:   Procedure Laterality Date    CHOLECYSTECTOMY      HYSTERECTOMY         Family History: No family history on file.    Social History:  reports that she has been smoking cigarettes. She has been smoking about 1.00 pack per day. She has never used smokeless tobacco. She reports previous alcohol use. She reports previous drug use.    Allergies:  Review of patient's allergies indicates:   Allergen Reactions    Pcn [penicillins] Hives    Morphine Other (See Comments)     heartburn    Bupropion hcl Other (See Comments)       Medications:  Current Outpatient Medications   Medication Sig Dispense Refill    bisoprolol (ZEBETA) 5 MG tablet Take 1 tablet (5 mg total) by mouth once daily. 30 tablet 0    diazePAM (VALIUM) 10 MG Tab Take 1 tablet (10 mg total) by mouth 3 (three) times daily. 90 tablet 0    dicyclomine (BENTYL) 10 MG capsule Take 1 capsule (10 mg total) by mouth  4 (four) times daily before meals and nightly. 120 capsule 1    gabapentin (NEURONTIN) 300 MG capsule Take 1 capsule (300 mg total) by mouth 2 (two) times daily. 60 capsule 0    ibuprofen (ADVIL,MOTRIN) 800 MG tablet Take 800 mg by mouth every 6 (six) hours as needed.      ketorolac (TORADOL) 10 mg tablet Take 1 tablet (10 mg total) by mouth every 6 (six) hours as needed for Pain. 15 tablet 0    lisinopriL 10 MG tablet Take 1 tablet (10 mg total) by mouth once daily. 30 tablet 0    naloxone (NARCAN) 4 mg/actuation Spry each nostril if not breathing or not responsive 1 each 2    ondansetron (ZOFRAN-ODT) 4 MG TbDL Take 1 tablet (4 mg total) by mouth every 8 (eight) hours as needed (nausea). 20 tablet 0    oxyCODONE (ROXICODONE) 15 MG Tab Take 1 tablet (15 mg total) by mouth every 6 (six) hours as needed for Pain. 120 tablet 0    famotidine (PEPCID) 20 MG tablet Take 1 tablet (20 mg total) by mouth 2 (two) times daily. 20 tablet 0    metroNIDAZOLE (FLAGYL) 500 MG tablet Take 500 mg by mouth 3 (three) times daily.       No current facility-administered medications for this visit.       Review of Systems   Constitutional: Positive for activity change, appetite change and fatigue. Negative for chills and fever.   HENT: Negative for nosebleeds and trouble swallowing.    Respiratory: Negative for cough and shortness of breath.    Cardiovascular: Negative for chest pain, palpitations and leg swelling.   Gastrointestinal: Positive for abdominal pain, diarrhea and nausea. Negative for abdominal distention, constipation and vomiting.   Genitourinary: Negative for dysuria and hematuria.   Musculoskeletal: Positive for back pain.   Skin: Negative for pallor and rash.   Neurological: Negative for light-headedness and headaches.   Hematological: Negative for adenopathy. Does not bruise/bleed easily.   Psychiatric/Behavioral: The patient is nervous/anxious.        ECOG Performance Status:   ECOG SCORE    0 - Fully  "active-able to carry on all pre-disease performance without restriction         Objective:      Vitals:   Vitals:    06/15/22 1336   BP: (!) 148/90   BP Location: Right arm   Patient Position: Sitting   BP Method: Medium (Manual)   Pulse: 106   Temp: 98.9 °F (37.2 °C)   TempSrc: Temporal   SpO2: 99%   Weight: 91.4 kg (201 lb 8 oz)   Height: 5' 11" (1.803 m)     BMI: Body mass index is 28.1 kg/m².    Physical Exam  Vitals reviewed.   Constitutional:       General: She is not in acute distress.     Appearance: She is not diaphoretic.   HENT:      Head: Normocephalic and atraumatic.      Mouth/Throat:      Mouth: Mucous membranes are moist.   Eyes:      General: No scleral icterus.  Cardiovascular:      Rate and Rhythm: Normal rate and regular rhythm.      Pulses: Normal pulses.      Heart sounds: Normal heart sounds. No murmur heard.  Pulmonary:      Effort: Pulmonary effort is normal. No respiratory distress.      Breath sounds: Normal breath sounds. No wheezing or rhonchi.   Abdominal:      General: Bowel sounds are normal. There is no distension.      Palpations: Abdomen is soft. There is no mass.      Tenderness: There is no abdominal tenderness. There is no guarding or rebound.   Musculoskeletal:      Cervical back: No tenderness.      Right lower leg: No edema.      Left lower leg: No edema.   Lymphadenopathy:      Cervical: No cervical adenopathy.   Skin:     General: Skin is warm.      Coloration: Skin is not jaundiced.      Findings: No bruising or rash.   Neurological:      Mental Status: She is alert and oriented to person, place, and time.      Gait: Gait normal.   Psychiatric:         Mood and Affect: Mood normal.         Behavior: Behavior normal.         Laboratory Data:  Lab Results   Component Value Date    WBC 2.02 (L) 06/15/2022    HGB 10.3 (L) 06/15/2022    HCT 32.3 (L) 06/15/2022    MCV 91 06/15/2022    PLT 69 (L) 06/15/2022          Imaging:   EXAMINATION:  CT ABDOMEN PELVIS WITHOUT " CONTRAST     CLINICAL HISTORY:  Abdominal pain, acute, nonlocalized;     TECHNIQUE:  Low dose axial images, sagittal and coronal reformations were obtained from the lung bases to the pubic symphysis.  .  Oral contrast not given.  .  Automated exposure control used.     COMPARISON:  None     FINDINGS:  The liver slightly decreased size and irregular contour indicating cirrhosis.  No discrete focal lesion.  Gallbladder removed.  Pancreas normal.  Stomach decompressed.  Spleen is severely enlarged measuring 17.5 cm across.  Numerous periportal, splenic hilar, mesenteric, omental collateral vessels.  Generalized hazy density throughout the mesentery more significant centrally.  Adrenal glands normal.  Kidneys normal size and contour with no hydronephrosis.  Aorta tapers normally.  No bowel obstruction, ascites, or significant lymphadenopathy seen.  Bladder and rectum normal.  Bones intact.  Lung bases clear.     Impression:     Generalized mesenteric edema versus mesenteric adenitis.     Findings consistent with cirrhosis and portal hypertension including numerous collateral vessels throughout the abdomen and periportal region and severe splenomegaly.        Electronically signed by: Woody Mckinley MD  Date:                                            06/03/2022  Time:                                           16:06    Assessment:       1. Thrombocytopenia    2. Leukopenia, unspecified type    3. Anemia, unspecified type    4. Splenomegaly    5. Abnormal LFTs         Plan:   Pancytopenia   -CT A/P shows splenomegaly and findings consistent with cirrhosis  -Discussed CT results with patient and need for further work up with multiple blood tests  -Hx of IV drug use certainly puts her at risk for underlying viral cause will check HIV and Hepatitis panel   -Will also check for deficiencies that could be contributing, inflammatory markers   -Labs today:  CBC, smear for review, CMP, ESR, CRP, MICHAEL, HIV, Hep panel,  retic, EVA, LDH, haptoglobin, SPEP, Ferritin, Iron/TIBC, Folate, B12  -Follow up with Dr. Bailon in 7-10 days to review results     Patient queried and all questions were answered.  Assessment/Plan reviewed and approved by Dr. Bailon       Route Chart for Scheduling    Med Onc Chart Routing      Follow up with physician Other. Follow up with Dr. Bailon in 7-10 days to review labs    Follow up with ELISHA    Labs    Lab interval:  Labs today: CBC, smear for review, CMP, ESR, CRP, MICHAEL, HIV, Hep panel, retic, EVA, LDH, haptoglobin, SPEP, Ferritin, Iron/TIBC, Folate, B12   Imaging    Pharmacy appointment    Other referrals

## 2022-06-16 LAB
ALBUMIN SERPL ELPH-MCNC: 3.62 G/DL (ref 3.35–5.55)
ALPHA1 GLOB SERPL ELPH-MCNC: 0.32 G/DL (ref 0.17–0.41)
ALPHA2 GLOB SERPL ELPH-MCNC: 0.56 G/DL (ref 0.43–0.99)
ANA PATTERN 1: NORMAL
ANA SER QL IF: POSITIVE
ANA TITR SER IF: NORMAL {TITER}
B-GLOBULIN SERPL ELPH-MCNC: 0.95 G/DL (ref 0.5–1.1)
BASOPHILS # BLD AUTO: 0.02 K/UL (ref 0–0.2)
BASOPHILS NFR BLD: 1 % (ref 0–1.9)
DIFFERENTIAL METHOD: ABNORMAL
EOSINOPHIL # BLD AUTO: 0 K/UL (ref 0–0.5)
EOSINOPHIL NFR BLD: 1.5 % (ref 0–8)
ERYTHROCYTE [DISTWIDTH] IN BLOOD BY AUTOMATED COUNT: 15.6 % (ref 11.5–14.5)
GAMMA GLOB SERPL ELPH-MCNC: 2.06 G/DL (ref 0.67–1.58)
HBSAG CONFIRMATION: POSITIVE
HCT VFR BLD AUTO: 32.3 % (ref 37–48.5)
HGB BLD-MCNC: 10.3 G/DL (ref 12–16)
HIV 1+2 AB+HIV1 P24 AG SERPL QL IA: NEGATIVE
IMM GRANULOCYTES # BLD AUTO: 0 K/UL (ref 0–0.04)
IMM GRANULOCYTES NFR BLD AUTO: 0 % (ref 0–0.5)
LYMPHOCYTES # BLD AUTO: 0.7 K/UL (ref 1–4.8)
LYMPHOCYTES NFR BLD: 34.7 % (ref 18–48)
MCH RBC QN AUTO: 28.9 PG (ref 27–31)
MCHC RBC AUTO-ENTMCNC: 31.9 G/DL (ref 32–36)
MCV RBC AUTO: 91 FL (ref 82–98)
MONOCYTES # BLD AUTO: 0.2 K/UL (ref 0.3–1)
MONOCYTES NFR BLD: 9.9 % (ref 4–15)
NEUTROPHILS # BLD AUTO: 1.1 K/UL (ref 1.8–7.7)
NEUTROPHILS NFR BLD: 52.9 % (ref 38–73)
NRBC BLD-RTO: 0 /100 WBC
PATH REV BLD -IMP: NORMAL
PLATELET # BLD AUTO: 69 K/UL (ref 150–450)
PLATELET BLD QL SMEAR: ABNORMAL
PMV BLD AUTO: 12.9 FL (ref 9.2–12.9)
PROT SERPL-MCNC: 7.5 G/DL (ref 6–8.4)
RBC # BLD AUTO: 3.56 M/UL (ref 4–5.4)
WBC # BLD AUTO: 2.02 K/UL (ref 3.9–12.7)

## 2022-06-17 LAB — PATHOLOGIST INTERPRETATION SPE: NORMAL

## 2022-06-20 DIAGNOSIS — R76.8 HEPATITIS A ANTIBODY POSITIVE: Primary | ICD-10-CM

## 2022-06-20 LAB
ANTI SM ANTIBODY: 0.78 RATIO (ref 0–0.99)
ANTI SM/RNP ANTIBODY: 0.7 RATIO (ref 0–0.99)
ANTI-SM INTERPRETATION: NEGATIVE
ANTI-SM/RNP INTERPRETATION: NEGATIVE
ANTI-SSA ANTIBODY: 0.79 RATIO (ref 0–0.99)
ANTI-SSA INTERPRETATION: NEGATIVE
ANTI-SSB ANTIBODY: 0.48 RATIO (ref 0–0.99)
ANTI-SSB INTERPRETATION: NEGATIVE
DSDNA AB SER-ACNC: NORMAL [IU]/ML

## 2022-06-20 NOTE — PROGRESS NOTES
6/20: Left message for patient to call me to discuss lab results. Referral has been entered for Hepatology; positive Hep B surface antigen.       6/21: Attempted to contact patient this morning, no answer on phone. No voicemail left today.

## 2022-06-30 ENCOUNTER — TELEPHONE (OUTPATIENT)
Dept: HEMATOLOGY/ONCOLOGY | Facility: CLINIC | Age: 47
End: 2022-06-30
Payer: MEDICAID

## 2022-06-30 NOTE — TELEPHONE ENCOUNTER
Left message to call the office to schedule/reschedule appt. Recall number provided.  ----- Message from Montse Herrera sent at 6/29/2022 10:32 AM CDT -----  Regarding: Dr Bailon  Type:Needs Medical Advice    Who Called:PT  Best call back number:[phone]  Additional Info: Requesting a call back regarding##PT needs to reschedule missed appt Monday. Please call to reschedule.  Please Advise- Thank you

## 2022-07-07 DIAGNOSIS — G89.4 CHRONIC PAIN SYNDROME: ICD-10-CM

## 2022-07-07 DIAGNOSIS — I10 ESSENTIAL HYPERTENSION: ICD-10-CM

## 2022-07-07 DIAGNOSIS — F41.9 ANXIETY: ICD-10-CM

## 2022-07-07 RX ORDER — LISINOPRIL 10 MG/1
10 TABLET ORAL DAILY
Qty: 30 TABLET | Refills: 0 | Status: SHIPPED | OUTPATIENT
Start: 2022-07-07 | End: 2022-08-03 | Stop reason: SDUPTHER

## 2022-07-07 RX ORDER — BISOPROLOL FUMARATE 5 MG/1
5 TABLET, FILM COATED ORAL DAILY
Qty: 30 TABLET | Refills: 0 | Status: SHIPPED | OUTPATIENT
Start: 2022-07-07 | End: 2022-10-13 | Stop reason: SDUPTHER

## 2022-07-07 RX ORDER — DIAZEPAM 10 MG/1
10 TABLET ORAL 3 TIMES DAILY
Qty: 90 TABLET | Refills: 0 | Status: SHIPPED | OUTPATIENT
Start: 2022-07-07 | End: 2022-08-03 | Stop reason: SDUPTHER

## 2022-07-07 RX ORDER — OXYCODONE HYDROCHLORIDE 15 MG/1
15 TABLET ORAL EVERY 6 HOURS PRN
Qty: 120 TABLET | Refills: 0 | Status: SHIPPED | OUTPATIENT
Start: 2022-07-07 | End: 2022-08-03 | Stop reason: SDUPTHER

## 2022-07-07 NOTE — TELEPHONE ENCOUNTER
----- Message from Cathy Mcghee MA sent at 7/7/2022 12:31 PM CDT -----  Regarding: refill  Patient needs refill on oxycodone, valium, lisinopril, refills on all heart medication  [facility] [place],  [phone]

## 2022-07-08 NOTE — TELEPHONE ENCOUNTER
The patient's prescription has been approved and sent to   [facility] - [place] - [address]  [address]  [place] [address]  Phone: [phone] Fax: [phone]

## 2022-07-12 ENCOUNTER — TELEPHONE (OUTPATIENT)
Dept: HEPATOLOGY | Facility: CLINIC | Age: 47
End: 2022-07-12
Payer: MEDICAID

## 2022-07-14 ENCOUNTER — TELEPHONE (OUTPATIENT)
Dept: HEMATOLOGY/ONCOLOGY | Facility: CLINIC | Age: 47
End: 2022-07-14
Payer: MEDICAID

## 2022-08-01 ENCOUNTER — TELEPHONE (OUTPATIENT)
Dept: FAMILY MEDICINE | Facility: CLINIC | Age: 47
End: 2022-08-01

## 2022-08-03 ENCOUNTER — OFFICE VISIT (OUTPATIENT)
Dept: FAMILY MEDICINE | Facility: CLINIC | Age: 47
End: 2022-08-03
Payer: MEDICAID

## 2022-08-03 VITALS
BODY MASS INDEX: 29.12 KG/M2 | HEART RATE: 120 BPM | SYSTOLIC BLOOD PRESSURE: 162 MMHG | HEIGHT: 71 IN | DIASTOLIC BLOOD PRESSURE: 102 MMHG | WEIGHT: 208 LBS

## 2022-08-03 DIAGNOSIS — M79.662 PAIN AND SWELLING OF LOWER LEG, LEFT: ICD-10-CM

## 2022-08-03 DIAGNOSIS — I10 ESSENTIAL HYPERTENSION: Primary | ICD-10-CM

## 2022-08-03 DIAGNOSIS — G89.4 CHRONIC PAIN SYNDROME: ICD-10-CM

## 2022-08-03 DIAGNOSIS — M79.89 PAIN AND SWELLING OF LOWER LEG, LEFT: ICD-10-CM

## 2022-08-03 DIAGNOSIS — F41.9 ANXIETY: ICD-10-CM

## 2022-08-03 PROCEDURE — 3066F NEPHROPATHY DOC TX: CPT | Mod: CPTII,S$GLB,, | Performed by: FAMILY MEDICINE

## 2022-08-03 PROCEDURE — 99214 PR OFFICE/OUTPT VISIT, EST, LEVL IV, 30-39 MIN: ICD-10-PCS | Mod: S$GLB,,, | Performed by: FAMILY MEDICINE

## 2022-08-03 PROCEDURE — 3080F PR MOST RECENT DIASTOLIC BLOOD PRESSURE >= 90 MM HG: ICD-10-PCS | Mod: CPTII,S$GLB,, | Performed by: FAMILY MEDICINE

## 2022-08-03 PROCEDURE — 3077F PR MOST RECENT SYSTOLIC BLOOD PRESSURE >= 140 MM HG: ICD-10-PCS | Mod: CPTII,S$GLB,, | Performed by: FAMILY MEDICINE

## 2022-08-03 PROCEDURE — 4010F ACE/ARB THERAPY RXD/TAKEN: CPT | Mod: CPTII,S$GLB,, | Performed by: FAMILY MEDICINE

## 2022-08-03 PROCEDURE — 4010F PR ACE/ARB THEARPY RXD/TAKEN: ICD-10-PCS | Mod: CPTII,S$GLB,, | Performed by: FAMILY MEDICINE

## 2022-08-03 PROCEDURE — 3008F PR BODY MASS INDEX (BMI) DOCUMENTED: ICD-10-PCS | Mod: CPTII,S$GLB,, | Performed by: FAMILY MEDICINE

## 2022-08-03 PROCEDURE — 3080F DIAST BP >= 90 MM HG: CPT | Mod: CPTII,S$GLB,, | Performed by: FAMILY MEDICINE

## 2022-08-03 PROCEDURE — 3061F NEG MICROALBUMINURIA REV: CPT | Mod: CPTII,S$GLB,, | Performed by: FAMILY MEDICINE

## 2022-08-03 PROCEDURE — 3008F BODY MASS INDEX DOCD: CPT | Mod: CPTII,S$GLB,, | Performed by: FAMILY MEDICINE

## 2022-08-03 PROCEDURE — 3077F SYST BP >= 140 MM HG: CPT | Mod: CPTII,S$GLB,, | Performed by: FAMILY MEDICINE

## 2022-08-03 PROCEDURE — 3066F PR DOCUMENTATION OF TREATMENT FOR NEPHROPATHY: ICD-10-PCS | Mod: CPTII,S$GLB,, | Performed by: FAMILY MEDICINE

## 2022-08-03 PROCEDURE — 99214 OFFICE O/P EST MOD 30 MIN: CPT | Mod: S$GLB,,, | Performed by: FAMILY MEDICINE

## 2022-08-03 PROCEDURE — 3061F PR NEG MICROALBUMINURIA RESULT DOCUMENTED/REVIEW: ICD-10-PCS | Mod: CPTII,S$GLB,, | Performed by: FAMILY MEDICINE

## 2022-08-03 RX ORDER — LISINOPRIL 10 MG/1
10 TABLET ORAL DAILY
Qty: 30 TABLET | Refills: 0 | Status: SHIPPED | OUTPATIENT
Start: 2022-08-03 | End: 2022-09-07 | Stop reason: SDUPTHER

## 2022-08-03 RX ORDER — OXYCODONE HYDROCHLORIDE 15 MG/1
15 TABLET ORAL EVERY 6 HOURS PRN
Qty: 120 TABLET | Refills: 0 | Status: SHIPPED | OUTPATIENT
Start: 2022-08-03 | End: 2022-09-07 | Stop reason: SDUPTHER

## 2022-08-03 RX ORDER — GABAPENTIN 300 MG/1
300 CAPSULE ORAL 2 TIMES DAILY
Qty: 60 CAPSULE | Refills: 2 | Status: SHIPPED | OUTPATIENT
Start: 2022-08-03 | End: 2022-10-03 | Stop reason: SDUPTHER

## 2022-08-03 RX ORDER — DIAZEPAM 10 MG/1
10 TABLET ORAL 3 TIMES DAILY
Qty: 90 TABLET | Refills: 0 | Status: SHIPPED | OUTPATIENT
Start: 2022-08-03 | End: 2022-09-07 | Stop reason: SDUPTHER

## 2022-08-03 RX ORDER — FUROSEMIDE 20 MG/1
20 TABLET ORAL DAILY
Qty: 30 TABLET | Refills: 0 | Status: ON HOLD | OUTPATIENT
Start: 2022-08-03 | End: 2022-08-25 | Stop reason: SDUPTHER

## 2022-08-03 NOTE — PROGRESS NOTES
SUBJECTIVE:    Patient ID: Marie Damon is a 46 y.o. female.    Chief Complaint: Edema (Bilateral ankles, stomach bloated, ER f/u, went over meds verbally// SW)    Pt seen in order to checkup on acute and chronic conditions.    BP is elevated today. Had not had BP meds.  Denies CP/SOB.   Weight stable.     Anxiety has been doing ok.     Pt has been to see GI. States she was told she had issues.  Taknig bentyl as needed.  Has not been having too many problems with stomach lately      States she has been to see a hematologist. Had some blood taken. Pt likely has Hep B and possibly cirrhosis.    Pt continues to have chronic back pain. Takes oxycodone as needed for pain.  Pt states she hasn't had her meds since a few days after she got them filled about a month ago. States she recently met some new people that were trying to get her to smoke meth, and she refused, and then she was pinned up in a room and had no choice but to inhale.     Pt has swelling in her feet, L>R.  Has not been able to walk on the left foot, has been hopping around.       No labs done.   Pt denies easy bruising or bleeding. Denies alcohol use.       Lab Visit on 06/15/2022   Component Date Value Ref Range Status    Pathologist Review 06/15/2022 Review completed   Final    Sodium 06/15/2022 139  136 - 145 mmol/L Final    Potassium 06/15/2022 3.5  3.5 - 5.1 mmol/L Final    Chloride 06/15/2022 107  95 - 110 mmol/L Final    CO2 06/15/2022 24  23 - 29 mmol/L Final    Glucose 06/15/2022 113 (H)  70 - 110 mg/dL Final    BUN 06/15/2022 15  6 - 20 mg/dL Final    Creatinine 06/15/2022 0.8  0.5 - 1.4 mg/dL Final    Calcium 06/15/2022 9.3  8.7 - 10.5 mg/dL Final    Total Protein 06/15/2022 8.0  6.0 - 8.4 g/dL Final    Albumin 06/15/2022 3.5  3.5 - 5.2 g/dL Final    Total Bilirubin 06/15/2022 1.1 (H)  0.1 - 1.0 mg/dL Final    Alkaline Phosphatase 06/15/2022 166 (H)  55 - 135 U/L Final    AST 06/15/2022 60 (H)  10 - 40 U/L Final    ALT 06/15/2022 90 (H)  10  - 44 U/L Final    Anion Gap 06/15/2022 8  8 - 16 mmol/L Final    eGFR if African American 06/15/2022 >60  >60 mL/min/1.73 m^2 Final    eGFR if non African American 06/15/2022 >60  >60 mL/min/1.73 m^2 Final    LD 06/15/2022 246  110 - 260 U/L Final    Iron 06/15/2022 64  30 - 160 ug/dL Final    Transferrin 06/15/2022 413 (H)  200 - 375 mg/dL Final    TIBC 06/15/2022 611 (H)  250 - 450 ug/dL Final    Saturated Iron 06/15/2022 10 (L)  20 - 50 % Final    Ferritin 06/15/2022 23  20.0 - 300.0 ng/mL Final    Folate 06/15/2022 10.7  4.0 - 24.0 ng/mL Final    Vitamin B-12 06/15/2022 992 (H)  210 - 950 pg/mL Final    Protein, Serum 06/15/2022 7.5  6.0 - 8.4 g/dL Final    Albumin 06/15/2022 3.62  3.35 - 5.55 g/dL Final    Alpha-1 06/15/2022 0.32  0.17 - 0.41 g/dL Final    Alpha-2 06/15/2022 0.56  0.43 - 0.99 g/dL Final    Beta 06/15/2022 0.95  0.50 - 1.10 g/dL Final    Gamma 06/15/2022 2.06 (H)  0.67 - 1.58 g/dL Final    HIV 1/2 Ag/Ab 06/15/2022 Negative  Negative Final    Hepatitis B Surface Ag 06/15/2022 Positive (A)  Negative Final    Hep B C IgM 06/15/2022 Grayzone (A)  Negative Final    Hep A IgM 06/15/2022 Negative  Negative Final    Hepatitis C Ab 06/15/2022 SEE COMMENT  Negative Final    Haptoglobin 06/15/2022 70  30 - 250 mg/dL Final    MICHAEL Screen 06/15/2022 Positive (A)  Negative <1:80 Final    Retic 06/15/2022 1.0  0.5 - 2.5 % Final    Sed Rate 06/15/2022 33  0 - 36 mm/Hr Final    CRP 06/15/2022 3.1  0.0 - 8.2 mg/L Final    WBC 06/15/2022 2.02 (L)  3.90 - 12.70 K/uL Final    RBC 06/15/2022 3.56 (L)  4.00 - 5.40 M/uL Final    Hemoglobin 06/15/2022 10.3 (L)  12.0 - 16.0 g/dL Final    Hematocrit 06/15/2022 32.3 (L)  37.0 - 48.5 % Final    MCV 06/15/2022 91  82 - 98 fL Final    MCH 06/15/2022 28.9  27.0 - 31.0 pg Final    MCHC 06/15/2022 31.9 (L)  32.0 - 36.0 g/dL Final    RDW 06/15/2022 15.6 (H)  11.5 - 14.5 % Final    Platelets 06/15/2022 69 (L)  150 - 450 K/uL Final    MPV 06/15/2022 12.9  9.2 - 12.9 fL Final     Immature Granulocytes 06/15/2022 0.0  0.0 - 0.5 % Final    Gran # (ANC) 06/15/2022 1.1 (L)  1.8 - 7.7 K/uL Final    Immature Grans (Abs) 06/15/2022 0.00  0.00 - 0.04 K/uL Final    Lymph # 06/15/2022 0.7 (L)  1.0 - 4.8 K/uL Final    Mono # 06/15/2022 0.2 (L)  0.3 - 1.0 K/uL Final    Eos # 06/15/2022 0.0  0.0 - 0.5 K/uL Final    Baso # 06/15/2022 0.02  0.00 - 0.20 K/uL Final    nRBC 06/15/2022 0  0 /100 WBC Final    Gran % 06/15/2022 52.9  38.0 - 73.0 % Final    Lymph % 06/15/2022 34.7  18.0 - 48.0 % Final    Mono % 06/15/2022 9.9  4.0 - 15.0 % Final    Eosinophil % 06/15/2022 1.5  0.0 - 8.0 % Final    Basophil % 06/15/2022 1.0  0.0 - 1.9 % Final    Platelet Estimate 06/15/2022 Decreased (A)   Final    Differential Method 06/15/2022 Automated   Final    Direct Charlie 06/15/2022 NEG   Final    MICHAEL PATTERN 1 06/15/2022 Homogeneous   Final    Anti Sm Antibody 06/15/2022 0.78  0.00 - 0.99 Ratio Final    Anti-Sm Interpretation 06/15/2022 Negative  Negative Final    Anti-SSA Antibody 06/15/2022 0.79  0.00 - 0.99 Ratio Final    Anti-SSA Interpretation 06/15/2022 Negative  Negative Final    Anti-SSB Antibody 06/15/2022 0.48  0.00 - 0.99 Ratio Final    Anti-SSB Interpretation 06/15/2022 Negative  Negative Final    ds DNA Ab 06/15/2022 Negative 1:10  Negative 1:10 Final    Anti Sm/RNP Antibody 06/15/2022 0.70  0.00 - 0.99 Ratio Final    Anti-Sm/RNP Interpretation 06/15/2022 Negative  Negative Final    MICHAEL Titer 1 06/15/2022 1:160   Final    HBsAg Confirmation 06/15/2022 Positive (A)  Negative Final    Pathologist Review Peripheral Smear 06/15/2022 REVIEWED   Final    Pathologist Interpretation SPE 06/15/2022 REVIEWED   Final   Admission on 06/03/2022, Discharged on 06/03/2022   Component Date Value Ref Range Status    WBC 06/03/2022 2.61 (L)  3.90 - 12.70 K/uL Final    RBC 06/03/2022 3.43 (L)  4.00 - 5.40 M/uL Final    Hemoglobin 06/03/2022 10.0 (L)  12.0 - 16.0 g/dL Final    Hematocrit 06/03/2022 29.8 (L)  37.0 - 48.5 % Final     MCV 06/03/2022 87  82 - 98 fL Final    MCH 06/03/2022 29.2  27.0 - 31.0 pg Final    MCHC 06/03/2022 33.6  32.0 - 36.0 g/dL Final    RDW 06/03/2022 15.9 (H)  11.5 - 14.5 % Final    Platelets 06/03/2022 64 (L)  150 - 450 K/uL Final    MPV 06/03/2022 13.3 (H)  9.2 - 12.9 fL Final    Immature Granulocytes 06/03/2022 0.4  0.0 - 0.5 % Final    Gran # (ANC) 06/03/2022 1.3 (L)  1.8 - 7.7 K/uL Final    Immature Grans (Abs) 06/03/2022 0.01  0.00 - 0.04 K/uL Final    Lymph # 06/03/2022 1.0  1.0 - 4.8 K/uL Final    Mono # 06/03/2022 0.2 (L)  0.3 - 1.0 K/uL Final    Eos # 06/03/2022 0.1  0.0 - 0.5 K/uL Final    Baso # 06/03/2022 0.03  0.00 - 0.20 K/uL Final    nRBC 06/03/2022 0  0 /100 WBC Final    Gran % 06/03/2022 48.7  38.0 - 73.0 % Final    Lymph % 06/03/2022 36.4  18.0 - 48.0 % Final    Mono % 06/03/2022 9.2  4.0 - 15.0 % Final    Eosinophil % 06/03/2022 4.2  0.0 - 8.0 % Final    Basophil % 06/03/2022 1.1  0.0 - 1.9 % Final    Platelet Estimate 06/03/2022 Decreased (A)   Final    Differential Method 06/03/2022 Automated   Final    Sodium 06/03/2022 140  136 - 145 mmol/L Final    Potassium 06/03/2022 3.8  3.5 - 5.1 mmol/L Final    Chloride 06/03/2022 112 (H)  95 - 110 mmol/L Final    CO2 06/03/2022 25  22 - 31 mmol/L Final    Glucose 06/03/2022 92  70 - 110 mg/dL Final    BUN 06/03/2022 10  7 - 18 mg/dL Final    Creatinine 06/03/2022 0.55  0.50 - 1.40 mg/dL Final    Calcium 06/03/2022 8.3 (L)  8.4 - 10.2 mg/dL Final    Total Protein 06/03/2022 7.5  6.0 - 8.4 g/dL Final    Albumin 06/03/2022 3.5  3.5 - 5.2 g/dL Final    Total Bilirubin 06/03/2022 0.8  0.2 - 1.3 mg/dL Final    Alkaline Phosphatase 06/03/2022 164 (H)  38 - 145 U/L Final    AST 06/03/2022 107 (H)  14 - 36 U/L Final    ALT 06/03/2022 109 (H)  0 - 35 U/L Final    Anion Gap 06/03/2022 3 (L)  8 - 16 mmol/L Final    eGFR if African American 06/03/2022 >60  >60 mL/min/1.73 m^2 Final    eGFR if non African American 06/03/2022 >60  >60 mL/min/1.73 m^2 Final     Specimen UA 06/03/2022 Urine, Clean Catch   Final    Color, UA 06/03/2022 Yellow  Yellow, Straw, Allyson Final    Appearance, UA 06/03/2022 Clear  Clear Final    pH, UA 06/03/2022 6.5  5.0 - 8.0 Final    Specific Gravity, UA 06/03/2022 1.010  1.005 - 1.030 Final    Protein, UA 06/03/2022 Negative  Negative Final    Glucose, UA 06/03/2022 Negative  Negative Final    Ketones, UA 06/03/2022 Negative  Negative Final    Bilirubin (UA) 06/03/2022 Negative  Negative Final    Occult Blood UA 06/03/2022 Trace (A)  Negative Final    Nitrite, UA 06/03/2022 Negative  Negative Final    Urobilinogen, UA 06/03/2022 0.2  <2.0 EU/dL Final    Leukocytes, UA 06/03/2022 Negative  Negative Final    Lipase Result 06/03/2022 182  23 - 300 U/L Final    Acetaminophen (Tylenol), Serum 06/03/2022 <10.0  10.0 - 20.0 ug/mL Final   Admission on 03/21/2022, Discharged on 03/21/2022   Component Date Value Ref Range Status    POC Rapid COVID 03/21/2022 Negative  Negative Final     Acceptable 03/21/2022 Yes   Final    Sodium 03/21/2022 141  136 - 145 mmol/L Final    Potassium 03/21/2022 2.8 (L)  3.5 - 5.1 mmol/L Final    Chloride 03/21/2022 103  95 - 110 mmol/L Final    CO2 03/21/2022 26  23 - 29 mmol/L Final    Glucose 03/21/2022 124 (H)  70 - 110 mg/dL Final    BUN 03/21/2022 8  6 - 20 mg/dL Final    Creatinine 03/21/2022 0.7  0.5 - 1.4 mg/dL Final    Calcium 03/21/2022 8.8  8.7 - 10.5 mg/dL Final    Total Protein 03/21/2022 7.3  6.0 - 8.4 g/dL Final    Albumin 03/21/2022 2.7 (L)  3.5 - 5.2 g/dL Final    Total Bilirubin 03/21/2022 0.9  0.1 - 1.0 mg/dL Final    Alkaline Phosphatase 03/21/2022 246 (H)  55 - 135 U/L Final    AST 03/21/2022 39  10 - 40 U/L Final    ALT 03/21/2022 18  10 - 44 U/L Final    Anion Gap 03/21/2022 12  8 - 16 mmol/L Final    eGFR if African American 03/21/2022 >60  >60 mL/min/1.73 m^2 Final    eGFR if non African American 03/21/2022 >60  >60 mL/min/1.73 m^2 Final    WBC 03/21/2022 3.37 (L)  3.90 - 12.70 K/uL  Final    RBC 03/21/2022 3.67 (L)  4.00 - 5.40 M/uL Final    Hemoglobin 03/21/2022 9.9 (L)  12.0 - 16.0 g/dL Final    Hematocrit 03/21/2022 30.8 (L)  37.0 - 48.5 % Final    MCV 03/21/2022 84  82 - 98 fL Final    MCH 03/21/2022 27.0  27.0 - 31.0 pg Final    MCHC 03/21/2022 32.1  32.0 - 36.0 g/dL Final    RDW 03/21/2022 13.5  11.5 - 14.5 % Final    Platelets 03/21/2022 136 (L)  150 - 450 K/uL Final    MPV 03/21/2022 9.8  9.2 - 12.9 fL Final    Immature Granulocytes 03/21/2022 0.3  0.0 - 0.5 % Final    Gran # (ANC) 03/21/2022 2.6  1.8 - 7.7 K/uL Final    Immature Grans (Abs) 03/21/2022 0.01  0.00 - 0.04 K/uL Final    Lymph # 03/21/2022 0.6 (L)  1.0 - 4.8 K/uL Final    Mono # 03/21/2022 0.1 (L)  0.3 - 1.0 K/uL Final    Eos # 03/21/2022 0.0  0.0 - 0.5 K/uL Final    Baso # 03/21/2022 0.01  0.00 - 0.20 K/uL Final    nRBC 03/21/2022 0  0 /100 WBC Final    Gran % 03/21/2022 77.4 (H)  38.0 - 73.0 % Final    Lymph % 03/21/2022 18.4  18.0 - 48.0 % Final    Mono % 03/21/2022 3.6 (L)  4.0 - 15.0 % Final    Eosinophil % 03/21/2022 0.0  0.0 - 8.0 % Final    Basophil % 03/21/2022 0.3  0.0 - 1.9 % Final    Differential Method 03/21/2022 Automated   Final    Troponin I 03/21/2022 0.020  0.000 - 0.026 ng/mL Final    BNP 03/21/2022 130 (H)  0 - 99 pg/mL Final    Magnesium 03/21/2022 1.9  1.6 - 2.6 mg/dL Final    POC Molecular Influenza A Ag 03/21/2022 Negative  Negative, Not Reported Final    POC Molecular Influenza B Ag 03/21/2022 Negative  Negative, Not Reported Final     Acceptable 03/21/2022 Yes   Final       Past Medical History:   Diagnosis Date    Bone spur     hip    HTN (hypertension)      Social History     Socioeconomic History    Marital status:    Tobacco Use    Smoking status: Every Day     Packs/day: 1.00     Types: Cigarettes    Smokeless tobacco: Never   Substance and Sexual Activity    Alcohol use: Not Currently    Drug use: Not Currently     Past Surgical History:   Procedure Laterality Date     CHOLECYSTECTOMY      HYSTERECTOMY       History reviewed. No pertinent family history.    Review of patient's allergies indicates:   Allergen Reactions    Pcn [penicillins] Hives    Morphine Other (See Comments)     heartburn    Bupropion hcl Other (See Comments)    Demerol [meperidine]        Current Outpatient Medications:     bisoprolol (ZEBETA) 5 MG tablet, Take 1 tablet (5 mg total) by mouth once daily., Disp: 30 tablet, Rfl: 0    naloxone (NARCAN) 4 mg/actuation Spry, each nostril if not breathing or not responsive, Disp: 1 each, Rfl: 2    ondansetron (ZOFRAN-ODT) 4 MG TbDL, Take 1 tablet (4 mg total) by mouth every 8 (eight) hours as needed (nausea). (Patient not taking: Reported on 8/23/2022), Disp: 20 tablet, Rfl: 0    diazePAM (VALIUM) 10 MG Tab, Take 1 tablet (10 mg total) by mouth 3 (three) times daily., Disp: 90 tablet, Rfl: 0    diclofenac sodium (VOLTAREN) 1 % Gel, Apply 2 g topically 4 (four) times daily., Disp: 100 g, Rfl: 0    furosemide (LASIX) 40 MG tablet, Take 1 tablet (40 mg total) by mouth once daily., Disp: 30 tablet, Rfl: 1    gabapentin (NEURONTIN) 300 MG capsule, Take 1 capsule (300 mg total) by mouth 2 (two) times daily., Disp: 60 capsule, Rfl: 2    LIDOcaine (LIDODERM) 5 %, Place 1 patch onto the skin once daily. Remove & Discard patch within 12 hours or as directed by MD, Disp: 30 patch, Rfl: 0    lisinopriL 10 MG tablet, Take 1 tablet (10 mg total) by mouth once daily., Disp: 30 tablet, Rfl: 0    oxyCODONE (ROXICODONE) 15 MG Tab, Take 1 tablet (15 mg total) by mouth every 6 (six) hours as needed for Pain., Disp: 120 tablet, Rfl: 0    senna (SENOKOT) 8.6 mg tablet, Take 1 tablet by mouth daily as needed for Constipation., Disp: 30 tablet, Rfl: 0    spironolactone (ALDACTONE) 100 MG tablet, Take 1 tablet (100 mg total) by mouth once daily., Disp: 30 tablet, Rfl: 1    Review of Systems   Constitutional:  Negative for appetite change, fatigue, fever and unexpected weight change.  "  Respiratory:  Negative for cough, chest tightness, shortness of breath and wheezing.    Cardiovascular:  Negative for chest pain and leg swelling.   Gastrointestinal:  Negative for abdominal pain, constipation, nausea and vomiting.        -heartburn, bloating   Genitourinary:  Negative for difficulty urinating, dysuria, frequency and urgency.   Musculoskeletal:  Positive for arthralgias and back pain. Negative for myalgias and neck pain.   Skin:  Negative for rash.   Neurological:  Negative for dizziness, weakness, numbness and headaches.   Hematological:  Does not bruise/bleed easily.   Psychiatric/Behavioral:  Negative for dysphoric mood, sleep disturbance and suicidal ideas. The patient is nervous/anxious.    All other systems reviewed and are negative.       Objective:      Vitals:    08/03/22 1133 08/03/22 1137   BP: (!) 160/100 (!) 162/102   Pulse: (!) 120    Weight: 94.3 kg (208 lb)    Height: 5' 11" (1.803 m)      Physical Exam  Vitals reviewed.   Constitutional:       General: She is not in acute distress.     Appearance: Normal appearance. She is well-developed.      Comments: overweight   HENT:      Head: Normocephalic and atraumatic.   Neck:      Thyroid: No thyromegaly.   Cardiovascular:      Rate and Rhythm: Normal rate and regular rhythm.      Heart sounds: Normal heart sounds. No murmur heard.    No friction rub.   Pulmonary:      Effort: Pulmonary effort is normal.      Breath sounds: Normal breath sounds. No wheezing or rales.   Abdominal:      General: There is no distension.      Palpations: Abdomen is soft.      Tenderness: There is no abdominal tenderness. There is no guarding or rebound.   Musculoskeletal:      Cervical back: Neck supple.   Lymphadenopathy:      Cervical: No cervical adenopathy.   Skin:     General: Skin is warm and dry.      Findings: No rash.   Neurological:      Mental Status: She is alert and oriented to person, place, and time.   Psychiatric:         Attention and " Perception: She is attentive.         Speech: Speech normal.         Behavior: Behavior normal.         Thought Content: Thought content normal.         Judgment: Judgment normal.         Assessment:       1. Essential hypertension    2. Chronic pain syndrome    3. Anxiety    4. Pain and swelling of lower leg, left         Plan:       Essential hypertension  Comments:  Pt to hydrate and to resume BP med.  Orders:  -     lisinopriL 10 MG tablet; Take 1 tablet (10 mg total) by mouth once daily.  Dispense: 30 tablet; Refill: 0    Chronic pain syndrome  Comments:  Pain controlled. Will continue to monitor the pain control and function  Orders:  -     gabapentin (NEURONTIN) 300 MG capsule; Take 1 capsule (300 mg total) by mouth 2 (two) times daily.  Dispense: 60 capsule; Refill: 2  -     oxyCODONE (ROXICODONE) 15 MG Tab; Take 1 tablet (15 mg total) by mouth every 6 (six) hours as needed for Pain.  Dispense: 120 tablet; Refill: 0    Anxiety  Comments:  Controlled. Will continue to monitor symptoms  Orders:  -     diazePAM (VALIUM) 10 MG Tab; Take 1 tablet (10 mg total) by mouth 3 (three) times daily.  Dispense: 90 tablet; Refill: 0    Pain and swelling of lower leg, left  Comments:  Will order Xray, due to trauma  Orders:  -     X-Ray Foot Complete 3 view Left; Future; Expected date: 08/03/2022  -     Discontinue: furosemide (LASIX) 20 MG tablet; Take 1 tablet (20 mg total) by mouth once daily. (Patient not taking: Reported on 8/23/2022)  Dispense: 30 tablet; Refill: 0    Labs and/or tests have been ordered for the evaluation/monitoring of acute/chronic conditions, to be done just before next visit.    Chronic Pain Notes:  Have discussed the risks and benefits of chronic narcotic therapy including pain control, dependence, and addiction.  The patient is aware and accepts the risks and benefits. Patient is aware of the risk of taking narcotics combined with benzodiazepines/muscle relaxers/hypnotics, including but not  limited to breathing difficulties, coma, and death; accepts the risks; and agrees to use medications only as prescribed.    Follow up in about 3 months (around 11/3/2022) for HTN, Anxiety.        8/30/2022 Marleen Sun

## 2022-08-09 ENCOUNTER — TELEPHONE (OUTPATIENT)
Dept: HEMATOLOGY/ONCOLOGY | Facility: CLINIC | Age: 47
End: 2022-08-09
Payer: MEDICAID

## 2022-08-09 NOTE — TELEPHONE ENCOUNTER
"Spoke with patient regarding seeing Dr. Bailon. Patient reported that she was unable to make previous appointments due to identity theft. Instructed patient to proceed to the emergency room if she feels her legs are "going to explode." Patient denied pain as well as previously reported symptoms such as redness and stated that she could wait to see Dr. Bailon. Patient verbalized agreement and appreciation for appointment. ----- Message from Lucrecia Carrera sent at 8/9/2022  4:06 PM CDT -----  Type: Needs Medical Advice  Who Called:  Patient   Symptoms (please be specific):  both legs are swollen/top part of left foot is red and looks like its about to bursts open   How long has patient had these symptoms: couple of mths.  Pharmacy name and phone #:    Best Call Back Number: [phone]  Additional Information: Patient is requesting a call back from the nurse.         "

## 2022-08-11 LAB
HAV IGM SERPL QL IA: NEGATIVE
HBV CORE IGM SERPL QL IA: ABNORMAL
HBV SURFACE AG SERPL QL IA: POSITIVE
HCV AB SERPL QL IA: ABNORMAL

## 2022-08-18 ENCOUNTER — TELEPHONE (OUTPATIENT)
Dept: FAMILY MEDICINE | Facility: CLINIC | Age: 47
End: 2022-08-18

## 2022-08-18 NOTE — TELEPHONE ENCOUNTER
Pt no showed last appointment. Please call to reschedule. Please remind pt that they are at risk for being discharged.

## 2022-08-19 ENCOUNTER — TELEPHONE (OUTPATIENT)
Dept: HEMATOLOGY/ONCOLOGY | Facility: CLINIC | Age: 47
End: 2022-08-19
Payer: MEDICAID

## 2022-08-19 NOTE — TELEPHONE ENCOUNTER
Spoke with the pt mother about rescheduling the vv appt to person visit on 08/23. Pt mother stated that pt legs, thighs, and feet are swollen. Pt verbalized and understanding.  ----- Message from Montse Herrera sent at 8/19/2022  1:23 PM CDT -----  Regarding: Dr Bailon  Type:Needs Medical Advice    Who Called:Armin Cerna (Mother)  Best call back number:[phone]  Additional Info: Requesting a call back regarding#PT needs to be seen in person, her legs and feet are swollen severely. PT does not have a good phone nor does she know how to do the virtual appt. Please change to an in person appt.  Please Advise- Thank you    ## the number above is the only working phone in the house

## 2022-08-22 ENCOUNTER — TELEPHONE (OUTPATIENT)
Dept: HEMATOLOGY/ONCOLOGY | Facility: CLINIC | Age: 47
End: 2022-08-22
Payer: MEDICAID

## 2022-08-23 ENCOUNTER — OFFICE VISIT (OUTPATIENT)
Dept: HEMATOLOGY/ONCOLOGY | Facility: CLINIC | Age: 47
End: 2022-08-23
Payer: MEDICAID

## 2022-08-23 ENCOUNTER — LAB VISIT (OUTPATIENT)
Dept: LAB | Facility: HOSPITAL | Age: 47
End: 2022-08-23
Attending: STUDENT IN AN ORGANIZED HEALTH CARE EDUCATION/TRAINING PROGRAM
Payer: MEDICAID

## 2022-08-23 VITALS
BODY MASS INDEX: 31.45 KG/M2 | TEMPERATURE: 98 F | SYSTOLIC BLOOD PRESSURE: 138 MMHG | OXYGEN SATURATION: 98 % | WEIGHT: 224.63 LBS | HEIGHT: 71 IN | HEART RATE: 104 BPM | DIASTOLIC BLOOD PRESSURE: 84 MMHG | RESPIRATION RATE: 16 BRPM

## 2022-08-23 DIAGNOSIS — K74.60 HEPATIC CIRRHOSIS, UNSPECIFIED HEPATIC CIRRHOSIS TYPE, UNSPECIFIED WHETHER ASCITES PRESENT: ICD-10-CM

## 2022-08-23 DIAGNOSIS — R60.1 ANASARCA: ICD-10-CM

## 2022-08-23 DIAGNOSIS — R76.8 HEPATITIS A ANTIBODY POSITIVE: Primary | ICD-10-CM

## 2022-08-23 DIAGNOSIS — R76.8 HEPATITIS B SURFACE ANTIGEN POSITIVE: ICD-10-CM

## 2022-08-23 DIAGNOSIS — D61.818 PANCYTOPENIA: ICD-10-CM

## 2022-08-23 DIAGNOSIS — R76.8 HEPATITIS A ANTIBODY POSITIVE: ICD-10-CM

## 2022-08-23 PROBLEM — B18.1 CHRONIC HEPATITIS B: Status: ACTIVE | Noted: 2022-08-23

## 2022-08-23 PROBLEM — R18.8 ASCITES: Status: ACTIVE | Noted: 2022-08-23

## 2022-08-23 PROBLEM — K74.69 OTHER CIRRHOSIS OF LIVER: Status: ACTIVE | Noted: 2022-08-23

## 2022-08-23 PROBLEM — E87.6 HYPOKALEMIA: Status: ACTIVE | Noted: 2022-08-23

## 2022-08-23 LAB
ALBUMIN SERPL BCP-MCNC: 2.7 G/DL (ref 3.5–5.2)
ALP SERPL-CCNC: 170 U/L (ref 55–135)
ALT SERPL W/O P-5'-P-CCNC: 49 U/L (ref 10–44)
ANION GAP SERPL CALC-SCNC: 8 MMOL/L (ref 8–16)
AST SERPL-CCNC: 58 U/L (ref 10–40)
BASOPHILS # BLD AUTO: 0.01 K/UL (ref 0–0.2)
BASOPHILS NFR BLD: 0.4 % (ref 0–1.9)
BILIRUB SERPL-MCNC: 0.9 MG/DL (ref 0.1–1)
BUN SERPL-MCNC: 12 MG/DL (ref 6–20)
CALCIUM SERPL-MCNC: 8.2 MG/DL (ref 8.7–10.5)
CHLORIDE SERPL-SCNC: 109 MMOL/L (ref 95–110)
CO2 SERPL-SCNC: 23 MMOL/L (ref 23–29)
CREAT SERPL-MCNC: 0.8 MG/DL (ref 0.5–1.4)
DIFFERENTIAL METHOD: ABNORMAL
EOSINOPHIL # BLD AUTO: 0.1 K/UL (ref 0–0.5)
EOSINOPHIL NFR BLD: 3.9 % (ref 0–8)
ERYTHROCYTE [DISTWIDTH] IN BLOOD BY AUTOMATED COUNT: 14.9 % (ref 11.5–14.5)
EST. GFR  (NO RACE VARIABLE): >60 ML/MIN/1.73 M^2
GLUCOSE SERPL-MCNC: 90 MG/DL (ref 70–110)
HCT VFR BLD AUTO: 30.1 % (ref 37–48.5)
HGB BLD-MCNC: 9.6 G/DL (ref 12–16)
IMM GRANULOCYTES # BLD AUTO: 0 K/UL (ref 0–0.04)
IMM GRANULOCYTES NFR BLD AUTO: 0 % (ref 0–0.5)
INR PPP: 1.2 (ref 0.8–1.2)
LYMPHOCYTES # BLD AUTO: 0.7 K/UL (ref 1–4.8)
LYMPHOCYTES NFR BLD: 24.3 % (ref 18–48)
MCH RBC QN AUTO: 29.5 PG (ref 27–31)
MCHC RBC AUTO-ENTMCNC: 31.9 G/DL (ref 32–36)
MCV RBC AUTO: 93 FL (ref 82–98)
MONOCYTES # BLD AUTO: 0.3 K/UL (ref 0.3–1)
MONOCYTES NFR BLD: 11.4 % (ref 4–15)
NEUTROPHILS # BLD AUTO: 1.7 K/UL (ref 1.8–7.7)
NEUTROPHILS NFR BLD: 60 % (ref 38–73)
NRBC BLD-RTO: 0 /100 WBC
PLATELET # BLD AUTO: 60 K/UL (ref 150–450)
PMV BLD AUTO: 12.5 FL (ref 9.2–12.9)
POTASSIUM SERPL-SCNC: 3.1 MMOL/L (ref 3.5–5.1)
PROT SERPL-MCNC: 6 G/DL (ref 6–8.4)
PROTHROMBIN TIME: 11.9 SEC (ref 9–12.5)
RBC # BLD AUTO: 3.25 M/UL (ref 4–5.4)
SODIUM SERPL-SCNC: 140 MMOL/L (ref 136–145)
WBC # BLD AUTO: 2.8 K/UL (ref 3.9–12.7)

## 2022-08-23 PROCEDURE — 3008F BODY MASS INDEX DOCD: CPT | Mod: CPTII,,, | Performed by: STUDENT IN AN ORGANIZED HEALTH CARE EDUCATION/TRAINING PROGRAM

## 2022-08-23 PROCEDURE — 1159F MED LIST DOCD IN RCRD: CPT | Mod: CPTII,,, | Performed by: STUDENT IN AN ORGANIZED HEALTH CARE EDUCATION/TRAINING PROGRAM

## 2022-08-23 PROCEDURE — 99215 PR OFFICE/OUTPT VISIT, EST, LEVL V, 40-54 MIN: ICD-10-PCS | Mod: S$PBB,,, | Performed by: STUDENT IN AN ORGANIZED HEALTH CARE EDUCATION/TRAINING PROGRAM

## 2022-08-23 PROCEDURE — 85610 PROTHROMBIN TIME: CPT | Performed by: STUDENT IN AN ORGANIZED HEALTH CARE EDUCATION/TRAINING PROGRAM

## 2022-08-23 PROCEDURE — 3066F PR DOCUMENTATION OF TREATMENT FOR NEPHROPATHY: ICD-10-PCS | Mod: CPTII,,, | Performed by: STUDENT IN AN ORGANIZED HEALTH CARE EDUCATION/TRAINING PROGRAM

## 2022-08-23 PROCEDURE — 4010F PR ACE/ARB THEARPY RXD/TAKEN: ICD-10-PCS | Mod: CPTII,,, | Performed by: STUDENT IN AN ORGANIZED HEALTH CARE EDUCATION/TRAINING PROGRAM

## 2022-08-23 PROCEDURE — 99999 PR PBB SHADOW E&M-EST. PATIENT-LVL IV: CPT | Mod: PBBFAC,,, | Performed by: STUDENT IN AN ORGANIZED HEALTH CARE EDUCATION/TRAINING PROGRAM

## 2022-08-23 PROCEDURE — 1159F PR MEDICATION LIST DOCUMENTED IN MEDICAL RECORD: ICD-10-PCS | Mod: CPTII,,, | Performed by: STUDENT IN AN ORGANIZED HEALTH CARE EDUCATION/TRAINING PROGRAM

## 2022-08-23 PROCEDURE — 3066F NEPHROPATHY DOC TX: CPT | Mod: CPTII,,, | Performed by: STUDENT IN AN ORGANIZED HEALTH CARE EDUCATION/TRAINING PROGRAM

## 2022-08-23 PROCEDURE — 3061F PR NEG MICROALBUMINURIA RESULT DOCUMENTED/REVIEW: ICD-10-PCS | Mod: CPTII,,, | Performed by: STUDENT IN AN ORGANIZED HEALTH CARE EDUCATION/TRAINING PROGRAM

## 2022-08-23 PROCEDURE — 87517 HEPATITIS B DNA QUANT: CPT | Performed by: STUDENT IN AN ORGANIZED HEALTH CARE EDUCATION/TRAINING PROGRAM

## 2022-08-23 PROCEDURE — 3075F PR MOST RECENT SYSTOLIC BLOOD PRESS GE 130-139MM HG: ICD-10-PCS | Mod: CPTII,,, | Performed by: STUDENT IN AN ORGANIZED HEALTH CARE EDUCATION/TRAINING PROGRAM

## 2022-08-23 PROCEDURE — 99214 OFFICE O/P EST MOD 30 MIN: CPT | Mod: PBBFAC,PN | Performed by: STUDENT IN AN ORGANIZED HEALTH CARE EDUCATION/TRAINING PROGRAM

## 2022-08-23 PROCEDURE — 1160F RVW MEDS BY RX/DR IN RCRD: CPT | Mod: CPTII,,, | Performed by: STUDENT IN AN ORGANIZED HEALTH CARE EDUCATION/TRAINING PROGRAM

## 2022-08-23 PROCEDURE — 3079F PR MOST RECENT DIASTOLIC BLOOD PRESSURE 80-89 MM HG: ICD-10-PCS | Mod: CPTII,,, | Performed by: STUDENT IN AN ORGANIZED HEALTH CARE EDUCATION/TRAINING PROGRAM

## 2022-08-23 PROCEDURE — 99999 PR PBB SHADOW E&M-EST. PATIENT-LVL IV: ICD-10-PCS | Mod: PBBFAC,,, | Performed by: STUDENT IN AN ORGANIZED HEALTH CARE EDUCATION/TRAINING PROGRAM

## 2022-08-23 PROCEDURE — 36415 COLL VENOUS BLD VENIPUNCTURE: CPT | Mod: PN | Performed by: STUDENT IN AN ORGANIZED HEALTH CARE EDUCATION/TRAINING PROGRAM

## 2022-08-23 PROCEDURE — 85025 COMPLETE CBC W/AUTO DIFF WBC: CPT | Mod: 91,PN | Performed by: STUDENT IN AN ORGANIZED HEALTH CARE EDUCATION/TRAINING PROGRAM

## 2022-08-23 PROCEDURE — 3079F DIAST BP 80-89 MM HG: CPT | Mod: CPTII,,, | Performed by: STUDENT IN AN ORGANIZED HEALTH CARE EDUCATION/TRAINING PROGRAM

## 2022-08-23 PROCEDURE — 4010F ACE/ARB THERAPY RXD/TAKEN: CPT | Mod: CPTII,,, | Performed by: STUDENT IN AN ORGANIZED HEALTH CARE EDUCATION/TRAINING PROGRAM

## 2022-08-23 PROCEDURE — 1160F PR REVIEW ALL MEDS BY PRESCRIBER/CLIN PHARMACIST DOCUMENTED: ICD-10-PCS | Mod: CPTII,,, | Performed by: STUDENT IN AN ORGANIZED HEALTH CARE EDUCATION/TRAINING PROGRAM

## 2022-08-23 PROCEDURE — 3008F PR BODY MASS INDEX (BMI) DOCUMENTED: ICD-10-PCS | Mod: CPTII,,, | Performed by: STUDENT IN AN ORGANIZED HEALTH CARE EDUCATION/TRAINING PROGRAM

## 2022-08-23 PROCEDURE — 99215 OFFICE O/P EST HI 40 MIN: CPT | Mod: S$PBB,,, | Performed by: STUDENT IN AN ORGANIZED HEALTH CARE EDUCATION/TRAINING PROGRAM

## 2022-08-23 PROCEDURE — 3075F SYST BP GE 130 - 139MM HG: CPT | Mod: CPTII,,, | Performed by: STUDENT IN AN ORGANIZED HEALTH CARE EDUCATION/TRAINING PROGRAM

## 2022-08-23 PROCEDURE — 3061F NEG MICROALBUMINURIA REV: CPT | Mod: CPTII,,, | Performed by: STUDENT IN AN ORGANIZED HEALTH CARE EDUCATION/TRAINING PROGRAM

## 2022-08-23 PROCEDURE — 80053 COMPREHEN METABOLIC PANEL: CPT | Mod: 91,PN | Performed by: STUDENT IN AN ORGANIZED HEALTH CARE EDUCATION/TRAINING PROGRAM

## 2022-08-23 PROCEDURE — 87522 HEPATITIS C REVRS TRNSCRPJ: CPT | Performed by: STUDENT IN AN ORGANIZED HEALTH CARE EDUCATION/TRAINING PROGRAM

## 2022-08-23 NOTE — PROGRESS NOTES
"PATIENT: Marie Damon  MRN: [id number]  DATE: 8/24/2022      Diagnosis:   1. Hepatitis A antibody positive    2. Hepatitis B surface antigen positive    3. Hepatic cirrhosis, unspecified hepatic cirrhosis type, unspecified whether ascites present    4. Pancytopenia    5. Anasarca        Chief Complaint: Follow-up      Subjective:   HPI: Ms. Damon is a 46 y.o. female with HTN, anxiety, DDD, chronic low back pain who is seen today at the request of MAYRA Slaughter for a diagnosis of thrombocytopenia.     Today, patient had multiple blood work in 6/2022 with significant findings of Hep B s antigen positive, positive MICHAEL, and thrombocytopenia. She presented with significant swelling of her lower ext bilateral as well as distension of her abdomen. Patient was referred to see hepatology; attempted to schedule patient for an alex on 7/12/22 with Dr Mason was not successful since patient phone got hacked " per patient". Patient wanted "something done" in the clinic, explain to her that she needs to see a hepatology and she needs lasix with possible paracentesis and the fastest way to get this done is to go to the ER. Patient state that lasix made her "sleepy".     Denies any SOB, fever or chills, diarrhea, urinary track infection, blood in the stool or urine.       Hematological history:   6/3/22 she presented to the ER at [facility] for abdominal pain, nausea, and diarrhea.   CT A/P was done with the following findings: "The liver slightly decreased size and irregular contour indicating cirrhosis.  No discrete focal lesion.  Gallbladder removed.  Pancreas normal.  Stomach decompressed.  Spleen is severely enlarged measuring 17.5 cm across.  Numerous periportal, splenic hilar, mesenteric, omental collateral vessels.  Generalized hazy density throughout the mesentery more significant centrally.  Adrenal glands normal.  Kidneys normal size and contour with no hydronephrosis.  Aorta tapers normally.  No bowel obstruction, " "ascites, or significant lymphadenopathy seen.  Bladder and rectum normal.  Bones intact.  Lung bases clear."  Lab results show patient was anemic with Hgb 10.g/dL, low WBC at 2.61 and platelet count of 64k. Elevated Alk/Phos, AST, ALT. She was discharged with follow up referrals to GI and this office.   Was seen by GI at [facility] yesterday and was given Rx for Metronidazole that she has not yet started. No additional blood work was done at that visit. She is scheduled to go back in 2 weeks for follow up of results of stool studies.   No family history of hematologic cancers or blood disorders. Mother had thyroid cancer.   Personal history of cholecystectomy and hysterectomy.   She has not experienced recurrent infections or prolonged use of antibiotic.   Social history includes 33 pack year smoking history, minimal alcohol use. 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 She states her drug use last for a few years, she has not used illicit drugs in approximately 15 years. Denies high risk sexual activity.    Past Medical History:   Past Medical History:   Diagnosis Date    Bone spur     hip    HTN (hypertension)        Past Surgical HIstory:   Past Surgical History:   Procedure Laterality Date    CHOLECYSTECTOMY      HYSTERECTOMY         Family History: No family history on file.    Social History:  reports that she has been smoking cigarettes. She has been smoking about 1.00 pack per day. She has never used smokeless tobacco. She reports previous alcohol use. She reports previous drug use.    Allergies:  Review of patient's allergies indicates:   Allergen Reactions    Pcn [penicillins] Hives    Morphine Other (See Comments)     heartburn    Bupropion hcl Other (See Comments)    Demerol [meperidine]        Medications:  No current facility-administered medications for this visit.     No current outpatient medications on file.     Facility-Administered Medications " Ordered in Other Visits   Medication Dose Route Frequency Provider Last Rate Last Admin    diazePAM tablet 10 mg  10 mg Oral TID PRN Jacek Roblero MD        famotidine tablet 20 mg  20 mg Oral BID Jacek Roblero MD   20 mg at 08/23/22 2017    furosemide injection 40 mg  40 mg Intravenous BID WAKE Jacek Roblero MD        melatonin tablet 6 mg  6 mg Oral Nightly PRN Jacek Roblero MD        metoprolol tartrate (LOPRESSOR) tablet 50 mg  50 mg Oral BID Jacek Roblero MD   50 mg at 08/23/22 2017    morphine injection 2 mg  2 mg Intravenous ED 1 Time Saji Sam MD        ondansetron injection 4 mg  4 mg Intravenous Q8H PRN Jacek Roblero MD        oxyCODONE immediate release tablet 15 mg  15 mg Oral Q6H PRN Jacek Roblero MD   15 mg at 08/23/22 1901    prochlorperazine injection Soln 5 mg  5 mg Intravenous Q6H PRN Jacek Roblero MD        sodium chloride 0.9% flush 10 mL  10 mL Intravenous PRN Jacek Roblero MD        spironolactone tablet 50 mg  50 mg Oral Daily Jacek Roblero MD   50 mg at 08/23/22 1937       Review of Systems   Constitutional: Positive for activity change, appetite change and fatigue. Negative for chills and fever.   HENT: Negative for nosebleeds and trouble swallowing.    Respiratory: Negative for cough and shortness of breath.    Cardiovascular: Negative for chest pain, palpitations and leg swelling.   Gastrointestinal: Positive for abdominal pain, diarrhea and nausea. Negative for abdominal distention, constipation and vomiting.   Genitourinary: Negative for dysuria and hematuria.   Musculoskeletal: Positive for back pain.   Skin: Negative for pallor and rash.   Neurological: Negative for light-headedness and headaches.   Hematological: Negative for adenopathy. Does not bruise/bleed easily.   Psychiatric/Behavioral: The patient is nervous/anxious.          Objective:      Vitals:   Vitals:    08/23/22 0949   BP: 138/84   BP Location: Right arm   Patient Position: Sitting   BP Method: Large  "(Automatic)   Pulse: 104   Resp: 16   Temp: 97.7 °F (36.5 °C)   TempSrc: Temporal   SpO2: 98%   Weight: 101.9 kg (224 lb 10.4 oz)   Height: 5' 11" (1.803 m)     BMI: Body mass index is 31.33 kg/m².    Physical Exam  Vitals reviewed.   Constitutional:       General: She is not in acute distress.     Appearance: She is not diaphoretic.   HENT:      Head: Normocephalic and atraumatic.      Mouth/Throat:      Mouth: Mucous membranes are moist.   Eyes:      General: No scleral icterus.  Cardiovascular:      Rate and Rhythm: Normal rate and regular rhythm.      Pulses: Normal pulses.      Heart sounds: Normal heart sounds. No murmur heard.  Pulmonary:      Effort: Pulmonary effort is normal. No respiratory distress.      Breath sounds: Normal breath sounds. No wheezing or rhonchi.   Abdominal:      General: Bowel sounds are normal. There is distension.      Palpations: Abdomen is soft. There is no mass.      Tenderness: There is no abdominal tenderness. There is no guarding or rebound.   Musculoskeletal:         General: Swelling and tenderness present.      Cervical back: No tenderness.   Lymphadenopathy:      Cervical: No cervical adenopathy.   Skin:     General: Skin is dry.      Coloration: Skin is not jaundiced.      Findings: No bruising or rash.   Neurological:      Mental Status: She is alert and oriented to person, place, and time.      Gait: Gait normal.   Psychiatric:         Mood and Affect: Mood normal.         Behavior: Behavior normal.         Laboratory Data:  Lab Results   Component Value Date    WBC 2.78 (L) 08/23/2022    HGB 10.1 (L) 08/23/2022    HCT 30.7 (L) 08/23/2022    MCV 91 08/23/2022    PLT 57 (L) 08/23/2022      CMP  Sodium   Date Value Ref Range Status   08/23/2022 139 136 - 145 mmol/L Final     Potassium   Date Value Ref Range Status   08/23/2022 3.2 (L) 3.5 - 5.1 mmol/L Final     Chloride   Date Value Ref Range Status   08/23/2022 105 95 - 110 mmol/L Final     CO2   Date Value Ref Range " Status   08/23/2022 27 22 - 31 mmol/L Final     Glucose   Date Value Ref Range Status   08/23/2022 121 (H) 70 - 110 mg/dL Final     Comment:     The ADA recommends the following guidelines for fasting glucose:    Normal:       less than 100 mg/dL    Prediabetes:  100 mg/dL to 125 mg/dL    Diabetes:     126 mg/dL or higher       BUN   Date Value Ref Range Status   08/23/2022 14 7 - 18 mg/dL Final     Creatinine   Date Value Ref Range Status   08/23/2022 0.63 0.50 - 1.40 mg/dL Final     Calcium   Date Value Ref Range Status   08/23/2022 8.0 (L) 8.4 - 10.2 mg/dL Final     Total Protein   Date Value Ref Range Status   08/23/2022 6.6 6.0 - 8.4 g/dL Final     Albumin   Date Value Ref Range Status   08/23/2022 3.1 (L) 3.5 - 5.2 g/dL Final     Total Bilirubin   Date Value Ref Range Status   08/23/2022 1.0 0.2 - 1.3 mg/dL Final     Alkaline Phosphatase   Date Value Ref Range Status   08/23/2022 197 (H) 38 - 145 U/L Final     AST   Date Value Ref Range Status   08/23/2022 87 (H) 14 - 36 U/L Final     ALT   Date Value Ref Range Status   08/23/2022 64 (H) 0 - 35 U/L Final     Anion Gap   Date Value Ref Range Status   08/23/2022 7 (L) 8 - 16 mmol/L Final     eGFR if    Date Value Ref Range Status   06/15/2022 >60 >60 mL/min/1.73 m^2 Final     eGFR if non    Date Value Ref Range Status   06/15/2022 >60 >60 mL/min/1.73 m^2 Final     Comment:     Calculation used to obtain the estimated glomerular filtration  rate (eGFR) is the CKD-EPI equation.            Imaging:   EXAMINATION:  CT ABDOMEN PELVIS WITHOUT CONTRAST     CLINICAL HISTORY:  Abdominal pain, acute, nonlocalized;     TECHNIQUE:  Low dose axial images, sagittal and coronal reformations were obtained from the lung bases to the pubic symphysis.  .  Oral contrast not given.  .  Automated exposure control used.     COMPARISON:  None     FINDINGS:  The liver slightly decreased size and irregular contour indicating cirrhosis.  No discrete  focal lesion.  Gallbladder removed.  Pancreas normal.  Stomach decompressed.  Spleen is severely enlarged measuring 17.5 cm across.  Numerous periportal, splenic hilar, mesenteric, omental collateral vessels.  Generalized hazy density throughout the mesentery more significant centrally.  Adrenal glands normal.  Kidneys normal size and contour with no hydronephrosis.  Aorta tapers normally.  No bowel obstruction, ascites, or significant lymphadenopathy seen.  Bladder and rectum normal.  Bones intact.  Lung bases clear.     Impression:     Generalized mesenteric edema versus mesenteric adenitis.     Findings consistent with cirrhosis and portal hypertension including numerous collateral vessels throughout the abdomen and periportal region and severe splenomegaly.        Electronically signed by: Woody Mckinley MD  Date:                                            06/03/2022  Time:                                           16:06    Assessment:       1. Hepatitis A antibody positive    2. Hepatitis B surface antigen positive    3. Hepatic cirrhosis, unspecified hepatic cirrhosis type, unspecified whether ascites present    4. Pancytopenia    5. Anasarca         Plan:     Pancytopenia likely due to acute hepatitis infection/ decompensation   -CT A/P shows splenomegaly and findings consistent with cirrhosis  -Discussed CT results with patient and need for further work up with multiple blood tests  -Hx of IV drug use certainly puts her at risk for underlying viral cause , negative HIV, hepatitis panel positive for HepB surface and C IgM, and recd to get Hep B DNA PCR; given her the diagnosis of possible acute hepatitis B   -was supposed to follow up with hepatology but was not done, sent a msg to  Dr Mason staff to rescheduling her as soon as possible  - patient wanted things to get done quickly, advice her to go to the ER.   - ANC mild 1.7 likely due to above, with anemia (Hb>7) no need for transfusion and  thrombocytopenia; no need for plts transfusion, will cont monitoring     Cirrhosis/hepatitis B; anasarca  - will repeat her CMP/INR/CBC but likely decompensation of her underlying cirrhosis   -  Needs to be establish with hepatology as soon as possible       Patient queried and all questions were answered.    Route Chart for Scheduling    Med Onc Chart Routing      Follow up with physician 3 months. With MD, needs blood work    Follow up with ELISHA    Infusion scheduling note    Injection scheduling note    Labs CBC, CMP and other   Lab interval:     Imaging    Pharmacy appointment    Other referrals

## 2022-08-24 ENCOUNTER — TELEPHONE (OUTPATIENT)
Dept: HEPATOLOGY | Facility: CLINIC | Age: 47
End: 2022-08-24
Payer: MEDICAID

## 2022-08-24 PROBLEM — K74.60 HEPATIC CIRRHOSIS: Status: ACTIVE | Noted: 2022-08-23

## 2022-08-24 PROBLEM — R76.8 HEPATITIS B SURFACE ANTIGEN POSITIVE: Status: ACTIVE | Noted: 2022-08-24

## 2022-08-24 NOTE — TELEPHONE ENCOUNTER
----- Message from Callum Mason MD sent at 8/24/2022 10:42 AM CDT -----  Regarding: RE: cirrhosis/hep B  Tomorrow am at 10- I'm on the transplant side but should have time to see her- I do see that she is still in hospital though    ----- Message -----  From: Janae Robison MA  Sent: 8/23/2022  12:08 PM CDT  To: Callum Mason MD  Subject: FW: cirrhosis/hep B                              Can you please advice   ----- Message -----  From: Sheri Sarah RN  Sent: 8/23/2022  10:45 AM CDT  To: Marlon Nolen Staff  Subject: cirrhosis/hep B                                  Good Morning,   Dr Bailon is requesting that the attached patient be seen by Dr Mason ASAP, this week if possible. Please contact patients mother Martha to schedule her appointment at [phone].  Thank you,  Sheri, RN

## 2022-08-24 NOTE — TELEPHONE ENCOUNTER
Ma contacted Ms. Martha who stated pt is at [facility] due to swelling and pain and don't know when pt will be out of the hospital so I told her to give us a call back to schedule

## 2022-08-25 ENCOUNTER — TELEPHONE (OUTPATIENT)
Dept: FAMILY MEDICINE | Facility: CLINIC | Age: 47
End: 2022-08-25

## 2022-08-25 PROBLEM — D70.9 NEUTROPENIA: Status: ACTIVE | Noted: 2022-08-25

## 2022-08-25 LAB — HCV RNA SERPL NAA+PROBE-ACNC: NORMAL IU/ML

## 2022-08-25 NOTE — TELEPHONE ENCOUNTER
HFU scheduled for 9/7/22 at 1:40pm  Will call patient tomorrow for patient outreach once she is home.

## 2022-08-25 NOTE — TELEPHONE ENCOUNTER
----- Message from Chyna Luu MA sent at 8/25/2022  2:50 PM CDT -----  Marie from [place] case management calling to set up a HFU for the pt, pt is being discharged today. # [phone]

## 2022-08-26 LAB
HBV DNA SERPL NAA+PROBE-ACNC: ABNORMAL IU/ML
HBV DNA SERPL NAA+PROBE-LOG IU: 7.16 LOG (10) IU/ML
HBV DNA SERPL QL NAA+PROBE: DETECTED

## 2022-08-29 ENCOUNTER — TELEPHONE (OUTPATIENT)
Dept: HEPATOLOGY | Facility: CLINIC | Age: 47
End: 2022-08-29
Payer: MEDICAID

## 2022-08-29 ENCOUNTER — TELEPHONE (OUTPATIENT)
Dept: FAMILY MEDICINE | Facility: CLINIC | Age: 47
End: 2022-08-29

## 2022-08-29 NOTE — TELEPHONE ENCOUNTER
----- Message from Callum Mason MD sent at 8/29/2022  9:15 AM CDT -----  She can see anyone includinfg an ALEX      ----- Message -----  From: Janae Robison MA  Sent: 8/28/2022   8:45 AM CDT  To: Callum Mason MD    Hey can you let me know a good day and time to get pt in ?  ----- Message -----  From: Erna Bailon MD  Sent: 8/28/2022   4:32 AM CDT  To: Callum Mason MD, #    Good morning, everyone, can we please get this patient to see Dr Mason, as soon as possible.     She does have issues with her phone and following up with her alex but she told me she fix those issues now and she is aware of her diagnosis     Please let me know,     Thank you,

## 2022-08-29 NOTE — TELEPHONE ENCOUNTER
----- Message from Chyna Luu MA sent at 8/29/2022 11:00 AM CDT -----  Pt calling to move her appt on 9/7 because she has transportation problems and there is nothing that  can do for her because all she can do is refer her to someone and the hospital has already done that, so it would be a waste of everyone's time if she came on the 7th. # [phone]

## 2022-08-30 ENCOUNTER — TELEPHONE (OUTPATIENT)
Dept: HEPATOLOGY | Facility: CLINIC | Age: 47
End: 2022-08-30
Payer: MEDICAID

## 2022-08-30 ENCOUNTER — TELEPHONE (OUTPATIENT)
Dept: FAMILY MEDICINE | Facility: CLINIC | Age: 47
End: 2022-08-30

## 2022-08-30 NOTE — TELEPHONE ENCOUNTER
----- Message from Janae Robison MA sent at 8/29/2022  9:24 AM CDT -----    ----- Message -----  From: Callum Mason MD  Sent: 8/29/2022   9:16 AM CDT  To: Janae Robison MA    She can see anyone includinfg an ALEX      ----- Message -----  From: Janae Robison MA  Sent: 8/28/2022   8:45 AM CDT  To: Callum Mason MD    Hey can you let me know a good day and time to get pt in ?  ----- Message -----  From: Erna Bailon MD  Sent: 8/28/2022   4:32 AM CDT  To: Callum Mason MD, #    Good morning, everyone, can we please get this patient to see Dr Mason, as soon as possible.     She does have issues with her phone and following up with her alex but she told me she fix those issues now and she is aware of her diagnosis     Please let me know,     Thank you,

## 2022-08-30 NOTE — TELEPHONE ENCOUNTER
----- Message from Chyna Luu MA sent at 8/30/2022  2:33 PM CDT -----  Pt is calling today and this is the second attempt to get a call back from the nurse about rescheduling her 9/7 appt due to transportation issues and having to see a specialist. She needs to speak with her today it is very important. # [phone]

## 2022-08-31 ENCOUNTER — TELEPHONE (OUTPATIENT)
Dept: HEPATOLOGY | Facility: CLINIC | Age: 47
End: 2022-08-31
Payer: MEDICAID

## 2022-09-07 ENCOUNTER — TELEPHONE (OUTPATIENT)
Dept: FAMILY MEDICINE | Facility: CLINIC | Age: 47
End: 2022-09-07

## 2022-09-07 DIAGNOSIS — G89.4 CHRONIC PAIN SYNDROME: ICD-10-CM

## 2022-09-07 DIAGNOSIS — I10 ESSENTIAL HYPERTENSION: ICD-10-CM

## 2022-09-07 DIAGNOSIS — F41.9 ANXIETY: ICD-10-CM

## 2022-09-07 RX ORDER — DIAZEPAM 10 MG/1
10 TABLET ORAL 3 TIMES DAILY
Qty: 90 TABLET | Refills: 0 | Status: SHIPPED | OUTPATIENT
Start: 2022-09-07 | End: 2022-10-03 | Stop reason: SDUPTHER

## 2022-09-07 RX ORDER — OXYCODONE HYDROCHLORIDE 15 MG/1
15 TABLET ORAL EVERY 6 HOURS PRN
Qty: 105 TABLET | Refills: 0 | Status: SHIPPED | OUTPATIENT
Start: 2022-09-07 | End: 2022-10-03 | Stop reason: SDUPTHER

## 2022-09-07 RX ORDER — LISINOPRIL 10 MG/1
10 TABLET ORAL DAILY
Qty: 90 TABLET | Refills: 0 | Status: SHIPPED | OUTPATIENT
Start: 2022-09-07 | End: 2022-10-13 | Stop reason: SDUPTHER

## 2022-09-07 NOTE — TELEPHONE ENCOUNTER
----- Message from Maren Mccrary sent at 9/7/2022  3:39 PM CDT -----  Patient called and stated that she would like to know if she can get the doctor to write her prescription so that medicaid can pay for her pain medicine

## 2022-09-07 NOTE — TELEPHONE ENCOUNTER
----- Message from Maren Mccrary sent at 9/7/2022 10:30 AM CDT -----  Patient called and stated that she want to leave another number in case there is any questions about her medicine refill [phone]

## 2022-09-07 NOTE — TELEPHONE ENCOUNTER
----- Message from Trista Crespo sent at 9/7/2022  9:29 AM CDT -----  Pt wants to speak with the nurse,. She is waiting at the pharmacy for her refills. Pt #[phone]

## 2022-09-07 NOTE — TELEPHONE ENCOUNTER
Pain medicine hasnt even been called in yet to determine if it needs PA.    If a PA is done (if needed) and it is denied, patient will have to pay cash.

## 2022-09-08 ENCOUNTER — TELEPHONE (OUTPATIENT)
Dept: FAMILY MEDICINE | Facility: CLINIC | Age: 47
End: 2022-09-08

## 2022-09-12 ENCOUNTER — TELEPHONE (OUTPATIENT)
Dept: FAMILY MEDICINE | Facility: CLINIC | Age: 47
End: 2022-09-12

## 2022-09-12 NOTE — TELEPHONE ENCOUNTER
----- Message from Trista Crespo sent at 9/12/2022  2:59 PM CDT -----  PA for Oxycodone has been approved for 9/8/22 - 1/8/23. Thank you

## 2022-09-21 ENCOUNTER — TELEPHONE (OUTPATIENT)
Dept: TRANSPLANT | Facility: CLINIC | Age: 47
End: 2022-09-21
Payer: MEDICAID

## 2022-09-21 NOTE — TELEPHONE ENCOUNTER
----- Message from Xu Nielsen sent at 9/21/2022 10:10 AM CDT -----    Hepatology referral received and scanned into media; pt chart sent to referral nurse for medical review.     Recs also in Epic    Referring Provider: Cristian Ayala MD   Phone: [phone]   Fax: [phone]         .

## 2022-09-28 ENCOUNTER — DOCUMENTATION ONLY (OUTPATIENT)
Dept: TRANSPLANT | Facility: CLINIC | Age: 47
End: 2022-09-28
Payer: MEDICAID

## 2022-09-28 NOTE — LETTER
September 28, 2022    Marie Damon  [id number] [facility] [place] [address]      Dear Marie Damon:    Your doctor has referred you to the [facility]. We are sending this letter to remind you to make an appointment with us to complete the referral process.     Please call us at [phone] to schedule an appointment. We look forward to seeing you soon.     If you received a call and have been scheduled, please disregard this letter.       Sincerely,        [facility]   [address] [place] [id number]  [phone]

## 2022-09-28 NOTE — NURSING
By all notes she has transportation issues.  Maybe [facility] would be better for her.    Pt records reviewed.  Pt will be referred to Hepatology due to active HEP B with cirrhosis MELD 6  Initial referral received  from Referring Provider: Cristian Ayala MD   Phone: [phone]   Fax: [phone]      Referral letter sent to patient.      RECORDS SCANNED IN MEDIA UNDER HEPATOLOGY REFERRAL .

## 2022-10-03 ENCOUNTER — TELEPHONE (OUTPATIENT)
Dept: HEPATOLOGY | Facility: CLINIC | Age: 47
End: 2022-10-03
Payer: MEDICAID

## 2022-10-03 DIAGNOSIS — Z76.0 MEDICATION REFILL: Primary | ICD-10-CM

## 2022-10-03 DIAGNOSIS — F41.9 ANXIETY: ICD-10-CM

## 2022-10-03 DIAGNOSIS — G89.4 CHRONIC PAIN SYNDROME: ICD-10-CM

## 2022-10-03 RX ORDER — DIAZEPAM 10 MG/1
10 TABLET ORAL 3 TIMES DAILY
Qty: 90 TABLET | Refills: 0 | Status: SHIPPED | OUTPATIENT
Start: 2022-10-07 | End: 2022-10-24 | Stop reason: SDUPTHER

## 2022-10-03 RX ORDER — GABAPENTIN 300 MG/1
300 CAPSULE ORAL 2 TIMES DAILY
Qty: 60 CAPSULE | Refills: 2 | Status: SHIPPED | OUTPATIENT
Start: 2022-10-06 | End: 2022-11-04 | Stop reason: SDUPTHER

## 2022-10-03 RX ORDER — ONDANSETRON 4 MG/1
4 TABLET, ORALLY DISINTEGRATING ORAL EVERY 8 HOURS PRN
Qty: 30 TABLET | Refills: 0 | Status: SHIPPED | OUTPATIENT
Start: 2022-10-03 | End: 2022-10-13 | Stop reason: SDUPTHER

## 2022-10-03 RX ORDER — OXYCODONE HYDROCHLORIDE 15 MG/1
15 TABLET ORAL EVERY 6 HOURS PRN
Qty: 100 TABLET | Refills: 0 | Status: SHIPPED | OUTPATIENT
Start: 2022-10-07 | End: 2022-11-04 | Stop reason: SDUPTHER

## 2022-10-03 NOTE — TELEPHONE ENCOUNTER
Patient calling back about refills - also needs pain medication.    [facility]

## 2022-10-03 NOTE — TELEPHONE ENCOUNTER
----- Message from Martell López sent at 10/3/2022  1:08 PM CDT -----  Regarding: call back  Pt call to schedule appt    Call

## 2022-10-03 NOTE — TELEPHONE ENCOUNTER
----- Message from Trista Crespo sent at 10/3/2022  2:47 PM CDT -----  Refill for gabapentin, valium. [facility].

## 2022-10-03 NOTE — TELEPHONE ENCOUNTER
MA contacted pt she stated she was trying to get in soon due to she be hearing things experiencing so many differ things also stated numbness to the hands

## 2022-10-13 DIAGNOSIS — Z76.0 MEDICATION REFILL: ICD-10-CM

## 2022-10-13 DIAGNOSIS — M79.662 PAIN AND SWELLING OF LOWER LEG, LEFT: ICD-10-CM

## 2022-10-13 DIAGNOSIS — I10 ESSENTIAL HYPERTENSION: ICD-10-CM

## 2022-10-13 DIAGNOSIS — G89.4 CHRONIC PAIN SYNDROME: ICD-10-CM

## 2022-10-13 DIAGNOSIS — M79.89 PAIN AND SWELLING OF LOWER LEG, LEFT: ICD-10-CM

## 2022-10-13 RX ORDER — GABAPENTIN 300 MG/1
300 CAPSULE ORAL 2 TIMES DAILY
Qty: 60 CAPSULE | Refills: 2 | Status: CANCELLED | OUTPATIENT
Start: 2022-10-13

## 2022-10-13 NOTE — TELEPHONE ENCOUNTER
Spoke with patient states she lost all of her medications, needs all medications called in to [facility].  -DN

## 2022-10-13 NOTE — TELEPHONE ENCOUNTER
----- Message from Chyna Luu MA sent at 10/13/2022  9:04 AM CDT -----  Pt is calling and requesting a call back  asap and states it is an emergency. [phone]

## 2022-10-14 ENCOUNTER — TELEPHONE (OUTPATIENT)
Dept: FAMILY MEDICINE | Facility: CLINIC | Age: 47
End: 2022-10-14

## 2022-10-14 RX ORDER — SENNOSIDES 8.6 MG/1
1 TABLET ORAL DAILY PRN
Qty: 30 TABLET | Refills: 0 | Status: SHIPPED | OUTPATIENT
Start: 2022-10-14 | End: 2022-10-24 | Stop reason: SDUPTHER

## 2022-10-14 RX ORDER — FUROSEMIDE 40 MG/1
40 TABLET ORAL DAILY
Qty: 30 TABLET | Refills: 1 | Status: SHIPPED | OUTPATIENT
Start: 2022-10-14 | End: 2022-11-17 | Stop reason: SDUPTHER

## 2022-10-14 RX ORDER — BISOPROLOL FUMARATE 5 MG/1
5 TABLET, FILM COATED ORAL DAILY
Qty: 30 TABLET | Refills: 0 | Status: SHIPPED | OUTPATIENT
Start: 2022-10-14 | End: 2022-10-24 | Stop reason: SDUPTHER

## 2022-10-14 RX ORDER — DICLOFENAC SODIUM 10 MG/G
2 GEL TOPICAL 4 TIMES DAILY
Qty: 100 G | Refills: 0 | Status: SHIPPED | OUTPATIENT
Start: 2022-10-14 | End: 2023-10-11

## 2022-10-14 RX ORDER — LISINOPRIL 10 MG/1
10 TABLET ORAL DAILY
Qty: 30 TABLET | Refills: 0 | Status: SHIPPED | OUTPATIENT
Start: 2022-10-14 | End: 2022-10-24 | Stop reason: SDUPTHER

## 2022-10-14 RX ORDER — SPIRONOLACTONE 100 MG/1
100 TABLET, FILM COATED ORAL DAILY
Qty: 30 TABLET | Refills: 0 | Status: SHIPPED | OUTPATIENT
Start: 2022-10-14 | End: 2022-10-24 | Stop reason: SDUPTHER

## 2022-10-14 RX ORDER — ONDANSETRON 4 MG/1
4 TABLET, ORALLY DISINTEGRATING ORAL EVERY 8 HOURS PRN
Qty: 30 TABLET | Refills: 0 | Status: SHIPPED | OUTPATIENT
Start: 2022-10-14 | End: 2022-10-24 | Stop reason: SDUPTHER

## 2022-10-14 NOTE — TELEPHONE ENCOUNTER
----- Message from Maren Mccrary sent at 10/14/2022  8:33 AM CDT -----  Patient called and stated that she called twice and her medicine still has not been replaced she need to know when her medicine can be called in please give her a call at [phone]

## 2022-10-20 ENCOUNTER — TELEPHONE (OUTPATIENT)
Dept: FAMILY MEDICINE | Facility: CLINIC | Age: 47
End: 2022-10-20

## 2022-10-20 NOTE — TELEPHONE ENCOUNTER
Per Dr Sun, if she is actively suicidal / has a plan then needs to go to ER. And she can be referred to psych outpatient for long term.    Spoke to patient. She said that she has voices in her head that tell her to kill herself but that she doesn't want to. She said that the ER told her that she could go to the [facility] but that she would have to stay 5-7 days and she has an appt with the liver doctor on Monday that they have worked hard for her to get into and she cannot miss that. Advised that it is vital to get these mental health issues under control so it wont cause her so much upset. Again advised her to call 911 if she needs transportation or be brought to the ER. I told her also for long term we can refer her to psych but would need to see who takes medicaid in the area she lives in. She stated yall have been a big help and hung up the phone.

## 2022-10-20 NOTE — TELEPHONE ENCOUNTER
----- Message from Maren Mccrary sent at 10/20/2022  3:13 PM CDT -----  Patient called and stated that she miss placed her medicine she is hearing voices telling her to kill herself and she went to ER and they advised her to check herself into a mental hospital and sent her home she stated that she is still hearing the voices

## 2022-10-24 ENCOUNTER — TELEPHONE (OUTPATIENT)
Dept: HEPATOLOGY | Facility: CLINIC | Age: 47
End: 2022-10-24
Payer: MEDICAID

## 2022-10-24 ENCOUNTER — OFFICE VISIT (OUTPATIENT)
Dept: HEPATOLOGY | Facility: CLINIC | Age: 47
End: 2022-10-24
Payer: MEDICAID

## 2022-10-24 VITALS
BODY MASS INDEX: 27.56 KG/M2 | HEART RATE: 85 BPM | WEIGHT: 196.88 LBS | HEIGHT: 71 IN | TEMPERATURE: 98 F | OXYGEN SATURATION: 98 % | SYSTOLIC BLOOD PRESSURE: 113 MMHG | RESPIRATION RATE: 18 BRPM | DIASTOLIC BLOOD PRESSURE: 66 MMHG

## 2022-10-24 DIAGNOSIS — F41.9 ANXIETY: ICD-10-CM

## 2022-10-24 DIAGNOSIS — Z76.0 MEDICATION REFILL: ICD-10-CM

## 2022-10-24 DIAGNOSIS — I10 ESSENTIAL HYPERTENSION: ICD-10-CM

## 2022-10-24 DIAGNOSIS — B18.1 CHRONIC HEPATITIS B: ICD-10-CM

## 2022-10-24 DIAGNOSIS — B18.1 CHRONIC VIRAL HEPATITIS B WITHOUT DELTA AGENT AND WITHOUT COMA: Primary | ICD-10-CM

## 2022-10-24 DIAGNOSIS — R76.8 HEPATITIS A ANTIBODY POSITIVE: ICD-10-CM

## 2022-10-24 PROCEDURE — 99205 OFFICE O/P NEW HI 60 MIN: CPT | Mod: S$PBB,,, | Performed by: INTERNAL MEDICINE

## 2022-10-24 PROCEDURE — 4010F PR ACE/ARB THEARPY RXD/TAKEN: ICD-10-PCS | Mod: CPTII,,, | Performed by: INTERNAL MEDICINE

## 2022-10-24 PROCEDURE — 3061F PR NEG MICROALBUMINURIA RESULT DOCUMENTED/REVIEW: ICD-10-PCS | Mod: CPTII,,, | Performed by: INTERNAL MEDICINE

## 2022-10-24 PROCEDURE — 3074F PR MOST RECENT SYSTOLIC BLOOD PRESSURE < 130 MM HG: ICD-10-PCS | Mod: CPTII,,, | Performed by: INTERNAL MEDICINE

## 2022-10-24 PROCEDURE — 4010F ACE/ARB THERAPY RXD/TAKEN: CPT | Mod: CPTII,,, | Performed by: INTERNAL MEDICINE

## 2022-10-24 PROCEDURE — 99205 PR OFFICE/OUTPT VISIT, NEW, LEVL V, 60-74 MIN: ICD-10-PCS | Mod: S$PBB,,, | Performed by: INTERNAL MEDICINE

## 2022-10-24 PROCEDURE — 3066F NEPHROPATHY DOC TX: CPT | Mod: CPTII,,, | Performed by: INTERNAL MEDICINE

## 2022-10-24 PROCEDURE — 99214 OFFICE O/P EST MOD 30 MIN: CPT | Mod: PBBFAC | Performed by: INTERNAL MEDICINE

## 2022-10-24 PROCEDURE — 1160F RVW MEDS BY RX/DR IN RCRD: CPT | Mod: CPTII,,, | Performed by: INTERNAL MEDICINE

## 2022-10-24 PROCEDURE — 3078F PR MOST RECENT DIASTOLIC BLOOD PRESSURE < 80 MM HG: ICD-10-PCS | Mod: CPTII,,, | Performed by: INTERNAL MEDICINE

## 2022-10-24 PROCEDURE — 1159F PR MEDICATION LIST DOCUMENTED IN MEDICAL RECORD: ICD-10-PCS | Mod: CPTII,,, | Performed by: INTERNAL MEDICINE

## 2022-10-24 PROCEDURE — 1160F PR REVIEW ALL MEDS BY PRESCRIBER/CLIN PHARMACIST DOCUMENTED: ICD-10-PCS | Mod: CPTII,,, | Performed by: INTERNAL MEDICINE

## 2022-10-24 PROCEDURE — 3066F PR DOCUMENTATION OF TREATMENT FOR NEPHROPATHY: ICD-10-PCS | Mod: CPTII,,, | Performed by: INTERNAL MEDICINE

## 2022-10-24 PROCEDURE — 3078F DIAST BP <80 MM HG: CPT | Mod: CPTII,,, | Performed by: INTERNAL MEDICINE

## 2022-10-24 PROCEDURE — 3074F SYST BP LT 130 MM HG: CPT | Mod: CPTII,,, | Performed by: INTERNAL MEDICINE

## 2022-10-24 PROCEDURE — 1159F MED LIST DOCD IN RCRD: CPT | Mod: CPTII,,, | Performed by: INTERNAL MEDICINE

## 2022-10-24 PROCEDURE — 99999 PR PBB SHADOW E&M-EST. PATIENT-LVL IV: ICD-10-PCS | Mod: PBBFAC,,, | Performed by: INTERNAL MEDICINE

## 2022-10-24 PROCEDURE — 99999 PR PBB SHADOW E&M-EST. PATIENT-LVL IV: CPT | Mod: PBBFAC,,, | Performed by: INTERNAL MEDICINE

## 2022-10-24 PROCEDURE — 3061F NEG MICROALBUMINURIA REV: CPT | Mod: CPTII,,, | Performed by: INTERNAL MEDICINE

## 2022-10-24 RX ORDER — LISINOPRIL 10 MG/1
10 TABLET ORAL DAILY
Qty: 30 TABLET | Refills: 2 | Status: SHIPPED | OUTPATIENT
Start: 2022-10-24 | End: 2023-02-13 | Stop reason: SDUPTHER

## 2022-10-24 RX ORDER — SPIRONOLACTONE 100 MG/1
100 TABLET, FILM COATED ORAL DAILY
Qty: 30 TABLET | Refills: 2 | Status: SHIPPED | OUTPATIENT
Start: 2022-10-24 | End: 2023-02-13 | Stop reason: SDUPTHER

## 2022-10-24 RX ORDER — ONDANSETRON 4 MG/1
4 TABLET, ORALLY DISINTEGRATING ORAL EVERY 8 HOURS PRN
Qty: 30 TABLET | Refills: 0 | Status: SHIPPED | OUTPATIENT
Start: 2022-10-24 | End: 2022-11-17 | Stop reason: SDUPTHER

## 2022-10-24 RX ORDER — SENNOSIDES 8.6 MG/1
1 TABLET ORAL DAILY PRN
Qty: 30 TABLET | Refills: 2 | Status: SHIPPED | OUTPATIENT
Start: 2022-10-24 | End: 2023-11-02

## 2022-10-24 RX ORDER — BISOPROLOL FUMARATE 5 MG/1
5 TABLET, FILM COATED ORAL DAILY
Qty: 30 TABLET | Refills: 2 | Status: SHIPPED | OUTPATIENT
Start: 2022-10-24 | End: 2022-11-17 | Stop reason: SDUPTHER

## 2022-10-24 RX ORDER — DIAZEPAM 10 MG/1
10 TABLET ORAL 3 TIMES DAILY
Qty: 90 TABLET | Refills: 0 | Status: SHIPPED | OUTPATIENT
Start: 2022-10-24 | End: 2022-12-21 | Stop reason: SDUPTHER

## 2022-10-24 RX ORDER — TENOFOVIR ALAFENAMIDE 25 MG/1
25 TABLET ORAL DAILY
Qty: 30 TABLET | Refills: 11 | Status: SHIPPED | OUTPATIENT
Start: 2022-10-24 | End: 2023-01-13 | Stop reason: SDUPTHER

## 2022-10-24 NOTE — TELEPHONE ENCOUNTER
The patient's prescription has been approved and sent to   [id number] - [facility] [id number] [place] [address] AT [facility] OF [address] [facility] [address]  [address] [address] [address]  Phone: [phone] Fax: [phone]

## 2022-10-24 NOTE — TELEPHONE ENCOUNTER
----- Message from Maren Mccrary sent at 10/24/2022  4:02 PM CDT -----  Patient called and stated that she need refills of all her medicine called into  [facility] in [place] on [address] if any questions please give her a call back at [phone]

## 2022-10-24 NOTE — PROGRESS NOTES
Subjective:       Patient ID: Marie Damon is a 47 y.o. female.    Chief Complaint: Hepatitis B    HPI  I saw this 47 y.o. lady with HBV infection and liver failure. She came to clinic alone and was very tearful and upset because she had been told that she needed a liver transplant.    -recently presented to hospital and local GI with elevated LFTs, significant leg edema and ascites.    - low platelets  - AST/ALT= 178/122 + high Alk Phos  - normal bilirubin    HBV sAg +ve + DNA 14 million  HCV neg      Ex IV drugs  No alcohol issues    MELD-Na score: 6 at 8/25/2022  5:31 AM  MELD score: 6 at 8/25/2022  5:31 AM  Calculated from:  Serum Creatinine: 0.79 mg/dL (Using min of 1 mg/dL) at 8/25/2022  5:31 AM  Serum Sodium: 142 mmol/L (Using max of 137 mmol/L) at 8/25/2022  5:31 AM  Total Bilirubin: 0.6 mg/dL (Using min of 1 mg/dL) at 8/25/2022  5:31 AM  INR(ratio): 1 at 8/23/2022  7:25 PM  Age: 46 years      CT abdo: 10/7/22  1. Cirrhosis with portal venous hypertension changes with massive splenomegaly and with multiple abdominal varices present.  2. Intrahepatic and extrahepatic biliary ductal dilatation.  This could be compensatory from cholecystectomy.  Correlate with bilirubin levels.  Pancreatic duct appears mildly prominent.  3. Moderate colonic stool.  4. Minimal hiatal hernia.  5. Additional findings as above    PMH:  Hypertension  HBV?- previous drug use  Cholecystectomy  Hysterectomy      SH:  Lives with mother and sister is nearby    Review of Systems   Constitutional:  Negative for activity change, appetite change, chills, fatigue, fever and unexpected weight change.   HENT:  Negative for ear pain, hearing loss, nosebleeds, sore throat and trouble swallowing.    Eyes:  Negative for redness and visual disturbance.   Respiratory:  Negative for cough, chest tightness, shortness of breath and wheezing.    Cardiovascular:  Negative for chest pain and palpitations.   Gastrointestinal:  Negative for abdominal  distention, abdominal pain, blood in stool, constipation, diarrhea, nausea and vomiting.   Genitourinary:  Negative for difficulty urinating, dysuria, frequency, hematuria and urgency.   Musculoskeletal:  Negative for arthralgias, back pain, gait problem, joint swelling and myalgias.   Skin:  Negative for rash.   Neurological:  Negative for tremors, seizures, speech difficulty, weakness and headaches.   Hematological:  Negative for adenopathy.   Psychiatric/Behavioral:  Negative for confusion, decreased concentration and sleep disturbance. The patient is not nervous/anxious.        Lab Results   Component Value Date     (H) 10/19/2022     (H) 10/19/2022    ALKPHOS 237 (H) 10/19/2022    BILITOT 0.8 10/19/2022     Past Medical History:   Diagnosis Date    Bone spur     hip    HTN (hypertension)      Past Surgical History:   Procedure Laterality Date    CHOLECYSTECTOMY      HYSTERECTOMY       Current Outpatient Medications   Medication Sig    diclofenac sodium (VOLTAREN) 1 % Gel Apply 2 g topically 4 (four) times daily.    gabapentin (NEURONTIN) 300 MG capsule Take 1 capsule (300 mg total) by mouth 2 (two) times daily.    oxyCODONE (ROXICODONE) 15 MG Tab Take 1 tablet (15 mg total) by mouth every 6 (six) hours as needed for Pain.    bisoprolol (ZEBETA) 5 MG tablet Take 1 tablet (5 mg total) by mouth once daily.    diazePAM (VALIUM) 10 MG Tab Take 1 tablet (10 mg total) by mouth 3 (three) times daily.    furosemide (LASIX) 40 MG tablet Take 1 tablet (40 mg total) by mouth once daily. (Patient not taking: Reported on 10/24/2022)    lisinopriL 10 MG tablet Take 1 tablet (10 mg total) by mouth once daily.    naloxone (NARCAN) 4 mg/actuation Spry each nostril if not breathing or not responsive    ondansetron (ZOFRAN) 4 MG tablet Take 1 tablet (4 mg total) by mouth every 8 (eight) hours as needed for Nausea.    ondansetron (ZOFRAN-ODT) 4 MG TbDL Take 1 tablet (4 mg total) by mouth every 8 (eight) hours as  needed (nausea).    senna (SENOKOT) 8.6 mg tablet Take 1 tablet by mouth daily as needed for Constipation.    spironolactone (ALDACTONE) 100 MG tablet Take 1 tablet (100 mg total) by mouth once daily.    VEMLIDY 25 mg Tab Take 1 tablet (25 mg total) by mouth once daily.     No current facility-administered medications for this visit.       Objective:      Physical Exam  Constitutional:       General: She is not in acute distress.  HENT:      Head: Normocephalic.   Eyes:      Pupils: Pupils are equal, round, and reactive to light.   Neck:      Thyroid: No thyromegaly.      Vascular: No JVD.      Trachea: No tracheal deviation.   Cardiovascular:      Rate and Rhythm: Normal rate and regular rhythm.      Heart sounds: Normal heart sounds. No murmur heard.  Pulmonary:      Effort: Pulmonary effort is normal.      Breath sounds: Normal breath sounds. No stridor.   Abdominal:      Palpations: Abdomen is soft.   Musculoskeletal:      Right lower leg: Edema present.      Left lower leg: Edema present.   Lymphadenopathy:      Head:      Right side of head: No submental, submandibular, tonsillar, preauricular, posterior auricular or occipital adenopathy.      Left side of head: No submental, submandibular, tonsillar, preauricular, posterior auricular or occipital adenopathy.      Cervical: No cervical adenopathy.   Neurological:      Mental Status: She is alert. She is not disoriented.      Cranial Nerves: No cranial nerve deficit.      Sensory: No sensory deficit.         Assessment:       1. Chronic viral hepatitis B without delta agent and without coma    2. Hepatitis A antibody positive    3. Chronic hepatitis B          Plan:    She appears to have HBV infection that is likely chronic in nature and was probably acquired through drug use.    She presented with fluid overload that is currently well-controlled with low dose diuretics and she has not had encephalopathy.    Her MELD score n Aug 2022 was 6 and her LFTs are  mildly elevated with a normal blirubin. She may have cirrhosis but at this point I think we should be able to control her active HBV infection with tenofovir. I do not think she needs a liver transplant at present and I think she will improve signifiacntly with low dose diuretics and antivirals.    - I also discussed her case and ongoing need for benzos and pain relief with her PCP, Dr Sun. Although the use f these drugs in liver disease is not ideal, I think that the benefit that the patient gets from these drugs to her psychological well being outweighs the risks.    - start Tenofovir 25 mg daily- prescription sent to [facility]  - clinic in 6 weeks.

## 2022-10-24 NOTE — TELEPHONE ENCOUNTER
----- Message from Caitlin Amato sent at 10/24/2022  9:36 AM CDT -----  Regarding: Consult/Advisory:    Name Of Caller:  Marie    Contact Preference?:   [phone]         What is the nature of the call?:  Patient went to the ED over the weekend, [facility], and she said they took labs.  She wanted to let the office know this before she came for her appointment with Dr. Mason.

## 2022-10-24 NOTE — TELEPHONE ENCOUNTER
Call returned to the patient at [phone].  Patient informed that we do have the information in the system from your ER with the Lab results. Dr Mason will be able to see it.    Patient stated I am worried about my scheduled transportation. I have been speaking to them all week. They said they will be here for 1:45 pm to pick me up to come there for my visit (3:30 pm).  You can call them back about 1:15 pm to see if they are still on schedule. Voiced understanding.  We will see you in clinic.

## 2022-10-25 ENCOUNTER — SPECIALTY PHARMACY (OUTPATIENT)
Dept: PHARMACY | Facility: CLINIC | Age: 47
End: 2022-10-25
Payer: MEDICAID

## 2022-10-25 DIAGNOSIS — B18.1 CHRONIC HEPATITIS B: Primary | ICD-10-CM

## 2022-10-25 NOTE — TELEPHONE ENCOUNTER
Vemlidy test claim - $0 copay. No PA required. Benefits investigation not required ([place] [employer] - [facility]/[place] [employer]).

## 2022-10-25 NOTE — TELEPHONE ENCOUNTER
Contacted patient regarding Vemlidy initial consult. Patient was currently at the ED at time of call for suicidal ideations. It is unclear if patient will be transported to behavioral health center after being discharged from the ED.     [facility] was in the middle of setting up delivery for Vemlidy for tomorrow (10/25/22) but patient stated she had to go since doctor was walking into the room. Delivery not set up since unable to confirm address.     During the call, [facility] listened to patient's concerns and offered condolences. She states there is a problem in receiving her maintenance medications since she does not have any transportation. [facility] was going to offer coordinating getting prescriptions sent to [facility] for shipment but was unable to discuss prior to phone call being ended.     OSP will continue to f/u.

## 2022-10-25 NOTE — TELEPHONE ENCOUNTER
Specialty Pharmacy - Initial Clinical Assessment    Specialty Medication Orders Linked to Encounter      Flowsheet Row Most Recent Value   Medication #1 VEMLIDY 25 mg Tab (Order#[id number], Rx#[id number])          Patient Diagnosis   B18.1 - Chronic hepatitis B    Subjective    Marie Damon is a 47 y.o. female, who is followed by the specialty pharmacy service for management and education.    Recent Encounters       Date Type Provider Description    10/25/2022 Specialty Pharmacy Tia Caal PharmD Initial Clinical Assessment    10/25/2022 Specialty Pharmacy Tia Caal PharmD Referral Authorization          Clinical call attempts since last clinical assessment   No call attempts found.     Current Outpatient Medications   Medication Sig    bisoprolol (ZEBETA) 5 MG tablet Take 1 tablet (5 mg total) by mouth once daily.    diazePAM (VALIUM) 10 MG Tab Take 1 tablet (10 mg total) by mouth 3 (three) times daily.    diclofenac sodium (VOLTAREN) 1 % Gel Apply 2 g topically 4 (four) times daily.    furosemide (LASIX) 40 MG tablet Take 1 tablet (40 mg total) by mouth once daily. (Patient not taking: Reported on 10/24/2022)    gabapentin (NEURONTIN) 300 MG capsule Take 1 capsule (300 mg total) by mouth 2 (two) times daily.    lisinopriL 10 MG tablet Take 1 tablet (10 mg total) by mouth once daily.    naloxone (NARCAN) 4 mg/actuation Spry each nostril if not breathing or not responsive    ondansetron (ZOFRAN) 4 MG tablet Take 1 tablet (4 mg total) by mouth every 8 (eight) hours as needed for Nausea.    ondansetron (ZOFRAN-ODT) 4 MG TbDL Take 1 tablet (4 mg total) by mouth every 8 (eight) hours as needed (nausea).    oxyCODONE (ROXICODONE) 15 MG Tab Take 1 tablet (15 mg total) by mouth every 6 (six) hours as needed for Pain.    senna (SENOKOT) 8.6 mg tablet Take 1 tablet by mouth daily as needed for Constipation.    spironolactone (ALDACTONE) 100 MG tablet Take 1 tablet (100 mg total) by mouth once daily.    VEMLIDY  25 mg Tab Take 1 tablet (25 mg total) by mouth once daily.   Last reviewed on 10/24/2022  3:48 PM by Callum Mason MD    Review of patient's allergies indicates:   Allergen Reactions    Pcn [penicillins] Hives    Morphine Other (See Comments)     heartburn    Bupropion hcl Other (See Comments)    Demerol [meperidine]    Last reviewed on  10/24/2022 4:07 PM by Tia Hernandez    Drug Interactions    Drug interactions evaluated: yes  Clinically relevant drug interactions identified: no         Adverse Effects    Appetite change: Pos  Suicidal ideas: Pos       Assessment Questions - Documented Responses      Flowsheet Row Most Recent Value   Assessment    Medication Reconciliation completed for patient Yes   During the past 4 weeks, has patient missed any activities due to condition or medication? No   During the past 4 weeks, did patient have any of the following urgent care visits? ER Admission   Goals of Therapy Status Discussed (new start)   Status of the patients ability to self-administer: Is Able   All education points have been covered with patient? Yes, supplemental printed education provided   Welcome packet contents reviewed and discussed with patient? No   Assesment completed? Yes   Plan Therapy being initiated   Do you need to open a clinical intervention (i-vent)? No   Do you want to schedule first shipment? Yes          Refill Questions - Documented Responses      Flowsheet Row Most Recent Value   Patient Availability and HIPAA Verification    Does patient want to proceed with activity? Yes   HIPAA/medical authority confirmed? Yes   Relationship to patient of person spoken to? Self   Refill Screening Questions    When does the patient need to receive the medication? 10/26/22   Refill Delivery Questions    How will the patient receive the medication? [facility]   When does the patient need to receive the medication? 10/26/22   Shipping Address Home   Address in [facility] confirmed and updated if  "neccessary? Yes   Expected Copay ($) 0   Is the patient able to afford the medication copay? Yes   Payment Method zero copay   Days supply of Refill 30   Supplies needed? No supplies needed   Refill activity completed? Yes   Refill activity plan Refill scheduled   Shipment/Pickup Date: 10/25/22            Objective    She has a past medical history of Bone spur and HTN (hypertension).    Tried/failed medications: NONE    BP Readings from Last 4 Encounters:   10/24/22 113/66   10/19/22 (!) 98/59   10/07/22 (!) 96/53   08/27/22 126/79     Ht Readings from Last 4 Encounters:   10/24/22 5' 11" (1.803 m)   10/19/22 5' 11" (1.803 m)   10/07/22 5' 11" (1.803 m)   08/27/22 5' 11" (1.803 m)     Wt Readings from Last 4 Encounters:   10/24/22 89.3 kg (196 lb 13.9 oz)   10/19/22 90.7 kg (200 lb)   10/07/22 100.2 kg (220 lb 14.4 oz)   08/27/22 100.2 kg (220 lb 14.4 oz)     Recent Labs   Lab Result Units 10/19/22  1721 10/07/22  1511 08/27/22  0953 08/25/22  0531   Creatinine mg/dL 1.01 0.84 0.74 0.79   ALT U/L 122 H 170 H 57 H 51 H   AST U/L 178 H 251 H 100 H 75 H     The goals of prescribed drug therapy management include:  Supporting patient to meet the prescriber's medical treatment objectives  Improving or maintaining quality of life  Maintaining optimal therapy adherence  Minimizing and managing side effects      Goals of Therapy Status: Discussed (new start)    Assessment/Plan  Patient plans to start therapy on 10/26/22      Indication, dosage, appropriateness, effectiveness, safety and convenience of her specialty medication(s) were reviewed today.     Patient Education   Patient received education on the following:   Expectations and possible outcomes of therapy  Proper use, timely administration, and missed dose management  Duration of therapy  Side effects, including prevention, minimization, and management  Contraindications and safety precautions  New or changed medications, including prescribe and over the counter " medications and supplements  Reviews recommended vaccinations, as appropriate  Storage, safe handling, and disposal    Returned VM message patient left at 12:07 PM.     Vemlidy delivery set up for tomorrow (10/26/22) per patient request. It is still unclear if patient will be transferred to behavioral health center She confirms Vemlidy is being delivered to her mother's house so someone will be able to intake package.     [facility]P able to complete brief/summarized initial consult. [facility] will continue to f/u with patient.     Vemlidy (tenofovir) 25mg- Take one tablet by mouth once daily WITH food.  Counseling was reviewed:   1. Patient MUST take Vemlidy at the SAME time every day with food to reduce stomach irritation.    Clinical Review:  Diagnosis: Chronic viral hepatitis B without delta agent and without coma (B18.1)  HBV DNA: 14,618,582 IU/mL (8/23/22)  HBsAg (+); HBsAB (NOT DRAWN); HBcAB (NOT DRAWN); HBeAg (NOT DRAWN)  Fibrosis: None noted; CP: A LFTs: Bilirubin 0.8 mg/dL; Albumin 3.9 g/dL;  U/L;  U/L [10/19/22]  Renal: eGFR >60 mL/min; Creatinine 1.01 mg/dL; CrCl 83.2 mL/min - med/dose appropriate.    Tasks added this encounter   11/18/2022 - Refill Call (Auto Added)   Tasks due within next 3 months   No tasks due.     Tia Caal, PharmD  [facility]  [address] [facility] [address] [facility] [address]  Phone: [phone]  Fax: [phone]

## 2022-10-26 ENCOUNTER — TELEPHONE (OUTPATIENT)
Dept: FAMILY MEDICINE | Facility: CLINIC | Age: 47
End: 2022-10-26

## 2022-10-26 ENCOUNTER — PATIENT MESSAGE (OUTPATIENT)
Dept: PHARMACY | Facility: CLINIC | Age: 47
End: 2022-10-26
Payer: MEDICAID

## 2022-10-26 NOTE — TELEPHONE ENCOUNTER
Attempted (attempt 1) to contact patient regarding Vemlidy delivery and to ensure she will be able to retrieve the package today. NA - call went straight to . Nick sent.     There is a chance she has been admitted behavioral health center and phone has been turned off.

## 2022-10-31 NOTE — TELEPHONE ENCOUNTER
Attempted (attempt 2) to contact patient regarding Vemlidy delivery and to ensure she will be able to retrieve the package today. NA - call went straight to . Nick sent (10/26/22).      There is a chance she has been admitted behavioral health center and phone has been turned off.

## 2022-11-02 NOTE — TELEPHONE ENCOUNTER
Attempted (attempt 3) to contact patient regarding Vemlidy delivery and to ensure she will be able to retrieve the package today. NA - call went straight to . Nick sent (10/26/22).      There is a chance she has been admitted behavioral health center and phone has been turned off. Will f/u at refill call.

## 2022-11-04 DIAGNOSIS — G89.4 CHRONIC PAIN SYNDROME: ICD-10-CM

## 2022-11-04 RX ORDER — ESCITALOPRAM OXALATE 5 MG/1
5 TABLET ORAL DAILY
COMMUNITY
End: 2023-05-15

## 2022-11-04 RX ORDER — RISPERIDONE 0.5 MG/1
TABLET ORAL
COMMUNITY
End: 2022-12-21 | Stop reason: SDUPTHER

## 2022-11-04 RX ORDER — RISPERIDONE 1 MG/1
1 TABLET ORAL 2 TIMES DAILY
COMMUNITY
End: 2022-12-21 | Stop reason: SDUPTHER

## 2022-11-04 RX ORDER — OXYCODONE HYDROCHLORIDE 15 MG/1
15 TABLET ORAL EVERY 6 HOURS PRN
Qty: 100 TABLET | Refills: 0 | Status: SHIPPED | OUTPATIENT
Start: 2022-11-06 | End: 2022-11-29 | Stop reason: SDUPTHER

## 2022-11-04 RX ORDER — GABAPENTIN 300 MG/1
300 CAPSULE ORAL 2 TIMES DAILY
Qty: 60 CAPSULE | Refills: 2 | Status: SHIPPED | OUTPATIENT
Start: 2022-11-05 | End: 2022-12-06 | Stop reason: SDUPTHER

## 2022-11-04 RX ORDER — ESCITALOPRAM OXALATE 10 MG/1
10 TABLET ORAL DAILY
COMMUNITY
End: 2022-12-21 | Stop reason: SDUPTHER

## 2022-11-04 NOTE — TELEPHONE ENCOUNTER
Incoming call from patient. She confirms she received the Vemlidy shipment. She was on Vemlidy while at behavioral health center and will begin using personal bottle tomorrow (11/5/22) now that she's back home.     Patient was prescribed Lexapro 5 mg and 10 mg and Risperidone 1 mg and 0.5 mg. No DDIs with Vemlidy.     No other questions or concerns at this time.

## 2022-11-04 NOTE — TELEPHONE ENCOUNTER
----- Message from Chyna Luu MA sent at 11/4/2022 10:35 AM CDT -----  Pt calling for a refill on gabapentin, lisinopril and oxycodone. [phone]

## 2022-11-17 ENCOUNTER — OFFICE VISIT (OUTPATIENT)
Dept: FAMILY MEDICINE | Facility: CLINIC | Age: 47
End: 2022-11-17
Payer: MEDICAID

## 2022-11-17 ENCOUNTER — TELEPHONE (OUTPATIENT)
Dept: FAMILY MEDICINE | Facility: CLINIC | Age: 47
End: 2022-11-17

## 2022-11-17 VITALS
HEIGHT: 71 IN | HEART RATE: 74 BPM | SYSTOLIC BLOOD PRESSURE: 90 MMHG | WEIGHT: 206 LBS | DIASTOLIC BLOOD PRESSURE: 60 MMHG | OXYGEN SATURATION: 96 % | BODY MASS INDEX: 28.84 KG/M2

## 2022-11-17 DIAGNOSIS — Z12.31 ENCOUNTER FOR SCREENING MAMMOGRAM FOR MALIGNANT NEOPLASM OF BREAST: ICD-10-CM

## 2022-11-17 DIAGNOSIS — I10 ESSENTIAL HYPERTENSION: Primary | ICD-10-CM

## 2022-11-17 DIAGNOSIS — G89.4 CHRONIC PAIN SYNDROME: ICD-10-CM

## 2022-11-17 DIAGNOSIS — B18.1 CHRONIC HEPATITIS B: ICD-10-CM

## 2022-11-17 DIAGNOSIS — F41.9 ANXIETY: ICD-10-CM

## 2022-11-17 DIAGNOSIS — K74.60 CIRRHOSIS OF LIVER WITHOUT ASCITES, UNSPECIFIED HEPATIC CIRRHOSIS TYPE: ICD-10-CM

## 2022-11-17 PROCEDURE — 3008F BODY MASS INDEX DOCD: CPT | Mod: CPTII,S$GLB,, | Performed by: FAMILY MEDICINE

## 2022-11-17 PROCEDURE — 1159F MED LIST DOCD IN RCRD: CPT | Mod: CPTII,S$GLB,, | Performed by: FAMILY MEDICINE

## 2022-11-17 PROCEDURE — 3061F PR NEG MICROALBUMINURIA RESULT DOCUMENTED/REVIEW: ICD-10-PCS | Mod: CPTII,S$GLB,, | Performed by: FAMILY MEDICINE

## 2022-11-17 PROCEDURE — 99214 OFFICE O/P EST MOD 30 MIN: CPT | Mod: S$GLB,,, | Performed by: FAMILY MEDICINE

## 2022-11-17 PROCEDURE — 3061F NEG MICROALBUMINURIA REV: CPT | Mod: CPTII,S$GLB,, | Performed by: FAMILY MEDICINE

## 2022-11-17 PROCEDURE — 1159F PR MEDICATION LIST DOCUMENTED IN MEDICAL RECORD: ICD-10-PCS | Mod: CPTII,S$GLB,, | Performed by: FAMILY MEDICINE

## 2022-11-17 PROCEDURE — 3066F PR DOCUMENTATION OF TREATMENT FOR NEPHROPATHY: ICD-10-PCS | Mod: CPTII,S$GLB,, | Performed by: FAMILY MEDICINE

## 2022-11-17 PROCEDURE — 3008F PR BODY MASS INDEX (BMI) DOCUMENTED: ICD-10-PCS | Mod: CPTII,S$GLB,, | Performed by: FAMILY MEDICINE

## 2022-11-17 PROCEDURE — 4010F PR ACE/ARB THEARPY RXD/TAKEN: ICD-10-PCS | Mod: CPTII,S$GLB,, | Performed by: FAMILY MEDICINE

## 2022-11-17 PROCEDURE — 3078F DIAST BP <80 MM HG: CPT | Mod: CPTII,S$GLB,, | Performed by: FAMILY MEDICINE

## 2022-11-17 PROCEDURE — 1160F PR REVIEW ALL MEDS BY PRESCRIBER/CLIN PHARMACIST DOCUMENTED: ICD-10-PCS | Mod: CPTII,S$GLB,, | Performed by: FAMILY MEDICINE

## 2022-11-17 PROCEDURE — 3066F NEPHROPATHY DOC TX: CPT | Mod: CPTII,S$GLB,, | Performed by: FAMILY MEDICINE

## 2022-11-17 PROCEDURE — 3074F SYST BP LT 130 MM HG: CPT | Mod: CPTII,S$GLB,, | Performed by: FAMILY MEDICINE

## 2022-11-17 PROCEDURE — 4010F ACE/ARB THERAPY RXD/TAKEN: CPT | Mod: CPTII,S$GLB,, | Performed by: FAMILY MEDICINE

## 2022-11-17 PROCEDURE — 3078F PR MOST RECENT DIASTOLIC BLOOD PRESSURE < 80 MM HG: ICD-10-PCS | Mod: CPTII,S$GLB,, | Performed by: FAMILY MEDICINE

## 2022-11-17 PROCEDURE — 99214 PR OFFICE/OUTPT VISIT, EST, LEVL IV, 30-39 MIN: ICD-10-PCS | Mod: S$GLB,,, | Performed by: FAMILY MEDICINE

## 2022-11-17 PROCEDURE — 1160F RVW MEDS BY RX/DR IN RCRD: CPT | Mod: CPTII,S$GLB,, | Performed by: FAMILY MEDICINE

## 2022-11-17 PROCEDURE — 3074F PR MOST RECENT SYSTOLIC BLOOD PRESSURE < 130 MM HG: ICD-10-PCS | Mod: CPTII,S$GLB,, | Performed by: FAMILY MEDICINE

## 2022-11-17 RX ORDER — ONDANSETRON 4 MG/1
4 TABLET, ORALLY DISINTEGRATING ORAL EVERY 8 HOURS PRN
Qty: 30 TABLET | Refills: 0 | Status: SHIPPED | OUTPATIENT
Start: 2022-11-17 | End: 2023-10-23

## 2022-11-17 RX ORDER — FUROSEMIDE 20 MG/1
20 TABLET ORAL DAILY
Qty: 30 TABLET | Refills: 2 | Status: SHIPPED | OUTPATIENT
Start: 2022-11-17 | End: 2022-12-21 | Stop reason: SDUPTHER

## 2022-11-17 RX ORDER — BISOPROLOL FUMARATE 5 MG/1
5 TABLET, FILM COATED ORAL DAILY
Qty: 30 TABLET | Refills: 2 | Status: SHIPPED | OUTPATIENT
Start: 2022-11-17 | End: 2023-05-24 | Stop reason: SDUPTHER

## 2022-11-17 RX ORDER — DOCUSATE CALCIUM 240 MG
240 CAPSULE ORAL DAILY
COMMUNITY
End: 2023-09-22 | Stop reason: ALTCHOICE

## 2022-11-17 NOTE — TELEPHONE ENCOUNTER
----- Message from Malcolm Bryant MA sent at 11/17/2022  1:02 PM CST -----  Regarding: 3month f/u w/ Dr. Sun  [phone] Pt was told to f/u in 3 months she ask to call her when the appointment is made to confirm  also needs late evening appt.

## 2022-11-17 NOTE — PROGRESS NOTES
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  10 - 44 U/L Final    Anion Gap 06/15/2022 8  8 - 16 mmol/L Final    eGFR if African American 06/15/2022 >60  >60 mL/min/1.73 m^2 Final    eGFR if non African American 06/15/2022 >60  >60 mL/min/1.73 m^2 Final    LD 06/15/2022 246  110 - 260 U/L Final    Iron 06/15/2022 64  30 - 160 ug/dL Final    Transferrin 06/15/2022 413 (H)  200 - 375 mg/dL Final    TIBC 06/15/2022 611 (H)  250 - 450 ug/dL Final    Saturated Iron 06/15/2022 10 (L)  20 - 50 % Final    Ferritin 06/15/2022 23  20.0 - 300.0 ng/mL Final    Folate 06/15/2022 10.7  4.0 - 24.0 ng/mL Final    Vitamin B-12 06/15/2022 992 (H)  210 - 950 pg/mL Final    Protein, Serum 06/15/2022 7.5  6.0 - 8.4 g/dL Final    Albumin 06/15/2022 3.62  3.35 - 5.55 g/dL Final    Alpha-1 06/15/2022 0.32  0.17 - 0.41 g/dL Final    Alpha-2 06/15/2022 0.56  0.43 - 0.99 g/dL Final    Beta 06/15/2022 0.95  0.50 - 1.10 g/dL Final    Gamma 06/15/2022 2.06 (H)  0.67 - 1.58 g/dL Final    HIV 1/2 Ag/Ab 06/15/2022 Negative  Negative Final    Hepatitis B Surface Ag 06/15/2022 Positive (A)  Negative Final    Hep B C IgM 06/15/2022 Grayzone (A)  Negative Final    Hep A IgM 06/15/2022 Negative  Negative Final    Hepatitis C Ab 06/15/2022 SEE COMMENT  Negative Final    Haptoglobin 06/15/2022 70  30 - 250 mg/dL Final    MICHAEL Screen 06/15/2022 Positive (A)  Negative <1:80 Final    Retic 06/15/2022 1.0   0.5 - 2.5 % Final    Sed Rate 06/15/2022 33  0 - 36 mm/Hr Final    CRP 06/15/2022 3.1  0.0 - 8.2 mg/L Final    WBC 06/15/2022 2.02 (L)  3.90 - 12.70 K/uL Final    RBC 06/15/2022 3.56 (L)  4.00 - 5.40 M/uL Final    Hemoglobin 06/15/2022 10.3 (L)  12.0 - 16.0 g/dL Final    Hematocrit 06/15/2022 32.3 (L)  37.0 - 48.5 % Final    MCV 06/15/2022 91  82 - 98 fL Final    MCH 06/15/2022 28.9  27.0 - 31.0 pg Final    MCHC 06/15/2022 31.9 (L)  32.0 - 36.0 g/dL Final    RDW 06/15/2022 15.6 (H)  11.5 - 14.5 % Final    Platelets 06/15/2022 69 (L)  150 - 450 K/uL Final    MPV 06/15/2022 12.9  9.2 - 12.9 fL Final    Immature Granulocytes 06/15/2022 0.0  0.0 - 0.5 % Final    Gran # (ANC) 06/15/2022 1.1 (L)  1.8 - 7.7 K/uL Final    Immature Grans (Abs) 06/15/2022 0.00  0.00 - 0.04 K/uL Final    Lymph # 06/15/2022 0.7 (L)  1.0 - 4.8 K/uL Final    Mono # 06/15/2022 0.2 (L)  0.3 - 1.0 K/uL Final    Eos # 06/15/2022 0.0  0.0 - 0.5 K/uL Final    Baso # 06/15/2022 0.02  0.00 - 0.20 K/uL Final    nRBC 06/15/2022 0  0 /100 WBC Final    Gran % 06/15/2022 52.9  38.0 - 73.0 % Final    Lymph % 06/15/2022 34.7  18.0 - 48.0 % Final    Mono % 06/15/2022 9.9  4.0 - 15.0 % Final    Eosinophil % 06/15/2022 1.5  0.0 - 8.0 % Final    Basophil % 06/15/2022 1.0  0.0 - 1.9 % Final    Platelet Estimate 06/15/2022 Decreased (A)   Final    Differential Method 06/15/2022 Automated   Final    Direct Charlie 06/15/2022 NEG   Final    MICHAEL PATTERN 1 06/15/2022 Homogeneous   Final    Anti Sm Antibody 06/15/2022 0.78  0.00 - 0.99 Ratio Final    Anti-Sm Interpretation 06/15/2022 Negative  Negative Final    Anti-SSA Antibody 06/15/2022 0.79  0.00 - 0.99 Ratio Final    Anti-SSA Interpretation 06/15/2022 Negative  Negative Final    Anti-SSB Antibody 06/15/2022 0.48  0.00 - 0.99 Ratio Final    Anti-SSB Interpretation 06/15/2022 Negative  Negative Final    ds DNA Ab 06/15/2022 Negative 1:10  Negative 1:10 Final    Anti Sm/RNP Antibody 06/15/2022 0.70  0.00 - 0.99 Ratio  Final    Anti-Sm/RNP Interpretation 06/15/2022 Negative  Negative Final    MICHAEL Titer 1 06/15/2022 1:160   Final    HBsAg Confirmation 06/15/2022 Positive (A)  Negative Final    Pathologist Review Peripheral Smear 06/15/2022 REVIEWED   Final    Pathologist Interpretation SPE 06/15/2022 REVIEWED   Final   Admission on 06/03/2022, Discharged on 06/03/2022   Component Date Value Ref Range Status    WBC 06/03/2022 2.61 (L)  3.90 - 12.70 K/uL Final    RBC 06/03/2022 3.43 (L)  4.00 - 5.40 M/uL Final    Hemoglobin 06/03/2022 10.0 (L)  12.0 - 16.0 g/dL Final    Hematocrit 06/03/2022 29.8 (L)  37.0 - 48.5 % Final    MCV 06/03/2022 87  82 - 98 fL Final    MCH 06/03/2022 29.2  27.0 - 31.0 pg Final    MCHC 06/03/2022 33.6  32.0 - 36.0 g/dL Final    RDW 06/03/2022 15.9 (H)  11.5 - 14.5 % Final    Platelets 06/03/2022 64 (L)  150 - 450 K/uL Final    MPV 06/03/2022 13.3 (H)  9.2 - 12.9 fL Final    Immature Granulocytes 06/03/2022 0.4  0.0 - 0.5 % Final    Gran # (ANC) 06/03/2022 1.3 (L)  1.8 - 7.7 K/uL Final    Immature Grans (Abs) 06/03/2022 0.01  0.00 - 0.04 K/uL Final    Lymph # 06/03/2022 1.0  1.0 - 4.8 K/uL Final    Mono # 06/03/2022 0.2 (L)  0.3 - 1.0 K/uL Final    Eos # 06/03/2022 0.1  0.0 - 0.5 K/uL Final    Baso # 06/03/2022 0.03  0.00 - 0.20 K/uL Final    nRBC 06/03/2022 0  0 /100 WBC Final    Gran % 06/03/2022 48.7  38.0 - 73.0 % Final    Lymph % 06/03/2022 36.4  18.0 - 48.0 % Final    Mono % 06/03/2022 9.2  4.0 - 15.0 % Final    Eosinophil % 06/03/2022 4.2  0.0 - 8.0 % Final    Basophil % 06/03/2022 1.1  0.0 - 1.9 % Final    Platelet Estimate 06/03/2022 Decreased (A)   Final    Differential Method 06/03/2022 Automated   Final    Sodium 06/03/2022 140  136 - 145 mmol/L Final    Potassium 06/03/2022 3.8  3.5 - 5.1 mmol/L Final    Chloride 06/03/2022 112 (H)  95 - 110 mmol/L Final    CO2 06/03/2022 25  22 - 31 mmol/L Final    Glucose 06/03/2022 92  70 - 110 mg/dL Final    BUN 06/03/2022 10  7 - 18 mg/dL Final    Creatinine  06/03/2022 0.55  0.50 - 1.40 mg/dL Final    Calcium 06/03/2022 8.3 (L)  8.4 - 10.2 mg/dL Final    Total Protein 06/03/2022 7.5  6.0 - 8.4 g/dL Final    Albumin 06/03/2022 3.5  3.5 - 5.2 g/dL Final    Total Bilirubin 06/03/2022 0.8  0.2 - 1.3 mg/dL Final    Alkaline Phosphatase 06/03/2022 164 (H)  38 - 145 U/L Final    AST 06/03/2022 107 (H)  14 - 36 U/L Final    ALT 06/03/2022 109 (H)  0 - 35 U/L Final    Anion Gap 06/03/2022 3 (L)  8 - 16 mmol/L Final    eGFR if African American 06/03/2022 >60  >60 mL/min/1.73 m^2 Final    eGFR if non African American 06/03/2022 >60  >60 mL/min/1.73 m^2 Final    Specimen UA 06/03/2022 Urine, Clean Catch   Final    Color, UA 06/03/2022 Yellow  Yellow, Straw, Allyson Final    Appearance, UA 06/03/2022 Clear  Clear Final    pH, UA 06/03/2022 6.5  5.0 - 8.0 Final    Specific Gravity, UA 06/03/2022 1.010  1.005 - 1.030 Final    Protein, UA 06/03/2022 Negative  Negative Final    Glucose, UA 06/03/2022 Negative  Negative Final    Ketones, UA 06/03/2022 Negative  Negative Final    Bilirubin (UA) 06/03/2022 Negative  Negative Final    Occult Blood UA 06/03/2022 Trace (A)  Negative Final    Nitrite, UA 06/03/2022 Negative  Negative Final    Urobilinogen, UA 06/03/2022 0.2  <2.0 EU/dL Final    Leukocytes, UA 06/03/2022 Negative  Negative Final    Lipase Result 06/03/2022 182  23 - 300 U/L Final    Acetaminophen (Tylenol), Serum 06/03/2022 <10.0  10.0 - 20.0 ug/mL Final       Past Medical History:   Diagnosis Date    Bone spur     hip    HTN (hypertension)      Social History     Socioeconomic History    Marital status:    Tobacco Use    Smoking status: Every Day     Packs/day: 1.00     Types: Cigarettes    Smokeless tobacco: Never   Substance and Sexual Activity    Alcohol use: Not Currently    Drug use: Not Currently     Past Surgical History:   Procedure Laterality Date    CHOLECYSTECTOMY      HYSTERECTOMY       History reviewed. No pertinent family history.    Review of patient's  allergies indicates:   Allergen Reactions    Pcn [penicillins] Hives    Morphine Other (See Comments)     heartburn    Bupropion hcl Other (See Comments)    Demerol [meperidine]        Current Outpatient Medications:     diazePAM (VALIUM) 10 MG Tab, Take 1 tablet (10 mg total) by mouth 3 (three) times daily., Disp: 90 tablet, Rfl: 0    diclofenac sodium (VOLTAREN) 1 % Gel, Apply 2 g topically 4 (four) times daily., Disp: 100 g, Rfl: 0    docusate calcium (SURFAK) 240 mg capsule, Take 240 mg by mouth once daily., Disp: , Rfl:     EScitalopram oxalate (LEXAPRO) 10 MG tablet, Take 10 mg by mouth once daily., Disp: , Rfl:     EScitalopram oxalate (LEXAPRO) 5 MG Tab, Take 5 mg by mouth once daily., Disp: , Rfl:     gabapentin (NEURONTIN) 300 MG capsule, Take 1 capsule (300 mg total) by mouth 2 (two) times daily., Disp: 60 capsule, Rfl: 2    lisinopriL 10 MG tablet, Take 1 tablet (10 mg total) by mouth once daily., Disp: 30 tablet, Rfl: 2    naloxone (NARCAN) 4 mg/actuation Spry, each nostril if not breathing or not responsive, Disp: 1 each, Rfl: 2    oxyCODONE (ROXICODONE) 15 MG Tab, Take 1 tablet (15 mg total) by mouth every 6 (six) hours as needed for Pain., Disp: 100 tablet, Rfl: 0    risperiDONE (RISPERDAL) 0.5 MG Tab, Take by mouth. 1.5 mg along with 1 mg daily, Disp: , Rfl:     risperiDONE (RISPERDAL) 1 MG tablet, Take 1 mg by mouth 2 (two) times daily., Disp: , Rfl:     senna (SENOKOT) 8.6 mg tablet, Take 1 tablet by mouth daily as needed for Constipation., Disp: 30 tablet, Rfl: 2    spironolactone (ALDACTONE) 100 MG tablet, Take 1 tablet (100 mg total) by mouth once daily., Disp: 30 tablet, Rfl: 2    VEMLIDY 25 mg Tab, Take 1 tablet (25 mg total) by mouth once daily., Disp: 30 tablet, Rfl: 11    bisoprolol (ZEBETA) 5 MG tablet, Take 1 tablet (5 mg total) by mouth once daily., Disp: 30 tablet, Rfl: 2    furosemide (LASIX) 20 MG tablet, Take 1 tablet (20 mg total) by mouth once daily., Disp: 30 tablet, Rfl: 2     "ondansetron (ZOFRAN-ODT) 4 MG TbDL, Take 1 tablet (4 mg total) by mouth every 8 (eight) hours as needed (nausea)., Disp: 30 tablet, Rfl: 0    Review of Systems   Constitutional:  Negative for appetite change, fatigue, fever and unexpected weight change.   Respiratory:  Negative for cough, chest tightness, shortness of breath and wheezing.    Cardiovascular:  Positive for leg swelling. Negative for chest pain.   Gastrointestinal:  Negative for abdominal pain, constipation, nausea and vomiting.        -heartburn, bloating   Genitourinary:  Negative for difficulty urinating, dysuria, frequency and urgency.   Musculoskeletal:  Positive for arthralgias and back pain. Negative for myalgias and neck pain.   Skin:  Negative for rash.   Neurological:  Negative for dizziness, weakness, numbness and headaches.   Hematological:  Does not bruise/bleed easily.   Psychiatric/Behavioral:  Positive for sleep disturbance. Negative for dysphoric mood and suicidal ideas. The patient is nervous/anxious.    All other systems reviewed and are negative.       Objective:      Vitals:    11/17/22 1123   BP: 90/60   Pulse: 74   SpO2: 96%   Weight: 93.4 kg (206 lb)   Height: 5' 11" (1.803 m)     Wt Readings from Last 6 Encounters:   11/17/22 93.4 kg (206 lb)   10/24/22 89.3 kg (196 lb 13.9 oz)   10/19/22 90.7 kg (200 lb)   10/07/22 100.2 kg (220 lb 14.4 oz)   08/27/22 100.2 kg (220 lb 14.4 oz)   08/24/22 100.2 kg (221 lb)         Physical Exam  Vitals reviewed.   Constitutional:       General: She is not in acute distress.     Appearance: Normal appearance. She is well-developed.      Comments: overweight   HENT:      Head: Normocephalic and atraumatic.   Neck:      Thyroid: No thyromegaly.   Cardiovascular:      Rate and Rhythm: Normal rate and regular rhythm.      Heart sounds: Murmur heard.   Systolic murmur is present with a grade of 2/6.     No friction rub.   Pulmonary:      Effort: Pulmonary effort is normal.      Breath sounds: Normal " breath sounds. No wheezing or rales.   Abdominal:      General: There is no distension.      Palpations: Abdomen is soft.      Tenderness: There is no abdominal tenderness. There is no guarding or rebound.   Musculoskeletal:      Cervical back: Neck supple.      Right lower le+ Edema present.      Left lower le+ Pitting Edema present.   Lymphadenopathy:      Cervical: No cervical adenopathy.   Skin:     General: Skin is warm and dry.      Findings: No rash.   Neurological:      Mental Status: She is alert and oriented to person, place, and time.   Psychiatric:         Attention and Perception: She perceives visual hallucinations. She does not perceive auditory hallucinations.         Mood and Affect: Mood is depressed. Affect is tearful.         Speech: Speech normal.         Behavior: Behavior normal.         Thought Content: Thought content normal.         Cognition and Memory: Cognition is not impaired.         Judgment: Judgment normal.         Assessment:       1. Essential hypertension    2. Chronic hepatitis B    3. Cirrhosis of liver without ascites, unspecified hepatic cirrhosis type    4. Anxiety    5. Chronic pain syndrome    6. Encounter for screening mammogram for malignant neoplasm of breast         Plan:       Essential hypertension  Comments:  Controlled. Will continue to monitor BP on current medication regimen  Orders:  -     bisoprolol (ZEBETA) 5 MG tablet; Take 1 tablet (5 mg total) by mouth once daily.  Dispense: 30 tablet; Refill: 2    Chronic hepatitis B  Comments:  Controlled. To continue to follow w/ GI and Vemlidy    Cirrhosis of liver without ascites, unspecified hepatic cirrhosis type  Comments:  Stable. Will continue to monitor symptoms  Orders:  -     furosemide (LASIX) 20 MG tablet; Take 1 tablet (20 mg total) by mouth once daily.  Dispense: 30 tablet; Refill: 2  -     ondansetron (ZOFRAN-ODT) 4 MG TbDL; Take 1 tablet (4 mg total) by mouth every 8 (eight) hours as needed  (nausea).  Dispense: 30 tablet; Refill: 0    Anxiety  Comments:  Stable. Will continue to monitor symptoms. Pt advised to f/u with [facility]    Chronic pain syndrome  Comments:  Pain controlled. Will continue to monitor pain control and function    Encounter for screening mammogram for malignant neoplasm of breast  Comments:  Mammogram order sent to [facility]  Orders:  -     Mammo Digital Screening Bilat w/ Tony; Future; Expected date: 11/17/2022    Labs and/or tests have been ordered for the evaluation/monitoring of acute/chronic conditions, to be done just before next visit.    Chronic Pain Notes:  Have discussed the risks and benefits of chronic narcotic therapy including pain control, dependence, and addiction.  The patient is aware and accepts the risks and benefits. Patient is aware of the risk of taking narcotics combined with benzodiazepines/muscle relaxers/hypnotics, including but not limited to breathing difficulties, coma, and death; accepts the risks; and agrees to use medications only as prescribed.    Follow up in about 3 months (around 2/17/2023) for HTN, Anxiety.        11/17/2022 Marleen Sun

## 2022-11-23 ENCOUNTER — OFFICE VISIT (OUTPATIENT)
Dept: HEPATOLOGY | Facility: CLINIC | Age: 47
End: 2022-11-23
Payer: MEDICAID

## 2022-11-23 ENCOUNTER — SPECIALTY PHARMACY (OUTPATIENT)
Dept: PHARMACY | Facility: CLINIC | Age: 47
End: 2022-11-23
Payer: MEDICAID

## 2022-11-23 ENCOUNTER — TELEPHONE (OUTPATIENT)
Dept: HEMATOLOGY/ONCOLOGY | Facility: CLINIC | Age: 47
End: 2022-11-23
Payer: MEDICAID

## 2022-11-23 ENCOUNTER — LAB VISIT (OUTPATIENT)
Dept: LAB | Facility: HOSPITAL | Age: 47
End: 2022-11-23
Attending: INTERNAL MEDICINE
Payer: MEDICAID

## 2022-11-23 ENCOUNTER — TELEPHONE (OUTPATIENT)
Dept: HEPATOLOGY | Facility: CLINIC | Age: 47
End: 2022-11-23
Payer: MEDICAID

## 2022-11-23 VITALS
RESPIRATION RATE: 18 BRPM | HEART RATE: 80 BPM | OXYGEN SATURATION: 100 % | WEIGHT: 204.56 LBS | HEIGHT: 71 IN | TEMPERATURE: 97 F | BODY MASS INDEX: 28.64 KG/M2 | DIASTOLIC BLOOD PRESSURE: 62 MMHG | SYSTOLIC BLOOD PRESSURE: 122 MMHG

## 2022-11-23 DIAGNOSIS — B18.1 CHRONIC HEPATITIS B: Primary | ICD-10-CM

## 2022-11-23 DIAGNOSIS — B18.1 CHRONIC VIRAL HEPATITIS B WITHOUT DELTA AGENT AND WITHOUT COMA: Primary | ICD-10-CM

## 2022-11-23 DIAGNOSIS — B18.1 CHRONIC VIRAL HEPATITIS B WITHOUT DELTA AGENT AND WITHOUT COMA: ICD-10-CM

## 2022-11-23 LAB
ALBUMIN SERPL BCP-MCNC: 2.9 G/DL (ref 3.5–5.2)
ALP SERPL-CCNC: 147 U/L (ref 55–135)
ALT SERPL W/O P-5'-P-CCNC: 61 U/L (ref 10–44)
ANION GAP SERPL CALC-SCNC: 6 MMOL/L (ref 8–16)
AST SERPL-CCNC: 44 U/L (ref 10–40)
BILIRUB SERPL-MCNC: 0.4 MG/DL (ref 0.1–1)
BUN SERPL-MCNC: 15 MG/DL (ref 6–20)
CALCIUM SERPL-MCNC: 8.9 MG/DL (ref 8.7–10.5)
CHLORIDE SERPL-SCNC: 106 MMOL/L (ref 95–110)
CO2 SERPL-SCNC: 22 MMOL/L (ref 23–29)
CREAT SERPL-MCNC: 1.1 MG/DL (ref 0.5–1.4)
ERYTHROCYTE [DISTWIDTH] IN BLOOD BY AUTOMATED COUNT: 14.9 % (ref 11.5–14.5)
EST. GFR  (NO RACE VARIABLE): >60 ML/MIN/1.73 M^2
GLUCOSE SERPL-MCNC: 197 MG/DL (ref 70–110)
HBSAG CONFIRMATION: ABNORMAL
HBV SURFACE AG SERPL QL IA: REACTIVE
HCT VFR BLD AUTO: 27.3 % (ref 37–48.5)
HGB BLD-MCNC: 8.6 G/DL (ref 12–16)
INR PPP: 1.1 (ref 0.8–1.2)
MCH RBC QN AUTO: 29.9 PG (ref 27–31)
MCHC RBC AUTO-ENTMCNC: 31.5 G/DL (ref 32–36)
MCV RBC AUTO: 95 FL (ref 82–98)
PLATELET # BLD AUTO: 53 K/UL (ref 150–450)
PMV BLD AUTO: 12 FL (ref 9.2–12.9)
POTASSIUM SERPL-SCNC: 4.4 MMOL/L (ref 3.5–5.1)
PROT SERPL-MCNC: 6.9 G/DL (ref 6–8.4)
PROTHROMBIN TIME: 11.4 SEC (ref 9–12.5)
RBC # BLD AUTO: 2.88 M/UL (ref 4–5.4)
SODIUM SERPL-SCNC: 134 MMOL/L (ref 136–145)
VIT B12 SERPL-MCNC: 1668 PG/ML (ref 210–950)
WBC # BLD AUTO: 1.52 K/UL (ref 3.9–12.7)

## 2022-11-23 PROCEDURE — 87517 HEPATITIS B DNA QUANT: CPT | Performed by: INTERNAL MEDICINE

## 2022-11-23 PROCEDURE — 82607 VITAMIN B-12: CPT | Performed by: INTERNAL MEDICINE

## 2022-11-23 PROCEDURE — 99215 OFFICE O/P EST HI 40 MIN: CPT | Mod: S$PBB,,, | Performed by: INTERNAL MEDICINE

## 2022-11-23 PROCEDURE — 99999 PR PBB SHADOW E&M-EST. PATIENT-LVL IV: CPT | Mod: PBBFAC,,, | Performed by: INTERNAL MEDICINE

## 2022-11-23 PROCEDURE — 36415 COLL VENOUS BLD VENIPUNCTURE: CPT | Performed by: INTERNAL MEDICINE

## 2022-11-23 PROCEDURE — 3078F DIAST BP <80 MM HG: CPT | Mod: CPTII,,, | Performed by: INTERNAL MEDICINE

## 2022-11-23 PROCEDURE — 85610 PROTHROMBIN TIME: CPT | Performed by: INTERNAL MEDICINE

## 2022-11-23 PROCEDURE — 3066F NEPHROPATHY DOC TX: CPT | Mod: CPTII,,, | Performed by: INTERNAL MEDICINE

## 2022-11-23 PROCEDURE — 1159F MED LIST DOCD IN RCRD: CPT | Mod: CPTII,,, | Performed by: INTERNAL MEDICINE

## 2022-11-23 PROCEDURE — 3066F PR DOCUMENTATION OF TREATMENT FOR NEPHROPATHY: ICD-10-PCS | Mod: CPTII,,, | Performed by: INTERNAL MEDICINE

## 2022-11-23 PROCEDURE — 99214 OFFICE O/P EST MOD 30 MIN: CPT | Mod: PBBFAC | Performed by: INTERNAL MEDICINE

## 2022-11-23 PROCEDURE — 99999 PR PBB SHADOW E&M-EST. PATIENT-LVL IV: ICD-10-PCS | Mod: PBBFAC,,, | Performed by: INTERNAL MEDICINE

## 2022-11-23 PROCEDURE — 4010F ACE/ARB THERAPY RXD/TAKEN: CPT | Mod: CPTII,,, | Performed by: INTERNAL MEDICINE

## 2022-11-23 PROCEDURE — 3078F PR MOST RECENT DIASTOLIC BLOOD PRESSURE < 80 MM HG: ICD-10-PCS | Mod: CPTII,,, | Performed by: INTERNAL MEDICINE

## 2022-11-23 PROCEDURE — 1159F PR MEDICATION LIST DOCUMENTED IN MEDICAL RECORD: ICD-10-PCS | Mod: CPTII,,, | Performed by: INTERNAL MEDICINE

## 2022-11-23 PROCEDURE — 3074F PR MOST RECENT SYSTOLIC BLOOD PRESSURE < 130 MM HG: ICD-10-PCS | Mod: CPTII,,, | Performed by: INTERNAL MEDICINE

## 2022-11-23 PROCEDURE — 3061F PR NEG MICROALBUMINURIA RESULT DOCUMENTED/REVIEW: ICD-10-PCS | Mod: CPTII,,, | Performed by: INTERNAL MEDICINE

## 2022-11-23 PROCEDURE — 3008F BODY MASS INDEX DOCD: CPT | Mod: CPTII,,, | Performed by: INTERNAL MEDICINE

## 2022-11-23 PROCEDURE — 80053 COMPREHEN METABOLIC PANEL: CPT | Performed by: INTERNAL MEDICINE

## 2022-11-23 PROCEDURE — 3061F NEG MICROALBUMINURIA REV: CPT | Mod: CPTII,,, | Performed by: INTERNAL MEDICINE

## 2022-11-23 PROCEDURE — 4010F PR ACE/ARB THEARPY RXD/TAKEN: ICD-10-PCS | Mod: CPTII,,, | Performed by: INTERNAL MEDICINE

## 2022-11-23 PROCEDURE — 3008F PR BODY MASS INDEX (BMI) DOCUMENTED: ICD-10-PCS | Mod: CPTII,,, | Performed by: INTERNAL MEDICINE

## 2022-11-23 PROCEDURE — 85027 COMPLETE CBC AUTOMATED: CPT | Performed by: INTERNAL MEDICINE

## 2022-11-23 PROCEDURE — 3074F SYST BP LT 130 MM HG: CPT | Mod: CPTII,,, | Performed by: INTERNAL MEDICINE

## 2022-11-23 PROCEDURE — 87340 HEPATITIS B SURFACE AG IA: CPT | Performed by: INTERNAL MEDICINE

## 2022-11-23 PROCEDURE — 86382 NEUTRALIZATION TEST VIRAL: CPT | Performed by: INTERNAL MEDICINE

## 2022-11-23 PROCEDURE — 99215 PR OFFICE/OUTPT VISIT, EST, LEVL V, 40-54 MIN: ICD-10-PCS | Mod: S$PBB,,, | Performed by: INTERNAL MEDICINE

## 2022-11-23 NOTE — TELEPHONE ENCOUNTER
----- Message from Henry Lloyd sent at 11/23/2022  9:12 AM CST -----  Regarding: Late to Appt  Pt called stating that she's going to be an hour late to her 9 am appt. She wants to know if she can still come to appt.    Contact Marie @[phone]

## 2022-11-23 NOTE — TELEPHONE ENCOUNTER
Incoming call from [facility] pt is at their pharmacy and requesting refill of Vemlidy. [facility] confirmed they have med in stock to dispense medication to pt today. Pt filling at [facility] for this fill but would like to continue with [facility]. Refill call updated accordingly and explained we will call her for her next fill.
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
## 2022-11-23 NOTE — TELEPHONE ENCOUNTER
"Spoke to patient regarding requested time for labs to be drawn. Patient verbalized understanding of date/time/location. Wished patient a Happy Thanksgiving. Patient verbalized appreciation.    ----- Message from Roseline Nayak sent at 11/23/2022  2:18 PM CST -----  Regarding: Labs  Contact: Marie  Consult/Advisory:       Name Of Caller: Marie      Contact Preference?: [phone]         Does patient feel the need to be seen today? No      What is the nature of the call?: Calling to reschedule lab work. Pt would like to have have labs on 11/25/22.       Additional Notes:  "Thank you for all that you do for our patients'"      "

## 2022-11-28 ENCOUNTER — TELEPHONE (OUTPATIENT)
Dept: HEMATOLOGY/ONCOLOGY | Facility: CLINIC | Age: 47
End: 2022-11-28
Payer: MEDICAID

## 2022-11-28 ENCOUNTER — TELEPHONE (OUTPATIENT)
Dept: FAMILY MEDICINE | Facility: CLINIC | Age: 47
End: 2022-11-28

## 2022-11-28 DIAGNOSIS — G89.4 CHRONIC PAIN SYNDROME: ICD-10-CM

## 2022-11-28 LAB
HBV DNA SERPL NAA+PROBE-ACNC: ABNORMAL IU/ML
HBV DNA SERPL NAA+PROBE-LOG IU: 4.94 LOG (10) IU/ML
HBV DNA SERPL QL NAA+PROBE: DETECTED

## 2022-11-28 NOTE — TELEPHONE ENCOUNTER
----- Message from Chyna Luu MA sent at 11/28/2022  9:45 AM CST -----  Regarding: pain med refill  Pt calling for a script of only 20 pain pills because she is leaving to go to [place]. [phone]

## 2022-11-28 NOTE — TELEPHONE ENCOUNTER
----- Message from Subha Sheridan sent at 11/28/2022  9:06 AM CST -----  Type: Need Medical Advice  Who Called: Marie Lugo callback number: [phone]  Additional Info: Patient called to find out if the blood work she had drawn last week at [facility] in [place] would be okay to use for tomorrow's visit   Please call to further assist, Thanks      Returned call to patient, informed her that no additional labs are needed prior to her appt tomorrow. Confirmed appt date and time of 11/29 at 11:40 am.

## 2022-11-29 ENCOUNTER — OFFICE VISIT (OUTPATIENT)
Dept: HEMATOLOGY/ONCOLOGY | Facility: CLINIC | Age: 47
End: 2022-11-29
Payer: MEDICAID

## 2022-11-29 VITALS
SYSTOLIC BLOOD PRESSURE: 99 MMHG | DIASTOLIC BLOOD PRESSURE: 65 MMHG | HEART RATE: 72 BPM | TEMPERATURE: 98 F | RESPIRATION RATE: 16 BRPM | HEIGHT: 69 IN | OXYGEN SATURATION: 98 % | BODY MASS INDEX: 29.74 KG/M2 | WEIGHT: 200.81 LBS

## 2022-11-29 DIAGNOSIS — G89.4 CHRONIC PAIN SYNDROME: ICD-10-CM

## 2022-11-29 DIAGNOSIS — D61.818 PANCYTOPENIA: ICD-10-CM

## 2022-11-29 DIAGNOSIS — K74.60 CIRRHOSIS OF LIVER WITHOUT ASCITES, UNSPECIFIED HEPATIC CIRRHOSIS TYPE: Primary | ICD-10-CM

## 2022-11-29 DIAGNOSIS — D64.9 ANEMIA, UNSPECIFIED TYPE: ICD-10-CM

## 2022-11-29 DIAGNOSIS — R60.1 ANASARCA: ICD-10-CM

## 2022-11-29 DIAGNOSIS — D70.9 NEUTROPENIA, UNSPECIFIED TYPE: ICD-10-CM

## 2022-11-29 DIAGNOSIS — B18.1 CHRONIC HEPATITIS B: ICD-10-CM

## 2022-11-29 DIAGNOSIS — R76.8 HEPATITIS A ANTIBODY POSITIVE: ICD-10-CM

## 2022-11-29 PROCEDURE — 3061F NEG MICROALBUMINURIA REV: CPT | Mod: CPTII,,, | Performed by: STUDENT IN AN ORGANIZED HEALTH CARE EDUCATION/TRAINING PROGRAM

## 2022-11-29 PROCEDURE — 4010F ACE/ARB THERAPY RXD/TAKEN: CPT | Mod: CPTII,,, | Performed by: STUDENT IN AN ORGANIZED HEALTH CARE EDUCATION/TRAINING PROGRAM

## 2022-11-29 PROCEDURE — 3066F PR DOCUMENTATION OF TREATMENT FOR NEPHROPATHY: ICD-10-PCS | Mod: CPTII,,, | Performed by: STUDENT IN AN ORGANIZED HEALTH CARE EDUCATION/TRAINING PROGRAM

## 2022-11-29 PROCEDURE — 99999 PR PBB SHADOW E&M-EST. PATIENT-LVL IV: ICD-10-PCS | Mod: PBBFAC,,, | Performed by: STUDENT IN AN ORGANIZED HEALTH CARE EDUCATION/TRAINING PROGRAM

## 2022-11-29 PROCEDURE — 3066F NEPHROPATHY DOC TX: CPT | Mod: CPTII,,, | Performed by: STUDENT IN AN ORGANIZED HEALTH CARE EDUCATION/TRAINING PROGRAM

## 2022-11-29 PROCEDURE — 99215 PR OFFICE/OUTPT VISIT, EST, LEVL V, 40-54 MIN: ICD-10-PCS | Mod: S$PBB,,, | Performed by: STUDENT IN AN ORGANIZED HEALTH CARE EDUCATION/TRAINING PROGRAM

## 2022-11-29 PROCEDURE — 99214 OFFICE O/P EST MOD 30 MIN: CPT | Mod: PBBFAC,PN | Performed by: STUDENT IN AN ORGANIZED HEALTH CARE EDUCATION/TRAINING PROGRAM

## 2022-11-29 PROCEDURE — 3078F PR MOST RECENT DIASTOLIC BLOOD PRESSURE < 80 MM HG: ICD-10-PCS | Mod: CPTII,,, | Performed by: STUDENT IN AN ORGANIZED HEALTH CARE EDUCATION/TRAINING PROGRAM

## 2022-11-29 PROCEDURE — 3061F PR NEG MICROALBUMINURIA RESULT DOCUMENTED/REVIEW: ICD-10-PCS | Mod: CPTII,,, | Performed by: STUDENT IN AN ORGANIZED HEALTH CARE EDUCATION/TRAINING PROGRAM

## 2022-11-29 PROCEDURE — 3074F PR MOST RECENT SYSTOLIC BLOOD PRESSURE < 130 MM HG: ICD-10-PCS | Mod: CPTII,,, | Performed by: STUDENT IN AN ORGANIZED HEALTH CARE EDUCATION/TRAINING PROGRAM

## 2022-11-29 PROCEDURE — 3008F PR BODY MASS INDEX (BMI) DOCUMENTED: ICD-10-PCS | Mod: CPTII,,, | Performed by: STUDENT IN AN ORGANIZED HEALTH CARE EDUCATION/TRAINING PROGRAM

## 2022-11-29 PROCEDURE — 99999 PR PBB SHADOW E&M-EST. PATIENT-LVL IV: CPT | Mod: PBBFAC,,, | Performed by: STUDENT IN AN ORGANIZED HEALTH CARE EDUCATION/TRAINING PROGRAM

## 2022-11-29 PROCEDURE — 99215 OFFICE O/P EST HI 40 MIN: CPT | Mod: S$PBB,,, | Performed by: STUDENT IN AN ORGANIZED HEALTH CARE EDUCATION/TRAINING PROGRAM

## 2022-11-29 PROCEDURE — 4010F PR ACE/ARB THEARPY RXD/TAKEN: ICD-10-PCS | Mod: CPTII,,, | Performed by: STUDENT IN AN ORGANIZED HEALTH CARE EDUCATION/TRAINING PROGRAM

## 2022-11-29 PROCEDURE — 1159F PR MEDICATION LIST DOCUMENTED IN MEDICAL RECORD: ICD-10-PCS | Mod: CPTII,,, | Performed by: STUDENT IN AN ORGANIZED HEALTH CARE EDUCATION/TRAINING PROGRAM

## 2022-11-29 PROCEDURE — 1159F MED LIST DOCD IN RCRD: CPT | Mod: CPTII,,, | Performed by: STUDENT IN AN ORGANIZED HEALTH CARE EDUCATION/TRAINING PROGRAM

## 2022-11-29 PROCEDURE — 3078F DIAST BP <80 MM HG: CPT | Mod: CPTII,,, | Performed by: STUDENT IN AN ORGANIZED HEALTH CARE EDUCATION/TRAINING PROGRAM

## 2022-11-29 PROCEDURE — 1160F PR REVIEW ALL MEDS BY PRESCRIBER/CLIN PHARMACIST DOCUMENTED: ICD-10-PCS | Mod: CPTII,,, | Performed by: STUDENT IN AN ORGANIZED HEALTH CARE EDUCATION/TRAINING PROGRAM

## 2022-11-29 PROCEDURE — 3074F SYST BP LT 130 MM HG: CPT | Mod: CPTII,,, | Performed by: STUDENT IN AN ORGANIZED HEALTH CARE EDUCATION/TRAINING PROGRAM

## 2022-11-29 PROCEDURE — 3008F BODY MASS INDEX DOCD: CPT | Mod: CPTII,,, | Performed by: STUDENT IN AN ORGANIZED HEALTH CARE EDUCATION/TRAINING PROGRAM

## 2022-11-29 PROCEDURE — 1160F RVW MEDS BY RX/DR IN RCRD: CPT | Mod: CPTII,,, | Performed by: STUDENT IN AN ORGANIZED HEALTH CARE EDUCATION/TRAINING PROGRAM

## 2022-11-29 RX ORDER — OXYCODONE HYDROCHLORIDE 15 MG/1
15 TABLET ORAL EVERY 6 HOURS PRN
Qty: 120 TABLET | Refills: 0 | Status: SHIPPED | OUTPATIENT
Start: 2022-11-29 | End: 2022-12-29 | Stop reason: SDUPTHER

## 2022-11-29 RX ORDER — CLINDAMYCIN HYDROCHLORIDE 300 MG/1
CAPSULE ORAL
COMMUNITY
Start: 2022-11-22 | End: 2023-02-13

## 2022-11-29 RX ORDER — CYANOCOBALAMIN 1000 UG/ML
1000 INJECTION, SOLUTION INTRAMUSCULAR; SUBCUTANEOUS
Qty: 30 ML | Refills: 0 | Status: SHIPPED | OUTPATIENT
Start: 2022-11-29 | End: 2023-10-11

## 2022-11-29 NOTE — TELEPHONE ENCOUNTER
Reviewed , was only given #100 oxycodone on 11/4/22, which was incorrect. Should be #120. Will send to requested pharm

## 2022-11-29 NOTE — PROGRESS NOTES
"PATIENT: Marie Damon  MRN: [id number]  DATE: 12/12/2022      Diagnosis:   1. Cirrhosis of liver without ascites, unspecified hepatic cirrhosis type    2. Anemia, unspecified type    3. Chronic pain syndrome    4. Hepatitis A antibody positive    5. Anasarca    6. Chronic hepatitis B    7. Pancytopenia    8. Neutropenia, unspecified type      Chief Complaint: Follow-up and Pancytopenia    Subjective:   HPI: Ms. Damon is a 47 y.o. female with HTN, anxiety, DDD, chronic low back pain who is seen today at the request of MAYRA Slaughter for a diagnosis of thrombocytopenia.     Had multiple blood work in 6/2022 with significant findings of Hep B s antigen positive, positive MICHAEL, and thrombocytopenia. She presented with significant swelling of her lower ext bilateral as well as distension of her abdomen. Patient was referred to see hepatology; attempted to schedule patient for an alex on 7/12/22 with Dr Mason was not successful since patient phone got hacked " per patient". Patient wanted "something done" in the clinic, explain to her that she needs to see a hepatology and she needs lasix with possible paracentesis and the fastest way to get this done is to go to the ER. Patient state that lasix made her "sleepy". Admitted     Today, since her last visit, patient saw hepatology and started on vemlidy for hepatitis B,also admitted her self to the pschicatry unit and had a significant trauma .... and she is being having blood in the stool for which she is seeing GI 12/7/22 with scopes. Giancarlo SPRAGUE in  [facility], patient was tearful and requesting for me to call her PCP; Dr Sun to ask her if she can give her 20 tables of the 120 tables she usually gets .... we discussed blood work results     Hematological history:   6/3/22 she presented to the ER at [facility] for abdominal pain, nausea, and diarrhea.   CT A/P was done with the following findings: "The liver slightly decreased size and irregular contour indicating " "cirrhosis.  No discrete focal lesion.  Gallbladder removed.  Pancreas normal.  Stomach decompressed.  Spleen is severely enlarged measuring 17.5 cm across.  Numerous periportal, splenic hilar, mesenteric, omental collateral vessels.  Generalized hazy density throughout the mesentery more significant centrally.  Adrenal glands normal.  Kidneys normal size and contour with no hydronephrosis.  Aorta tapers normally.  No bowel obstruction, ascites, or significant lymphadenopathy seen.  Bladder and rectum normal.  Bones intact.  Lung bases clear."  Lab results show patient was anemic with Hgb 10.g/dL, low WBC at 2.61 and platelet count of 64k. Elevated Alk/Phos, AST, ALT. She was discharged with follow up referrals to GI and this office.   Was seen by GI at [facility] yesterday and was given Rx for Metronidazole that she has not yet started. No additional blood work was done at that visit. She is scheduled to go back in 2 weeks for follow up of results of stool studies.   No family history of hematologic cancers or blood disorders. Mother had thyroid cancer.   Personal history of cholecystectomy and hysterectomy.   She has not experienced recurrent infections or prolonged use of antibiotic.   Social history includes 33 pack year smoking history, minimal alcohol use. 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 She states her drug use last for a few years, she has not used illicit drugs in approximately 15 years. Denies high risk sexual activity.    Past Medical History:   Past Medical History:   Diagnosis Date    Bone spur     hip    HTN (hypertension)        Past Surgical HIstory:   Past Surgical History:   Procedure Laterality Date    CHOLECYSTECTOMY      HYSTERECTOMY         Family History: History reviewed. No pertinent family history.    Social History:  reports that she has been smoking cigarettes. She has been smoking an average of 1 pack per day. She has never used smokeless " tobacco. She reports that she does not currently use alcohol. She reports that she does not currently use drugs.    Allergies:  Review of patient's allergies indicates:   Allergen Reactions    Pcn [penicillins] Hives    Morphine Other (See Comments)     heartburn    Bupropion hcl Other (See Comments)    Demerol [meperidine]        Medications:  Current Outpatient Medications   Medication Sig Dispense Refill    bisoprolol (ZEBETA) 5 MG tablet Take 1 tablet (5 mg total) by mouth once daily. 30 tablet 2    clindamycin (CLEOCIN) 300 MG capsule TAKE ONE CAPSULE BY MOUTH THREE TIMES DAILY UNTIL complete      diazePAM (VALIUM) 10 MG Tab Take 1 tablet (10 mg total) by mouth 3 (three) times daily. 90 tablet 0    diclofenac sodium (VOLTAREN) 1 % Gel Apply 2 g topically 4 (four) times daily. 100 g 0    docusate calcium (SURFAK) 240 mg capsule Take 240 mg by mouth once daily.      EScitalopram oxalate (LEXAPRO) 10 MG tablet Take 10 mg by mouth once daily.      EScitalopram oxalate (LEXAPRO) 5 MG Tab Take 5 mg by mouth once daily.      furosemide (LASIX) 20 MG tablet Take 1 tablet (20 mg total) by mouth once daily. 30 tablet 2    lisinopriL 10 MG tablet Take 1 tablet (10 mg total) by mouth once daily. 30 tablet 2    naloxone (NARCAN) 4 mg/actuation Spry each nostril if not breathing or not responsive 1 each 2    ondansetron (ZOFRAN-ODT) 4 MG TbDL Take 1 tablet (4 mg total) by mouth every 8 (eight) hours as needed (nausea). 30 tablet 0    risperiDONE (RISPERDAL) 0.5 MG Tab Take by mouth. 1.5 mg along with 1 mg daily      risperiDONE (RISPERDAL) 1 MG tablet Take 1 mg by mouth 2 (two) times daily.      senna (SENOKOT) 8.6 mg tablet Take 1 tablet by mouth daily as needed for Constipation. 30 tablet 2    spironolactone (ALDACTONE) 100 MG tablet Take 1 tablet (100 mg total) by mouth once daily. 30 tablet 2    VEMLIDY 25 mg Tab Take 1 tablet (25 mg total) by mouth once daily. 30 tablet 11    cyanocobalamin 1,000 mcg/mL injection Inject  "1 mL (1,000 mcg total) into the muscle every 28 days. 30 mL 0    gabapentin (NEURONTIN) 300 MG capsule Take 1 capsule (300 mg total) by mouth 2 (two) times daily. 60 capsule 2    oxyCODONE (ROXICODONE) 15 MG Tab Take 1 tablet (15 mg total) by mouth every 6 (six) hours as needed for Pain. 120 tablet 0     No current facility-administered medications for this visit.       Review of Systems   Constitutional:  Positive for activity change, appetite change and fatigue. Negative for chills and fever.   HENT:  Negative for nosebleeds and trouble swallowing.    Respiratory:  Negative for cough and shortness of breath.    Cardiovascular:  Negative for chest pain, palpitations and leg swelling.   Gastrointestinal:  Positive for abdominal pain, diarrhea and nausea. Negative for abdominal distention, constipation and vomiting.   Genitourinary:  Negative for dysuria and hematuria.   Musculoskeletal:  Positive for back pain.   Skin:  Negative for pallor and rash.   Neurological:  Negative for light-headedness and headaches.   Hematological:  Negative for adenopathy. Does not bruise/bleed easily.   Psychiatric/Behavioral:  The patient is nervous/anxious.        Objective:      Vitals:   Vitals:    11/29/22 1117   BP: 99/65   BP Location: Left arm   Patient Position: Sitting   BP Method: Medium (Automatic)   Pulse: 72   Resp: 16   Temp: 97.7 °F (36.5 °C)   TempSrc: Temporal   SpO2: 98%   Weight: 91.1 kg (200 lb 13.4 oz)   Height: 5' 9" (1.753 m)     BMI: Body mass index is 29.66 kg/m².    Physical Exam  Vitals reviewed.   Constitutional:       General: She is not in acute distress.     Appearance: She is not diaphoretic.   HENT:      Head: Normocephalic and atraumatic.      Mouth/Throat:      Mouth: Mucous membranes are moist.   Eyes:      General: No scleral icterus.  Cardiovascular:      Rate and Rhythm: Normal rate and regular rhythm.      Pulses: Normal pulses.      Heart sounds: Normal heart sounds. No murmur " heard.  Pulmonary:      Effort: Pulmonary effort is normal. No respiratory distress.      Breath sounds: Normal breath sounds. No wheezing or rhonchi.   Abdominal:      General: Bowel sounds are normal. There is distension.      Palpations: Abdomen is soft. There is no mass.      Tenderness: There is no abdominal tenderness. There is no guarding or rebound.   Musculoskeletal:         General: Swelling and tenderness present.      Cervical back: No tenderness.   Lymphadenopathy:      Cervical: No cervical adenopathy.   Skin:     General: Skin is dry.      Coloration: Skin is not jaundiced.      Findings: No bruising or rash.   Neurological:      Mental Status: She is alert and oriented to person, place, and time.      Gait: Gait normal.   Psychiatric:         Mood and Affect: Mood normal.         Behavior: Behavior normal.       Laboratory Data:  Lab Results   Component Value Date    WBC 1.52 (LL) 11/23/2022    HGB 8.6 (L) 11/23/2022    HCT 27.3 (L) 11/23/2022    MCV 95 11/23/2022    PLT 53 (L) 11/23/2022      CMP  Sodium   Date Value Ref Range Status   11/23/2022 134 (L) 136 - 145 mmol/L Final     Potassium   Date Value Ref Range Status   11/23/2022 4.4 3.5 - 5.1 mmol/L Final     Chloride   Date Value Ref Range Status   11/23/2022 106 95 - 110 mmol/L Final     CO2   Date Value Ref Range Status   11/23/2022 22 (L) 23 - 29 mmol/L Final     Glucose   Date Value Ref Range Status   11/23/2022 197 (H) 70 - 110 mg/dL Final     BUN   Date Value Ref Range Status   11/23/2022 15 6 - 20 mg/dL Final     Creatinine   Date Value Ref Range Status   11/23/2022 1.1 0.5 - 1.4 mg/dL Final     Calcium   Date Value Ref Range Status   11/23/2022 8.9 8.7 - 10.5 mg/dL Final     Total Protein   Date Value Ref Range Status   11/23/2022 6.9 6.0 - 8.4 g/dL Final     Albumin   Date Value Ref Range Status   11/23/2022 2.9 (L) 3.5 - 5.2 g/dL Final     Total Bilirubin   Date Value Ref Range Status   11/23/2022 0.4 0.1 - 1.0 mg/dL Final      Comment:     For infants and newborns, interpretation of results should be based  on gestational age, weight and in agreement with clinical  observations.    Premature Infant recommended reference ranges:  Up to 24 hours.............<8.0 mg/dL  Up to 48 hours............<12.0 mg/dL  3-5 days..................<15.0 mg/dL  6-29 days.................<15.0 mg/dL       Alkaline Phosphatase   Date Value Ref Range Status   11/23/2022 147 (H) 55 - 135 U/L Final     AST   Date Value Ref Range Status   11/23/2022 44 (H) 10 - 40 U/L Final     ALT   Date Value Ref Range Status   11/23/2022 61 (H) 10 - 44 U/L Final     Anion Gap   Date Value Ref Range Status   11/23/2022 6 (L) 8 - 16 mmol/L Final     eGFR if    Date Value Ref Range Status   06/15/2022 >60 >60 mL/min/1.73 m^2 Final     eGFR if non    Date Value Ref Range Status   06/15/2022 >60 >60 mL/min/1.73 m^2 Final     Comment:     Calculation used to obtain the estimated glomerular filtration  rate (eGFR) is the CKD-EPI equation.            Imaging:   EXAMINATION:  CT ABDOMEN PELVIS WITHOUT CONTRAST     CLINICAL HISTORY:  Abdominal pain, acute, nonlocalized;     TECHNIQUE:  Low dose axial images, sagittal and coronal reformations were obtained from the lung bases to the pubic symphysis.  .  Oral contrast not given.  .  Automated exposure control used.     COMPARISON:  None     FINDINGS:  The liver slightly decreased size and irregular contour indicating cirrhosis.  No discrete focal lesion.  Gallbladder removed.  Pancreas normal.  Stomach decompressed.  Spleen is severely enlarged measuring 17.5 cm across.  Numerous periportal, splenic hilar, mesenteric, omental collateral vessels.  Generalized hazy density throughout the mesentery more significant centrally.  Adrenal glands normal.  Kidneys normal size and contour with no hydronephrosis.  Aorta tapers normally.  No bowel obstruction, ascites, or significant lymphadenopathy seen.   Bladder and rectum normal.  Bones intact.  Lung bases clear.     Impression:     Generalized mesenteric edema versus mesenteric adenitis.     Findings consistent with cirrhosis and portal hypertension including numerous collateral vessels throughout the abdomen and periportal region and severe splenomegaly.        Electronically signed by: Woody Mckinley MD  Date:                                            06/03/2022  Time:                                           16:06    Assessment:       1. Cirrhosis of liver without ascites, unspecified hepatic cirrhosis type    2. Anemia, unspecified type    3. Chronic pain syndrome    4. Hepatitis A antibody positive    5. Anasarca    6. Chronic hepatitis B    7. Pancytopenia    8. Neutropenia, unspecified type           Plan:     Pancytopenia likely due to acute hepatitis infection/ decompensation   - likely due to the below (cirrhosis, active viral infection) now with possible decompensation (GI bleeding; again)  - Hep B is improving, CBC was done without Diff ; unkown ANC, stable plts and Hb dropping, patient will see GI soon,  - explain to patient she needs to be seen and undergo scopes to make sure no GI bleeding/varicose/ulcer   - no need for blood transfusion, no infection, no fever      Cirrhosis/hepatitis B; anasarca   -CT A/P shows splenomegaly and findings consistent with cirrhosis  -Discussed CT results with patient and need for further work up with multiple blood tests  -Hx of IV drug use certainly puts her at risk for underlying viral cause , negative HIV, hepatitis panel positive for HepB surface and C IgM, and recd to get Hep B DNA PCR; given her the diagnosis of possible acute hepatitis B   - following up with  hepatology ; Dr Mason staff to rescheduling her as soon as possible    Chronic pain syndrome: pain medications per primary care.     Patient queried and all questions were answered.    Route Chart for Scheduling    Med Onc Chart  Routing      Follow up with physician 3 months. Blood work prior CBC/CMP   Follow up with ELISHA    Infusion scheduling note    Injection scheduling note    Labs    Imaging    Pharmacy appointment    Other referrals

## 2022-12-06 DIAGNOSIS — G89.4 CHRONIC PAIN SYNDROME: ICD-10-CM

## 2022-12-06 RX ORDER — GABAPENTIN 300 MG/1
300 CAPSULE ORAL 2 TIMES DAILY
Qty: 60 CAPSULE | Refills: 2 | Status: SHIPPED | OUTPATIENT
Start: 2022-12-06 | End: 2023-03-21 | Stop reason: SDUPTHER

## 2022-12-06 NOTE — TELEPHONE ENCOUNTER
----- Message from Trista Crespo sent at 12/6/2022  8:50 AM CST -----  Refill for gabapentin. [facility] in [place]. Pt #[phone]

## 2022-12-20 ENCOUNTER — PATIENT MESSAGE (OUTPATIENT)
Dept: PHARMACY | Facility: CLINIC | Age: 47
End: 2022-12-20
Payer: MEDICAID

## 2022-12-21 DIAGNOSIS — F41.9 ANXIETY: ICD-10-CM

## 2022-12-21 DIAGNOSIS — K74.60 CIRRHOSIS OF LIVER WITHOUT ASCITES, UNSPECIFIED HEPATIC CIRRHOSIS TYPE: ICD-10-CM

## 2022-12-21 RX ORDER — ESCITALOPRAM OXALATE 10 MG/1
10 TABLET ORAL DAILY
Qty: 30 TABLET | Refills: 2 | Status: SHIPPED | OUTPATIENT
Start: 2022-12-21 | End: 2023-05-15

## 2022-12-21 RX ORDER — FUROSEMIDE 20 MG/1
20 TABLET ORAL DAILY
Qty: 30 TABLET | Refills: 2 | Status: SHIPPED | OUTPATIENT
Start: 2022-12-21 | End: 2023-03-21 | Stop reason: SDUPTHER

## 2022-12-21 RX ORDER — DIAZEPAM 10 MG/1
10 TABLET ORAL 3 TIMES DAILY
Qty: 90 TABLET | Refills: 0 | Status: SHIPPED | OUTPATIENT
Start: 2022-12-21 | End: 2023-01-17 | Stop reason: SDUPTHER

## 2022-12-21 RX ORDER — RISPERIDONE 1 MG/1
1 TABLET ORAL 2 TIMES DAILY
Qty: 60 TABLET | Refills: 2 | Status: SHIPPED | OUTPATIENT
Start: 2022-12-21 | End: 2023-10-11

## 2022-12-21 RX ORDER — RISPERIDONE 0.5 MG/1
0.5 TABLET ORAL DAILY
Qty: 30 TABLET | Refills: 1 | Status: SHIPPED | OUTPATIENT
Start: 2022-12-21 | End: 2023-10-11

## 2022-12-21 NOTE — TELEPHONE ENCOUNTER
----- Message from Cathy Diaz sent at 12/21/2022 10:37 AM CST -----  Refill on: EScitalopram   Risperidone  Risperidone   Diazepam  Furosemide    [facility]     [phone]

## 2022-12-23 ENCOUNTER — SPECIALTY PHARMACY (OUTPATIENT)
Dept: PHARMACY | Facility: CLINIC | Age: 47
End: 2022-12-23
Payer: MEDICAID

## 2022-12-28 ENCOUNTER — PATIENT MESSAGE (OUTPATIENT)
Dept: PHARMACY | Facility: CLINIC | Age: 47
End: 2022-12-28
Payer: MEDICAID

## 2022-12-28 NOTE — TELEPHONE ENCOUNTER
Specialty Pharmacy - Refill Coordination    Specialty Medication Orders Linked to Encounter      Flowsheet Row Most Recent Value   Medication #1 VEMLIDY 25 mg Tab (Order#[id number], Rx#[id number])          Refill Questions - Documented Responses      Flowsheet Row Most Recent Value   Patient Availability and HIPAA Verification    Does patient want to proceed with activity? Unable to Reach          We have had multiple attempts to the patient and have been unsuccessful to reach the patient. We will stop reaching out to the patient but in the event that the patient needs the med and contacts us, we will communicate and begin dispensing for the patient. At your next visit with the patient, please review the importance of being in contact with our specialty pharmacy as a part of our care team.      [facility]  [address] [facility] [address]  Phone: [phone]  Fax: [phone]

## 2022-12-29 ENCOUNTER — TELEPHONE (OUTPATIENT)
Dept: HEMATOLOGY/ONCOLOGY | Facility: CLINIC | Age: 47
End: 2022-12-29
Payer: MEDICAID

## 2022-12-29 ENCOUNTER — PATIENT MESSAGE (OUTPATIENT)
Dept: HEMATOLOGY/ONCOLOGY | Facility: CLINIC | Age: 47
End: 2022-12-29
Payer: MEDICAID

## 2022-12-29 DIAGNOSIS — G89.4 CHRONIC PAIN SYNDROME: ICD-10-CM

## 2022-12-29 RX ORDER — OXYCODONE HYDROCHLORIDE 15 MG/1
15 TABLET ORAL EVERY 6 HOURS PRN
Qty: 120 TABLET | Refills: 0 | Status: SHIPPED | OUTPATIENT
Start: 2022-12-29 | End: 2023-01-24 | Stop reason: SDUPTHER

## 2022-12-29 NOTE — TELEPHONE ENCOUNTER
----- Message from Trista Crespo sent at 12/29/2022  8:38 AM CST -----  Refill for Oxycodone. [facility] in [place]. Pt #[phone]

## 2022-12-29 NOTE — TELEPHONE ENCOUNTER
Spoke with Dr. Bailon regarding pt's B12 injection. Dr. Bailon stated that pt needs to f/u PCP regarding the injections. Called pt and spoke with dtr and informed dtr that her mom needs to f/u with PCP regarding B12 injections. Dtr verbalied understanding. Spoke with pt regarding B12 injections and informed her that I will contact her after speaking with Dr. Bailon. Pt verbalized understanding. ----- Message from Subha Sheridan sent at 12/29/2022  9:47 AM CST -----  Type: Need Medical Advice  Who Called: Marie Lugo callback number:   Additional Info: Patient returned your call  Please call to further assist, Thanks

## 2022-12-29 NOTE — TELEPHONE ENCOUNTER
Receive message from schedulers regarding pt's B 12 injection. Called pt regarding the B 12 injections but no answer and could not leave a message.

## 2022-12-30 ENCOUNTER — TELEPHONE (OUTPATIENT)
Dept: FAMILY MEDICINE | Facility: CLINIC | Age: 47
End: 2022-12-30

## 2022-12-30 NOTE — TELEPHONE ENCOUNTER
----- Message from Carmen Nielsen LPN sent at 12/30/2022  8:46 AM CST -----    ----- Message -----  From: Cathy Diaz  Sent: 12/30/2022   8:33 AM CST  To: Marleen Sun Staff    Pt called oxyCODONROSANGELA NUNEZ. [facility]     [phone]

## 2022-12-30 NOTE — TELEPHONE ENCOUNTER
Tried to call pt in regards to pt's medication PA, mail box is full and I could not leave a voice mail. Will try to call back later.

## 2022-12-30 NOTE — TELEPHONE ENCOUNTER
Spoke with pt in regards to message sent. Verbalized to the pt that we will need to do a PA for her pain mediation. Pt acknowledge understanding.

## 2022-12-30 NOTE — TELEPHONE ENCOUNTER
Spoke with the pt's pharmacy, [facility] in regards to message sent. They state that they need a PA for pt's oxycodone because it's more than 50 mg daily.

## 2023-01-05 ENCOUNTER — TELEPHONE (OUTPATIENT)
Dept: FAMILY MEDICINE | Facility: CLINIC | Age: 48
End: 2023-01-05

## 2023-01-05 NOTE — TELEPHONE ENCOUNTER
----- Message from Trsita Crespo sent at 1/5/2023  8:59 AM CST -----  PA #[id number] for oxycodone was approved for 9/8/22 - 1/8/23. There should not have been a PA needed yet. Spoke to pharmacy and they stated that the pt picked up the RX and paid cash so they can not research the issue. Tried to renew the pa for next month but the insurance is inactive. Tried to call the patient but the mailbox is full and can not leave a message. Thank you

## 2023-01-10 NOTE — TELEPHONE ENCOUNTER
Left message to call the office to schedule/reschedule appt. Recall number provided.  ----- Message from Sheri Sarah RN sent at 12/29/2022  8:09 AM CST -----    ----- Message -----  From: Subha Sheridan  Sent: 12/28/2022   5:03 PM CST  To: Uvaldo Umanzor Staff    Type: Need Medical Advice  Who Called: Marie  Parish callback number: [phone]  Additional Info: Patient called to reschedule her appointments on 2/28  Please call to further assist, Thanks         This is a telehealth OV to which patient has agreed to. Limitations of telehealth discussed, pt understands and agrees to proceed. Patient's @ home during this virtual OV.  41 yo pt w chronic APRIL sxs here for follow up. Has been on a bland diet and lost some weight. NO cardiorespiratory sxs. EGD 1/22: GERD wo BE, sm HH, Hp + chronic active gastritis wo dysplasia and normal duodenal bx's. He's due for UBT in 4 weeks. Today, he c/o no oropharyngeal dysphagia and intermittent APRIL sxs. Has been on a bland diet. Thyroid US: goiter wo nodules; seen by Endocrine and Dx'd w Hashimoto's on Levothyroxine for past couple mos. Recent Ba Swallow: spontaneous APRIL to the upper thoracic esophagus. Esophageal Motility: decreased bolus transit. He has  no constitutional sxs.   Seen by ENT: LPR w normal rhinoscopy. Had mild COVID-19 and has received 2 doses of COVID-19 vaccine. No other complaints.

## 2023-01-13 DIAGNOSIS — B18.1 CHRONIC VIRAL HEPATITIS B WITHOUT DELTA AGENT AND WITHOUT COMA: ICD-10-CM

## 2023-01-16 RX ORDER — TENOFOVIR ALAFENAMIDE 25 MG/1
25 TABLET ORAL DAILY
Qty: 30 TABLET | Refills: 11 | Status: SHIPPED | OUTPATIENT
Start: 2023-01-16 | End: 2023-01-20

## 2023-01-16 NOTE — TELEPHONE ENCOUNTER
The patient's prescription has been approved and sent to   [facility] - [place] [facility] - [address]  [address]  [place] [address]  Phone: [phone] Fax: [phone]     SSKI Counseling:  I discussed with the patient the risks of SSKI including but not limited to thyroid abnormalities, metallic taste, GI upset, fever, headache, acne, arthralgias, paraesthesias, lymphadenopathy, easy bleeding, arrhythmias, and allergic reaction.

## 2023-01-17 DIAGNOSIS — F41.9 ANXIETY: ICD-10-CM

## 2023-01-17 NOTE — TELEPHONE ENCOUNTER
----- Message from Maren Mccrary sent at 1/17/2023 11:21 AM CST -----  Patient called and stated that she want to know if she need to keep taking her blood pressure medicine because there has been a recall please give her a call back at [phone] and she need a refill of her diazepam called into [facility]

## 2023-01-18 RX ORDER — DIAZEPAM 10 MG/1
10 TABLET ORAL 3 TIMES DAILY
Qty: 90 TABLET | Refills: 0 | Status: SHIPPED | OUTPATIENT
Start: 2023-01-20 | End: 2023-01-19 | Stop reason: SDUPTHER

## 2023-01-19 DIAGNOSIS — F41.9 ANXIETY: ICD-10-CM

## 2023-01-19 RX ORDER — DIAZEPAM 10 MG/1
10 TABLET ORAL 3 TIMES DAILY
Qty: 90 TABLET | Refills: 0 | Status: SHIPPED | OUTPATIENT
Start: 2023-01-20 | End: 2023-02-13 | Stop reason: SDUPTHER

## 2023-01-19 NOTE — TELEPHONE ENCOUNTER
----- Message from Maren Mccrary sent at 1/19/2023  2:09 PM CST -----  Patient called and stated that her medicine is being sent to the wrong pharmacy in the message that was sent to the nurse she wanted it sent to [facility] in [place]. Patient stated she has advised she need to change her pharmacy but her medicine is still being sent to the wrong pharmacy please give her a call and advise when the the prescription will be sent to the correct pharmacy [phone]

## 2023-01-20 ENCOUNTER — TELEPHONE (OUTPATIENT)
Dept: HEPATOLOGY | Facility: CLINIC | Age: 48
End: 2023-01-20
Payer: MEDICAID

## 2023-01-20 ENCOUNTER — TELEPHONE (OUTPATIENT)
Dept: TRANSPLANT | Facility: CLINIC | Age: 48
End: 2023-01-20
Payer: MEDICAID

## 2023-01-20 DIAGNOSIS — B18.1 CHRONIC VIRAL HEPATITIS B WITHOUT DELTA AGENT AND WITHOUT COMA: Primary | ICD-10-CM

## 2023-01-20 DIAGNOSIS — B18.1 CHRONIC VIRAL HEPATITIS B WITHOUT DELTA AGENT AND WITHOUT COMA: ICD-10-CM

## 2023-01-20 RX ORDER — TENOFOVIR ALAFENAMIDE 25 MG/1
25 TABLET ORAL DAILY
Qty: 30 TABLET | Refills: 11 | Status: SHIPPED | OUTPATIENT
Start: 2023-01-20 | End: 2023-01-20 | Stop reason: SDUPTHER

## 2023-01-20 NOTE — TELEPHONE ENCOUNTER
----- Message from Randal Beckett MA sent at 1/19/2023  2:28 PM CST -----  Regarding: FW: Refill  Contact: [phone]    ----- Message -----  From: Marli Mason MA  Sent: 1/19/2023   2:19 PM CST  To: Marlon Nolen Staff  Subject: Refill                                           Patient is calling requesting a refill on     VEMLIDY 25 mg Tab    [place] [address] [address]

## 2023-01-20 NOTE — TELEPHONE ENCOUNTER
Received message from MA. Received call on priority phone.  stated pt is crying on the phone about her medication (Vemlidy). Says [facility] does not have it.    Chart reviewed. Vemlidy Rx was sent today 1/20/23 by Provider to [facility].    Message and Rx has already been sent to Dr Mason to send the Rx to [facility].    Call returned to the patient. Pt very upset and crying. Informed we have cleared it up and the Rx will be handled by [facility]. They will be reaching out to you.  Apologized for the delay.    Patient remains upset. I have been trying to get this since Tues and you call me today. I have even been trying to get Pain Pills.    Patient informed we cannot do anything about the Pain Pills.   Please continue to take all of your other medicines as prescribed.    [facility] will be reaching out to you regarding your refill for Vemlidy.    Patient stated, Joey Bi+++, and hung up.

## 2023-01-20 NOTE — TELEPHONE ENCOUNTER
----- Message from Randal Beckett MA sent at 1/19/2023  2:28 PM CST -----  Regarding: FW: Refill  Contact: [phone]     ----- Message -----  From: Marli Mason MA  Sent: 1/19/2023   2:19 PM CST  To: Marlon Nolen Staff  Subject: Refill                                            Patient is calling requesting a refill on      VEMLIDY 25 mg Tab     [place] [address] [address]

## 2023-01-21 RX ORDER — TENOFOVIR ALAFENAMIDE 25 MG/1
25 TABLET ORAL DAILY
Qty: 30 TABLET | Refills: 11 | Status: SHIPPED | OUTPATIENT
Start: 2023-01-21 | End: 2024-01-31 | Stop reason: SDUPTHER

## 2023-01-24 ENCOUNTER — SPECIALTY PHARMACY (OUTPATIENT)
Dept: PHARMACY | Facility: CLINIC | Age: 48
End: 2023-01-24
Payer: MEDICAID

## 2023-01-24 DIAGNOSIS — B18.1 CHRONIC HEPATITIS B: Primary | ICD-10-CM

## 2023-01-24 DIAGNOSIS — G89.4 CHRONIC PAIN SYNDROME: ICD-10-CM

## 2023-01-24 NOTE — TELEPHONE ENCOUNTER
Vemlidy Rx received. Patient was closed out at [facility] due to no contact on 12/28/22. Documentation from hepatology office on 1/20/23 states Rx was sent to [facility] and they do not have it so Rx was sent back to [facility] for dispensing. However, Vemlidy test claim is rejecting as RTS 2/15/23 since it was last filled at [place] [id number], PHONE #[phone] on 1/20/23. [facility] address: [address] [place] [address].     Attempted (attempt 1) to call patient to determine if she picked up Vemlidy from [facility]. NA, unable to LVM (busy tone). Also attempted to call mom (Yazmin). NA, LVM.

## 2023-01-24 NOTE — TELEPHONE ENCOUNTER
----- Message from Cathy Diaz sent at 1/24/2023  3:29 PM CST -----  Refill on: ROXICODONE    [id number] - [facility] [address] [address] AT [address]    [phone]

## 2023-01-24 NOTE — TELEPHONE ENCOUNTER
Incoming call from patient, patient states the [facility] in [place] does have her prescription ready for . Patient expressed inability to  from pharmacy every month but that she would attempt to drive to the pharmacy to .      Patient stated she would like prescription filled with [facility] since [facility] is able to ship to the patient. Patient's line disconnected, and attempted to call patient back but line was busy. Routing to assigned [facility] to inform.

## 2023-01-24 NOTE — TELEPHONE ENCOUNTER
Patient agreed to call [facility] to find out if her Vemlidy was filled there. She will call us back to confirm.

## 2023-01-25 RX ORDER — OXYCODONE HYDROCHLORIDE 15 MG/1
15 TABLET ORAL EVERY 6 HOURS PRN
Qty: 120 TABLET | Refills: 0 | Status: SHIPPED | OUTPATIENT
Start: 2023-01-28 | End: 2023-01-31 | Stop reason: SDUPTHER

## 2023-01-27 NOTE — TELEPHONE ENCOUNTER
Attempted (attempt 2 at contact) to determine if patient was able to  Vemlidy refill for this month from local [facility] and if she would like [facility] to refill medication in the future. NA, unable to LVM.     If she has not received Vemlidy from local [facility], [facility] can call to request they reverse the claim in order for [facility] to dispense medication. Will continue to f/u.

## 2023-01-30 ENCOUNTER — TELEPHONE (OUTPATIENT)
Dept: FAMILY MEDICINE | Facility: CLINIC | Age: 48
End: 2023-01-30

## 2023-01-30 ENCOUNTER — PATIENT MESSAGE (OUTPATIENT)
Dept: PHARMACY | Facility: CLINIC | Age: 48
End: 2023-01-30
Payer: MEDICAID

## 2023-01-30 NOTE — TELEPHONE ENCOUNTER
Attempted (attempt 3 at contact) to determine if patient was able to  Vemlidy refill for this month from local [facility] and if she would like [facility] to refill medication in the future. NA, unable to LVM. Mychart sent (1/30/23). Will f/u in 1 week and close out if no response back.      If she has not received Vemlidy from local [facility], [facility] can call to request they reverse the claim in order for [facility] to dispense medication. Will continue to f/u.

## 2023-01-30 NOTE — TELEPHONE ENCOUNTER
----- Message from Maren Mccrary sent at 1/30/2023  2:20 PM CST -----  Patient called and stated that her medicine has not been called in and she would like to speak to the nurse to why it has not been called in please give her a call at [phone]

## 2023-01-31 ENCOUNTER — TELEPHONE (OUTPATIENT)
Dept: FAMILY MEDICINE | Facility: CLINIC | Age: 48
End: 2023-01-31

## 2023-01-31 DIAGNOSIS — G89.4 CHRONIC PAIN SYNDROME: ICD-10-CM

## 2023-01-31 RX ORDER — OXYCODONE HYDROCHLORIDE 15 MG/1
15 TABLET ORAL EVERY 6 HOURS PRN
Qty: 120 TABLET | Refills: 0 | Status: SHIPPED | OUTPATIENT
Start: 2023-01-31 | End: 2023-02-13 | Stop reason: SDUPTHER

## 2023-01-31 NOTE — TELEPHONE ENCOUNTER
----- Message from Maren Mccrary sent at 1/31/2023  3:02 PM CST -----  Patient called and stated that she need to speak to the nurse about her medicine she stated something about needing a PA and the pharmacy does have her medicine please give her a call back at [phone]

## 2023-01-31 NOTE — TELEPHONE ENCOUNTER
----- Message from Chyna Luu MA sent at 1/31/2023  2:52 PM CST -----  Regarding: pharmacy change  Pt calling stating that her oxycodone is supposed to go to [facility] on [address] . [phone]

## 2023-02-02 ENCOUNTER — TELEPHONE (OUTPATIENT)
Dept: FAMILY MEDICINE | Facility: CLINIC | Age: 48
End: 2023-02-02

## 2023-02-02 NOTE — TELEPHONE ENCOUNTER
----- Message from Trista Crespo sent at 2/2/2023  9:59 AM CST -----  PA for Oxycodone has been approved for 2/2/23 - 6/2/23. LMOR 2/2/23 [place]

## 2023-02-06 NOTE — TELEPHONE ENCOUNTER
No call back from patient regarding Vemlidy Rx. Will close enrollment due to lack of contact and release Rx to [facility] if she calls back in the future needing a refill.

## 2023-02-09 ENCOUNTER — TELEPHONE (OUTPATIENT)
Dept: FAMILY MEDICINE | Facility: CLINIC | Age: 48
End: 2023-02-09

## 2023-02-09 ENCOUNTER — PATIENT MESSAGE (OUTPATIENT)
Dept: RESEARCH | Facility: HOSPITAL | Age: 48
End: 2023-02-09
Payer: MEDICAID

## 2023-02-09 NOTE — TELEPHONE ENCOUNTER
----- Message from Maren Mccrary sent at 2/9/2023  9:58 AM CST -----  Patient mom (Martha)called and stated that she would like to speak to the nurse about the patient is getting to many pain pills she stated that the patient has been on a binge for the last four days. She stated that she is having issues with the patient. And she stated that if she does not get help she will have to leave her house. She would like to speak to the doctor today [phone]

## 2023-02-09 NOTE — TELEPHONE ENCOUNTER
Spoke to pt's mom. Patient is sleeping all the time. Especially after she picks up her meds. May get up once a day and come get a drink, she always dozing off standing in the kitchen when she does. She will fall asleep on the front porch and be out of it. Cant keep her head up. Patient's mom feel like she is over taking her meds and not sure if she is mixing anything with it. Patient will say things that don't make sense all the time. A lot of personal issues between them. Patient has lived with her mom for 5 years. The son no longer lives there and hasnt for about a year. Patient has been in longterm a few years back and is currently on probation still. Does nothing to help her and is only causing mental drainage on the patient's mother. She is tired and doesn't know what to do with her. Told her I will relay this info to Dr Sun and see what she says and see if she will have me call her back or wait to talk to patient as patient has an appt with us on Monday but she said patient has to set up transportation and doubt she has because she hasn't been awake to do so. So she may not even show up. Patient not allowed to drive her mom's car either.

## 2023-02-10 NOTE — TELEPHONE ENCOUNTER
Thank her for this information and insight in to the patient's care, etc. Will be sure to take this matter up with the patient the next time I see her.

## 2023-02-10 NOTE — TELEPHONE ENCOUNTER
Spoke to patient's mom. Told her Dr Sun plans to speak with the patient at her appt if she comes. Gave her a phone number to [facility] if she wants to try to see what she can do about getting the patient some help. She said patient got up late afternoon yesterday, was out of it. She found out on the porch slumped over, holding onto the AC unit coming out of the window and she made her come inside. She has noticed she is picking her face a lot also. Told her if she ever thinks she needs to call 911 to have patient brought to the hospital, she can.    Made Dr Sun aware.

## 2023-02-10 NOTE — TELEPHONE ENCOUNTER
----- Message from Cathy Diaz sent at 2/10/2023  8:27 AM CST -----  Pt mother Martha called and would like to speak to a nurse about pt taking to many pain pills and Rehab. [phone]

## 2023-02-13 ENCOUNTER — OFFICE VISIT (OUTPATIENT)
Dept: FAMILY MEDICINE | Facility: CLINIC | Age: 48
End: 2023-02-13
Payer: MEDICAID

## 2023-02-13 VITALS
BODY MASS INDEX: 30.1 KG/M2 | WEIGHT: 203.19 LBS | HEIGHT: 69 IN | DIASTOLIC BLOOD PRESSURE: 86 MMHG | OXYGEN SATURATION: 99 % | SYSTOLIC BLOOD PRESSURE: 122 MMHG | HEART RATE: 92 BPM

## 2023-02-13 DIAGNOSIS — K74.60 CIRRHOSIS OF LIVER WITHOUT ASCITES, UNSPECIFIED HEPATIC CIRRHOSIS TYPE: ICD-10-CM

## 2023-02-13 DIAGNOSIS — G89.4 CHRONIC PAIN SYNDROME: ICD-10-CM

## 2023-02-13 DIAGNOSIS — B18.1 CHRONIC HEPATITIS B: ICD-10-CM

## 2023-02-13 DIAGNOSIS — D61.818 PANCYTOPENIA: ICD-10-CM

## 2023-02-13 DIAGNOSIS — F41.9 ANXIETY: ICD-10-CM

## 2023-02-13 DIAGNOSIS — I10 ESSENTIAL HYPERTENSION: Primary | ICD-10-CM

## 2023-02-13 PROCEDURE — 3008F BODY MASS INDEX DOCD: CPT | Mod: CPTII,S$GLB,, | Performed by: FAMILY MEDICINE

## 2023-02-13 PROCEDURE — 1160F PR REVIEW ALL MEDS BY PRESCRIBER/CLIN PHARMACIST DOCUMENTED: ICD-10-PCS | Mod: CPTII,S$GLB,, | Performed by: FAMILY MEDICINE

## 2023-02-13 PROCEDURE — 1160F RVW MEDS BY RX/DR IN RCRD: CPT | Mod: CPTII,S$GLB,, | Performed by: FAMILY MEDICINE

## 2023-02-13 PROCEDURE — 99214 OFFICE O/P EST MOD 30 MIN: CPT | Mod: S$GLB,,, | Performed by: FAMILY MEDICINE

## 2023-02-13 PROCEDURE — 3079F PR MOST RECENT DIASTOLIC BLOOD PRESSURE 80-89 MM HG: ICD-10-PCS | Mod: CPTII,S$GLB,, | Performed by: FAMILY MEDICINE

## 2023-02-13 PROCEDURE — 3074F PR MOST RECENT SYSTOLIC BLOOD PRESSURE < 130 MM HG: ICD-10-PCS | Mod: CPTII,S$GLB,, | Performed by: FAMILY MEDICINE

## 2023-02-13 PROCEDURE — 99214 PR OFFICE/OUTPT VISIT, EST, LEVL IV, 30-39 MIN: ICD-10-PCS | Mod: S$GLB,,, | Performed by: FAMILY MEDICINE

## 2023-02-13 PROCEDURE — 3079F DIAST BP 80-89 MM HG: CPT | Mod: CPTII,S$GLB,, | Performed by: FAMILY MEDICINE

## 2023-02-13 PROCEDURE — 1159F MED LIST DOCD IN RCRD: CPT | Mod: CPTII,S$GLB,, | Performed by: FAMILY MEDICINE

## 2023-02-13 PROCEDURE — 3008F PR BODY MASS INDEX (BMI) DOCUMENTED: ICD-10-PCS | Mod: CPTII,S$GLB,, | Performed by: FAMILY MEDICINE

## 2023-02-13 PROCEDURE — 1159F PR MEDICATION LIST DOCUMENTED IN MEDICAL RECORD: ICD-10-PCS | Mod: CPTII,S$GLB,, | Performed by: FAMILY MEDICINE

## 2023-02-13 PROCEDURE — 3074F SYST BP LT 130 MM HG: CPT | Mod: CPTII,S$GLB,, | Performed by: FAMILY MEDICINE

## 2023-02-13 RX ORDER — OXYCODONE HYDROCHLORIDE 15 MG/1
15 TABLET ORAL EVERY 6 HOURS PRN
Qty: 120 TABLET | Refills: 0 | Status: SHIPPED | OUTPATIENT
Start: 2023-03-01 | End: 2023-04-03

## 2023-02-13 RX ORDER — DIAZEPAM 10 MG/1
10 TABLET ORAL 3 TIMES DAILY
Qty: 90 TABLET | Refills: 2 | Status: SHIPPED | OUTPATIENT
Start: 2023-02-17 | End: 2023-02-16 | Stop reason: SDUPTHER

## 2023-02-13 RX ORDER — LISINOPRIL 10 MG/1
10 TABLET ORAL DAILY
Qty: 30 TABLET | Refills: 5 | Status: SHIPPED | OUTPATIENT
Start: 2023-02-13 | End: 2023-02-16 | Stop reason: SDUPTHER

## 2023-02-13 RX ORDER — SPIRONOLACTONE 100 MG/1
100 TABLET, FILM COATED ORAL DAILY
Qty: 30 TABLET | Refills: 5 | Status: SHIPPED | OUTPATIENT
Start: 2023-02-13 | End: 2023-08-21 | Stop reason: SDUPTHER

## 2023-02-13 NOTE — PROGRESS NOTES
SUBJECTIVE:    Patient ID: Marie Damon is a 47 y.o. female.    Chief Complaint: Follow-up (Meds verbally confirmed/red growth on chest area/mammo&c-scope postponed due to insurance issues//dp)      Pt seen in order to checkup on acute and chronic conditions.    BP is ok today. Denies CP/SOB/HA. Weight is stable.      Anxiety has been doing ok. Has been still taking valium.     Pt has been to see GI ([facility])since her last visit. Has been having trouble with getting to her visits as she uses Medicaid transportation. Will likely need a GI    Mood has been 'day in and day out'.  Has an appt with a new psychiatrist in [facility].  Now on risperdal, lexapro, gabapentin. Risperdal makes her sleep her all day. Then she is up at night. So is trying to take the 0.5mg dose in the morning and the 1mg in the night, with valium. Pt living with her mom. Would like to be closer to her children.    Continues to have a anemia. Having a lot of abdominal pain. Has Hep B and possibly cirrhosis, now on vemlivy ([place]). Continues to follow with  hematology ([facility]).     Pt continues to have chronic back pain. Takes oxycodone as needed for pain.  Told to take alternative to tylenol due to hep B.         Pt denies easy bruising, but has had some excessive bleeding.  The pt had a bump on her chest that she scratched and had a hard time getting it to stop bleeding. Denies alcohol use.   ----------------------------------------------------  Mammogram due  Cscope due        Lab Visit on 11/23/2022   Component Date Value Ref Range Status    Sodium 11/23/2022 134 (L)  136 - 145 mmol/L Final    Potassium 11/23/2022 4.4  3.5 - 5.1 mmol/L Final    Chloride 11/23/2022 106  95 - 110 mmol/L Final    CO2 11/23/2022 22 (L)  23 - 29 mmol/L Final    Glucose 11/23/2022 197 (H)  70 - 110 mg/dL Final    BUN 11/23/2022 15  6 - 20 mg/dL Final    Creatinine 11/23/2022 1.1  0.5 - 1.4 mg/dL Final    Calcium 11/23/2022 8.9  8.7 - 10.5 mg/dL  Final    Total Protein 11/23/2022 6.9  6.0 - 8.4 g/dL Final    Albumin 11/23/2022 2.9 (L)  3.5 - 5.2 g/dL Final    Total Bilirubin 11/23/2022 0.4  0.1 - 1.0 mg/dL Final    Alkaline Phosphatase 11/23/2022 147 (H)  55 - 135 U/L Final    AST 11/23/2022 44 (H)  10 - 40 U/L Final    ALT 11/23/2022 61 (H)  10 - 44 U/L Final    Anion Gap 11/23/2022 6 (L)  8 - 16 mmol/L Final    eGFR 11/23/2022 >60.0  >60 mL/min/1.73 m^2 Final    WBC 11/23/2022 1.52 (LL)  3.90 - 12.70 K/uL Final    RBC 11/23/2022 2.88 (L)  4.00 - 5.40 M/uL Final    Hemoglobin 11/23/2022 8.6 (L)  12.0 - 16.0 g/dL Final    Hematocrit 11/23/2022 27.3 (L)  37.0 - 48.5 % Final    MCV 11/23/2022 95  82 - 98 fL Final    MCH 11/23/2022 29.9  27.0 - 31.0 pg Final    MCHC 11/23/2022 31.5 (L)  32.0 - 36.0 g/dL Final    RDW 11/23/2022 14.9 (H)  11.5 - 14.5 % Final    Platelets 11/23/2022 53 (L)  150 - 450 K/uL Final    MPV 11/23/2022 12.0  9.2 - 12.9 fL Final    Prothrombin Time 11/23/2022 11.4  9.0 - 12.5 sec Final    INR 11/23/2022 1.1  0.8 - 1.2 Final    Hepatitis B Surface Ag 11/23/2022 Reactive (A)  Non-reactive Final    Hep B Viral DNA IU/ML 11/23/2022 87,171 (H)  <10 IU/mL Final    Log HBV IU/mL 11/23/2022 4.94 (H)  <1.00 Log (10) IU/mL Final    Hepatitis B Virus DNA 11/23/2022 DETECTED (A)  Not detected Final    Vitamin B-12 11/23/2022 1668 (H)  210 - 950 pg/mL Final    HBsAg Confirmation 11/23/2022 Confirmed Positive (A)  Not confirmed Final   Admission on 10/19/2022, Discharged on 10/19/2022   Component Date Value Ref Range Status    WBC 10/19/2022 2.82 (L)  3.90 - 12.70 K/uL Final    RBC 10/19/2022 3.99 (L)  4.00 - 5.40 M/uL Final    Hemoglobin 10/19/2022 11.5 (L)  12.0 - 16.0 g/dL Final    Hematocrit 10/19/2022 36.7 (L)  37.0 - 48.5 % Final    MCV 10/19/2022 92  82 - 98 fL Final    MCH 10/19/2022 28.8  27.0 - 31.0 pg Final    MCHC 10/19/2022 31.3 (L)  32.0 - 36.0 g/dL Final    RDW 10/19/2022 14.5  11.5 - 14.5 % Final    Platelets 10/19/2022 72 (L)  150 -  450 K/uL Final    MPV 10/19/2022 12.7  9.2 - 12.9 fL Final    Immature Granulocytes 10/19/2022 0.0  0.0 - 0.5 % Final    Gran # (ANC) 10/19/2022 1.8  1.8 - 7.7 K/uL Final    Immature Grans (Abs) 10/19/2022 0.00  0.00 - 0.04 K/uL Final    Lymph # 10/19/2022 0.7 (L)  1.0 - 4.8 K/uL Final    Mono # 10/19/2022 0.2 (L)  0.3 - 1.0 K/uL Final    Eos # 10/19/2022 0.1  0.0 - 0.5 K/uL Final    Baso # 10/19/2022 0.02  0.00 - 0.20 K/uL Final    nRBC 10/19/2022 0  0 /100 WBC Final    Gran % 10/19/2022 63.5  38.0 - 73.0 % Final    Lymph % 10/19/2022 25.2  18.0 - 48.0 % Final    Mono % 10/19/2022 7.4  4.0 - 15.0 % Final    Eosinophil % 10/19/2022 3.2  0.0 - 8.0 % Final    Basophil % 10/19/2022 0.7  0.0 - 1.9 % Final    Differential Method 10/19/2022 Automated   Final    Sodium 10/19/2022 138  136 - 145 mmol/L Final    Potassium 10/19/2022 4.7  3.5 - 5.1 mmol/L Final    Chloride 10/19/2022 106  95 - 110 mmol/L Final    CO2 10/19/2022 25  22 - 31 mmol/L Final    Glucose 10/19/2022 101  70 - 110 mg/dL Final    BUN 10/19/2022 16  7 - 18 mg/dL Final    Creatinine 10/19/2022 1.01  0.50 - 1.40 mg/dL Final    Calcium 10/19/2022 9.0  8.4 - 10.2 mg/dL Final    Total Protein 10/19/2022 7.9  6.0 - 8.4 g/dL Final    Albumin 10/19/2022 3.9  3.5 - 5.2 g/dL Final    Total Bilirubin 10/19/2022 0.8  0.2 - 1.3 mg/dL Final    Alkaline Phosphatase 10/19/2022 237 (H)  38 - 145 U/L Final    AST 10/19/2022 178 (H)  14 - 36 U/L Final    ALT 10/19/2022 122 (H)  0 - 35 U/L Final    Anion Gap 10/19/2022 7 (L)  8 - 16 mmol/L Final    eGFR 10/19/2022 >60  >60 mL/min/1.73 m^2 Final    Ammonia 10/19/2022 12  9 - 30 umol/L Final    Specimen UA 10/19/2022 Urine, Clean Catch   Final    Color, UA 10/19/2022 Yellow  Yellow, Straw, Allyson Final    Appearance, UA 10/19/2022 Clear  Clear Final    pH, UA 10/19/2022 6.5  5.0 - 8.0 Final    Specific Gravity, UA 10/19/2022 1.010  1.005 - 1.030 Final    Protein, UA 10/19/2022 Negative  Negative Final    Glucose, UA 10/19/2022  Negative  Negative Final    Ketones, UA 10/19/2022 Negative  Negative Final    Bilirubin (UA) 10/19/2022 Negative  Negative Final    Occult Blood UA 10/19/2022 Negative  Negative Final    Nitrite, UA 10/19/2022 Negative  Negative Final    Urobilinogen, UA 10/19/2022 0.2  <2.0 EU/dL Final    Leukocytes, UA 10/19/2022 Negative  Negative Final    Alcohol, Serum 10/19/2022 <10  <10 mg/dL Final    Salicylate Lvl 10/19/2022 <1  0 - 19 mg/dL Final    Amphetamine Screen, Ur 10/19/2022 Negative  Negative Final    Barbiturate Screen, Ur 10/19/2022 Negative  Negative Final    Benzodiazepines 10/19/2022 Presumptive Positive (A)  Negative Final    Cocaine (Metab.) 10/19/2022 Negative  Negative Final    Opiate Scrn, Ur 10/19/2022 Negative  Negative Final    Phencyclidine 10/19/2022 Negative  Negative Final    THC 10/19/2022 Negative  Negative Final    Tricyclic Antidepressants (TCA), U* 10/19/2022 Negative  Negative Final    Creatinine, Urine 10/19/2022 40.7  15.0 - 325.0 mg/dL Final    Toxicology Information 10/19/2022 SEE COMMENT   Final    Acetaminophen (Tylenol), Serum 10/19/2022 <10.0  10.0 - 20.0 ug/mL Final   Admission on 10/07/2022, Discharged on 10/07/2022   Component Date Value Ref Range Status    WBC 10/07/2022 3.11 (L)  3.90 - 12.70 K/uL Final    RBC 10/07/2022 3.53 (L)  4.00 - 5.40 M/uL Final    Hemoglobin 10/07/2022 10.6 (L)  12.0 - 16.0 g/dL Final    Hematocrit 10/07/2022 33.5 (L)  37.0 - 48.5 % Final    MCV 10/07/2022 95  82 - 98 fL Final    MCH 10/07/2022 30.0  27.0 - 31.0 pg Final    MCHC 10/07/2022 31.6 (L)  32.0 - 36.0 g/dL Final    RDW 10/07/2022 15.1 (H)  11.5 - 14.5 % Final    Platelets 10/07/2022 54 (L)  150 - 450 K/uL Final    MPV 10/07/2022 12.7  9.2 - 12.9 fL Final    Immature Granulocytes 10/07/2022 0.0  0.0 - 0.5 % Final    Gran # (ANC) 10/07/2022 2.3  1.8 - 7.7 K/uL Final    Immature Grans (Abs) 10/07/2022 0.00  0.00 - 0.04 K/uL Final    Lymph # 10/07/2022 0.6 (L)  1.0 - 4.8 K/uL Final    Mono #  10/07/2022 0.2 (L)  0.3 - 1.0 K/uL Final    Eos # 10/07/2022 0.0  0.0 - 0.5 K/uL Final    Baso # 10/07/2022 0.03  0.00 - 0.20 K/uL Final    nRBC 10/07/2022 0  0 /100 WBC Final    Gran % 10/07/2022 73.2 (H)  38.0 - 73.0 % Final    Lymph % 10/07/2022 19.3  18.0 - 48.0 % Final    Mono % 10/07/2022 5.5  4.0 - 15.0 % Final    Eosinophil % 10/07/2022 1.0  0.0 - 8.0 % Final    Basophil % 10/07/2022 1.0  0.0 - 1.9 % Final    Differential Method 10/07/2022 Automated   Final    Sodium 10/07/2022 140  136 - 145 mmol/L Final    Potassium 10/07/2022 4.0  3.5 - 5.1 mmol/L Final    Chloride 10/07/2022 103  95 - 110 mmol/L Final    CO2 10/07/2022 26  22 - 31 mmol/L Final    Glucose 10/07/2022 90  70 - 110 mg/dL Final    BUN 10/07/2022 13  7 - 18 mg/dL Final    Creatinine 10/07/2022 0.84  0.50 - 1.40 mg/dL Final    Calcium 10/07/2022 9.0  8.4 - 10.2 mg/dL Final    Total Protein 10/07/2022 8.3  6.0 - 8.4 g/dL Final    Albumin 10/07/2022 3.9  3.5 - 5.2 g/dL Final    Total Bilirubin 10/07/2022 1.2  0.2 - 1.3 mg/dL Final    Alkaline Phosphatase 10/07/2022 387 (H)  38 - 145 U/L Final    AST 10/07/2022 251 (H)  14 - 36 U/L Final    ALT 10/07/2022 170 (H)  0 - 35 U/L Final    Anion Gap 10/07/2022 11  8 - 16 mmol/L Final    eGFR 10/07/2022 >60  >60 mL/min/1.73 m^2 Final    Specimen UA 10/07/2022 Urine, Clean Catch   Final    Color, UA 10/07/2022 Yellow  Yellow, Straw, Allyson Final    Appearance, UA 10/07/2022 Clear  Clear Final    pH, UA 10/07/2022 6.0  5.0 - 8.0 Final    Specific Gravity, UA 10/07/2022 1.020  1.005 - 1.030 Final    Protein, UA 10/07/2022 Negative  Negative Final    Glucose, UA 10/07/2022 Negative  Negative Final    Ketones, UA 10/07/2022 Negative  Negative Final    Bilirubin (UA) 10/07/2022 Negative  Negative Final    Occult Blood UA 10/07/2022 Negative  Negative Final    Nitrite, UA 10/07/2022 Negative  Negative Final    Urobilinogen, UA 10/07/2022 1.0  <2.0 EU/dL Final    Leukocytes, UA 10/07/2022 Negative  Negative Final     Ammonia 10/07/2022 35 (H)  9 - 30 umol/L Final    Amphetamine Screen, Ur 10/07/2022 Negative  Negative Final    Barbiturate Screen, Ur 10/07/2022 Negative  Negative Final    Benzodiazepines 10/07/2022 Presumptive Positive (A)  Negative Final    Cocaine (Metab.) 10/07/2022 Negative  Negative Final    Opiate Scrn, Ur 10/07/2022 Presumptive Positive (A)  Negative Final    Phencyclidine 10/07/2022 Negative  Negative Final    THC 10/07/2022 Negative  Negative Final    Tricyclic Antidepressants (TCA), U* 10/07/2022 Negative  Negative Final    Creatinine, Urine 10/07/2022 203.4  15.0 - 325.0 mg/dL Final    Toxicology Information 10/07/2022 SEE COMMENT   Final    SARS-CoV-2 RNA, Amplification, Qual 10/07/2022 Negative  Negative Final    Magnesium 10/07/2022 2.0  1.6 - 2.6 mg/dL Final   Admission on 08/27/2022, Discharged on 08/27/2022   Component Date Value Ref Range Status    WBC 08/27/2022 3.58 (L)  3.90 - 12.70 K/uL Final    RBC 08/27/2022 3.04 (L)  4.00 - 5.40 M/uL Final    Hemoglobin 08/27/2022 9.2 (L)  12.0 - 16.0 g/dL Final    Hematocrit 08/27/2022 27.8 (L)  37.0 - 48.5 % Final    MCV 08/27/2022 91  82 - 98 fL Final    MCH 08/27/2022 30.3  27.0 - 31.0 pg Final    MCHC 08/27/2022 33.1  32.0 - 36.0 g/dL Final    RDW 08/27/2022 15.3 (H)  11.5 - 14.5 % Final    Platelets 08/27/2022 33 (LL)  150 - 450 K/uL Final    MPV 08/27/2022 SEE COMMENT  9.2 - 12.9 fL Final    Immature Granulocytes 08/27/2022 0.3  0.0 - 0.5 % Final    Gran # (ANC) 08/27/2022 2.9  1.8 - 7.7 K/uL Final    Immature Grans (Abs) 08/27/2022 0.01  0.00 - 0.04 K/uL Final    Lymph # 08/27/2022 0.3 (L)  1.0 - 4.8 K/uL Final    Mono # 08/27/2022 0.3  0.3 - 1.0 K/uL Final    Eos # 08/27/2022 0.0  0.0 - 0.5 K/uL Final    Baso # 08/27/2022 0.01  0.00 - 0.20 K/uL Final    nRBC 08/27/2022 0  0 /100 WBC Final    Gran % 08/27/2022 82.3 (H)  38.0 - 73.0 % Final    Lymph % 08/27/2022 8.7 (L)  18.0 - 48.0 % Final    Mono % 08/27/2022 7.8  4.0 - 15.0 % Final     Eosinophil % 08/27/2022 0.6  0.0 - 8.0 % Final    Basophil % 08/27/2022 0.3  0.0 - 1.9 % Final    Platelet Estimate 08/27/2022 Decreased (A)   Final    Differential Method 08/27/2022 Automated   Final    Sodium 08/27/2022 140  136 - 145 mmol/L Final    Potassium 08/27/2022 3.7  3.5 - 5.1 mmol/L Final    Chloride 08/27/2022 108  95 - 110 mmol/L Final    CO2 08/27/2022 24  22 - 31 mmol/L Final    Glucose 08/27/2022 155 (H)  70 - 110 mg/dL Final    BUN 08/27/2022 16  7 - 18 mg/dL Final    Creatinine 08/27/2022 0.74  0.50 - 1.40 mg/dL Final    Calcium 08/27/2022 8.1 (L)  8.4 - 10.2 mg/dL Final    Total Protein 08/27/2022 6.4  6.0 - 8.4 g/dL Final    Albumin 08/27/2022 2.9 (L)  3.5 - 5.2 g/dL Final    Total Bilirubin 08/27/2022 0.9  0.2 - 1.3 mg/dL Final    Alkaline Phosphatase 08/27/2022 239 (H)  38 - 145 U/L Final    AST 08/27/2022 100 (H)  14 - 36 U/L Final    ALT 08/27/2022 57 (H)  0 - 35 U/L Final    Anion Gap 08/27/2022 8  8 - 16 mmol/L Final    eGFR 08/27/2022 >60  >60 mL/min/1.73 m^2 Final    NT-proBNP 08/27/2022 229  5 - 450 pg/mL Final    Troponin I 08/27/2022 0.037 (H)  0.012 - 0.034 ng/mL Final    Troponin I 08/27/2022 0.029  0.012 - 0.034 ng/mL Final    Ammonia 08/27/2022 37 (H)  9 - 30 umol/L Final    SARS-CoV-2 RNA, Amplification, Qual 08/27/2022 Negative  Negative Final   Admission on 08/23/2022, Discharged on 08/25/2022   Component Date Value Ref Range Status    WBC 08/23/2022 2.78 (L)  3.90 - 12.70 K/uL Final    RBC 08/23/2022 3.39 (L)  4.00 - 5.40 M/uL Final    Hemoglobin 08/23/2022 10.1 (L)  12.0 - 16.0 g/dL Final    Hematocrit 08/23/2022 30.7 (L)  37.0 - 48.5 % Final    MCV 08/23/2022 91  82 - 98 fL Final    MCH 08/23/2022 29.8  27.0 - 31.0 pg Final    MCHC 08/23/2022 32.9  32.0 - 36.0 g/dL Final    RDW 08/23/2022 15.1 (H)  11.5 - 14.5 % Final    Platelets 08/23/2022 57 (L)  150 - 450 K/uL Final    MPV 08/23/2022 11.3  9.2 - 12.9 fL Final    Immature Granulocytes 08/23/2022 0.4  0.0 - 0.5 % Final     Gran # (ANC) 08/23/2022 1.6 (L)  1.8 - 7.7 K/uL Final    Immature Grans (Abs) 08/23/2022 0.01  0.00 - 0.04 K/uL Final    Lymph # 08/23/2022 0.7 (L)  1.0 - 4.8 K/uL Final    Mono # 08/23/2022 0.3  0.3 - 1.0 K/uL Final    Eos # 08/23/2022 0.1  0.0 - 0.5 K/uL Final    Baso # 08/23/2022 0.02  0.00 - 0.20 K/uL Final    nRBC 08/23/2022 0  0 /100 WBC Final    Gran % 08/23/2022 57.6  38.0 - 73.0 % Final    Lymph % 08/23/2022 26.6  18.0 - 48.0 % Final    Mono % 08/23/2022 10.4  4.0 - 15.0 % Final    Eosinophil % 08/23/2022 4.3  0.0 - 8.0 % Final    Basophil % 08/23/2022 0.7  0.0 - 1.9 % Final    Differential Method 08/23/2022 Automated   Final    Sodium 08/23/2022 139  136 - 145 mmol/L Final    Potassium 08/23/2022 3.2 (L)  3.5 - 5.1 mmol/L Final    Chloride 08/23/2022 105  95 - 110 mmol/L Final    CO2 08/23/2022 27  22 - 31 mmol/L Final    Glucose 08/23/2022 121 (H)  70 - 110 mg/dL Final    BUN 08/23/2022 14  7 - 18 mg/dL Final    Creatinine 08/23/2022 0.63  0.50 - 1.40 mg/dL Final    Calcium 08/23/2022 8.0 (L)  8.4 - 10.2 mg/dL Final    Total Protein 08/23/2022 6.6  6.0 - 8.4 g/dL Final    Albumin 08/23/2022 3.1 (L)  3.5 - 5.2 g/dL Final    Total Bilirubin 08/23/2022 1.0  0.2 - 1.3 mg/dL Final    Alkaline Phosphatase 08/23/2022 197 (H)  38 - 145 U/L Final    AST 08/23/2022 87 (H)  14 - 36 U/L Final    ALT 08/23/2022 64 (H)  0 - 35 U/L Final    Anion Gap 08/23/2022 7 (L)  8 - 16 mmol/L Final    eGFR 08/23/2022 >60  >60 mL/min/1.73 m^2 Final    NT-proBNP 08/23/2022 77  5 - 450 pg/mL Final    Troponin I 08/23/2022 0.021  0.012 - 0.034 ng/mL Final    SARS-CoV-2 RNA, Amplification, Qual 08/23/2022 Negative  Negative Final    PT 08/23/2022 13.6  11.8 - 14.7 sec Final    INR 08/23/2022 1.0   Final    WBC 08/24/2022 1.93 (L)  3.90 - 12.70 K/uL Final    RBC 08/24/2022 3.14 (L)  4.00 - 5.40 M/uL Final    Hemoglobin 08/24/2022 9.4 (L)  12.0 - 16.0 g/dL Final    Hematocrit 08/24/2022 29.2 (L)  37.0 - 48.5 % Final    MCV 08/24/2022 93   82 - 98 fL Final    MCH 08/24/2022 29.9  27.0 - 31.0 pg Final    MCHC 08/24/2022 32.2  32.0 - 36.0 g/dL Final    RDW 08/24/2022 15.1 (H)  11.5 - 14.5 % Final    Platelets 08/24/2022 53 (L)  150 - 450 K/uL Final    MPV 08/24/2022 12.1  9.2 - 12.9 fL Final    Immature Granulocytes 08/24/2022 CANCELED  0.0 - 0.5 % Final    Gran # (ANC) 08/24/2022 1.0 (L)  1.8 - 7.7 K/uL Final    Immature Grans (Abs) 08/24/2022 CANCELED  0.00 - 0.04 K/uL Final    nRBC 08/24/2022 0  0 /100 WBC Final    Gran % 08/24/2022 53.0  38.0 - 73.0 % Final    Lymph % 08/24/2022 30.0  18.0 - 48.0 % Final    Mono % 08/24/2022 8.0  4.0 - 15.0 % Final    Eosinophil % 08/24/2022 9.0 (H)  0.0 - 8.0 % Final    Basophil % 08/24/2022 0.0  0.0 - 1.9 % Final    Platelet Estimate 08/24/2022 Decreased (A)   Final    Aniso 08/24/2022 Slight   Final    Hypo 08/24/2022 Occasional   Final    Differential Method 08/24/2022 Manual   Final    Sodium 08/24/2022 142  136 - 145 mmol/L Final    Potassium 08/24/2022 3.9  3.5 - 5.1 mmol/L Final    Chloride 08/24/2022 109  95 - 110 mmol/L Final    CO2 08/24/2022 27  22 - 31 mmol/L Final    Glucose 08/24/2022 115 (H)  70 - 110 mg/dL Final    BUN 08/24/2022 15  7 - 18 mg/dL Final    Creatinine 08/24/2022 0.66  0.50 - 1.40 mg/dL Final    Calcium 08/24/2022 7.6 (L)  8.4 - 10.2 mg/dL Final    Total Protein 08/24/2022 5.7 (L)  6.0 - 8.4 g/dL Final    Albumin 08/24/2022 2.6 (L)  3.5 - 5.2 g/dL Final    Total Bilirubin 08/24/2022 1.0  0.2 - 1.3 mg/dL Final    Alkaline Phosphatase 08/24/2022 171 (H)  38 - 145 U/L Final    AST 08/24/2022 85 (H)  14 - 36 U/L Final    ALT 08/24/2022 50 (H)  0 - 35 U/L Final    Anion Gap 08/24/2022 6 (L)  8 - 16 mmol/L Final    eGFR 08/24/2022 >60  >60 mL/min/1.73 m^2 Final    Magnesium 08/24/2022 1.7  1.6 - 2.6 mg/dL Final    BSA 08/24/2022 2.24  m2 Final    TDI SEPTAL 08/24/2022 0.07  m/s Final    LV LATERAL E/E' RATIO 08/24/2022 7.82  m/s Final    LV SEPTAL E/E' RATIO 08/24/2022 12.29  m/s Final    LA  WIDTH 08/24/2022 4.12  cm Final    Left Ventricular Outflow Tract Amber* 08/24/2022 1.18  cm/s Final    Left Ventricular Outflow Tract Amber* 08/24/2022 6.00  mmHg Final    TDI LATERAL 08/24/2022 0.11  m/s Final    PV PEAK VELOCITY 08/24/2022 1.32  cm/s Final    LVIDd 08/24/2022 4.93  3.5 - 6.0 cm Final    IVS 08/24/2022 1.39 (A)  0.6 - 1.1 cm Final    Posterior Wall 08/24/2022 1.57 (A)  0.6 - 1.1 cm Final    LVIDs 08/24/2022 3.02  2.1 - 4.0 cm Final    FS 08/24/2022 39  28 - 44 % Final    LA volume 08/24/2022 88.96  cm3 Final    STJ 08/24/2022 2.91  cm Final    LV mass 08/24/2022 309.56  g Final    LA size 08/24/2022 4.50  cm Final    RVDD 08/24/2022 2.85  cm Final    Left Ventricle Relative Wall Thick* 08/24/2022 0.64  cm Final    AV mean gradient 08/24/2022 11  mmHg Final    AV valve area 08/24/2022 2.72  cm2 Final    AV Velocity Ratio 08/24/2022 0.74   Final    AV index (prosthetic) 08/24/2022 0.72   Final    MV mean gradient 08/24/2022 3  mmHg Final    MV valve area p 1/2 method 08/24/2022 5.12  cm2 Final    MV valve area by continuity eq 08/24/2022 4.47  cm2 Final    E/A ratio 08/24/2022 1.19   Final    Mean e' 08/24/2022 0.09  m/s Final    E wave deceleration time 08/24/2022 150.00  msec Final    LVOT diameter 08/24/2022 2.20  cm Final    LVOT area 08/24/2022 3.8  cm2 Final    LVOT peak girish 08/24/2022 1.66  m/s Final    LVOT peak VTI 08/24/2022 30.70  cm Final    Ao peak girish 08/24/2022 2.23  m/s Final    Ao VTI 08/24/2022 42.9  cm Final    LVOT stroke volume 08/24/2022 116.64  cm3 Final    AV peak gradient 08/24/2022 20  mmHg Final    MV peak gradient 08/24/2022 5  mmHg Final    E/E' ratio 08/24/2022 9.56  m/s Final    MV Peak E Girish 08/24/2022 0.86  m/s Final    TR Max Girish 08/24/2022 2.08  m/s Final    MV VTI 08/24/2022 26.1  cm Final    MV stenosis pressure 1/2 time 08/24/2022 43.00  ms Final    MV Peak A Girish 08/24/2022 0.72  m/s Final    LV Systolic Volume 08/24/2022 35.60  mL Final    LV Systolic Volume Index  08/24/2022 16.2  mL/m2 Final    LV Diastolic Volume 08/24/2022 114.00  mL Final    LV Diastolic Volume Index 08/24/2022 51.82  mL/m2 Final    LA Volume Index 08/24/2022 40.4  mL/m2 Final    LV Mass Index 08/24/2022 141  g/m2 Final    RA Major Axis 08/24/2022 5.56  cm Final    Left Atrium Minor Axis 08/24/2022 5.65  cm Final    Left Atrium Major Axis 08/24/2022 5.64  cm Final    Triscuspid Valve Regurgitation Pea* 08/24/2022 17  mmHg Final    RA Width 08/24/2022 4.27  cm Final    Right Atrial Pressure (from IVC) 08/24/2022 3  mmHg Final    EF 08/24/2022 65  % Final    TV rest pulmonary artery pressure 08/24/2022 20  mmHg Final    WBC 08/25/2022 1.94 (L)  3.90 - 12.70 K/uL Final    RBC 08/25/2022 3.05 (L)  4.00 - 5.40 M/uL Final    Hemoglobin 08/25/2022 9.1 (L)  12.0 - 16.0 g/dL Final    Hematocrit 08/25/2022 28.4 (L)  37.0 - 48.5 % Final    MCV 08/25/2022 93  82 - 98 fL Final    MCH 08/25/2022 29.8  27.0 - 31.0 pg Final    MCHC 08/25/2022 32.0  32.0 - 36.0 g/dL Final    RDW 08/25/2022 14.9 (H)  11.5 - 14.5 % Final    Platelets 08/25/2022 49 (L)  150 - 450 K/uL Final    MPV 08/25/2022 13.0 (H)  9.2 - 12.9 fL Final    Immature Granulocytes 08/25/2022 0.0  0.0 - 0.5 % Final    Gran # (ANC) 08/25/2022 0.9 (L)  1.8 - 7.7 K/uL Final    Immature Grans (Abs) 08/25/2022 0.00  0.00 - 0.04 K/uL Final    Lymph # 08/25/2022 0.7 (L)  1.0 - 4.8 K/uL Final    Mono # 08/25/2022 0.2 (L)  0.3 - 1.0 K/uL Final    Eos # 08/25/2022 0.1  0.0 - 0.5 K/uL Final    Baso # 08/25/2022 0.02  0.00 - 0.20 K/uL Final    nRBC 08/25/2022 0  0 /100 WBC Final    Gran % 08/25/2022 44.3  38.0 - 73.0 % Final    Lymph % 08/25/2022 36.6  18.0 - 48.0 % Final    Mono % 08/25/2022 12.4  4.0 - 15.0 % Final    Eosinophil % 08/25/2022 5.7  0.0 - 8.0 % Final    Basophil % 08/25/2022 1.0  0.0 - 1.9 % Final    Differential Method 08/25/2022 Automated   Final    Sodium 08/25/2022 142  136 - 145 mmol/L Final    Potassium 08/25/2022 3.5  3.5 - 5.1 mmol/L Final     Chloride 08/25/2022 100  95 - 110 mmol/L Final    CO2 08/25/2022 34 (H)  22 - 31 mmol/L Final    Glucose 08/25/2022 110  70 - 110 mg/dL Final    BUN 08/25/2022 18  7 - 18 mg/dL Final    Creatinine 08/25/2022 0.79  0.50 - 1.40 mg/dL Final    Calcium 08/25/2022 7.6 (L)  8.4 - 10.2 mg/dL Final    Total Protein 08/25/2022 5.8 (L)  6.0 - 8.4 g/dL Final    Albumin 08/25/2022 2.7 (L)  3.5 - 5.2 g/dL Final    Total Bilirubin 08/25/2022 0.6  0.2 - 1.3 mg/dL Final    Alkaline Phosphatase 08/25/2022 181 (H)  38 - 145 U/L Final    AST 08/25/2022 75 (H)  14 - 36 U/L Final    ALT 08/25/2022 51 (H)  0 - 35 U/L Final    Anion Gap 08/25/2022 8  8 - 16 mmol/L Final    eGFR 08/25/2022 >60  >60 mL/min/1.73 m^2 Final    Magnesium 08/25/2022 1.7  1.6 - 2.6 mg/dL Final    AFP 08/25/2022 2.7  0.0 - 8.4 ng/mL Final    Hepatitis C Ab 08/25/2022 Negative   Final   Lab Visit on 08/23/2022   Component Date Value Ref Range Status    Hep B Viral DNA IU/ML 08/23/2022 14,618,562 (H)  <10 IU/mL Final    Log HBV IU/mL 08/23/2022 7.16 (H)  <1.00 Log (10) IU/mL Final    Hepatitis B Virus DNA 08/23/2022 DETECTED (A)  Not detected Final    Hepatitis C Virus (HCV) RNA Detect* 08/23/2022 Undetected  Undetected IU/mL Final    WBC 08/23/2022 2.80 (L)  3.90 - 12.70 K/uL Final    RBC 08/23/2022 3.25 (L)  4.00 - 5.40 M/uL Final    Hemoglobin 08/23/2022 9.6 (L)  12.0 - 16.0 g/dL Final    Hematocrit 08/23/2022 30.1 (L)  37.0 - 48.5 % Final    MCV 08/23/2022 93  82 - 98 fL Final    MCH 08/23/2022 29.5  27.0 - 31.0 pg Final    MCHC 08/23/2022 31.9 (L)  32.0 - 36.0 g/dL Final    RDW 08/23/2022 14.9 (H)  11.5 - 14.5 % Final    Platelets 08/23/2022 60 (L)  150 - 450 K/uL Final    MPV 08/23/2022 12.5  9.2 - 12.9 fL Final    Immature Granulocytes 08/23/2022 0.0  0.0 - 0.5 % Final    Gran # (ANC) 08/23/2022 1.7 (L)  1.8 - 7.7 K/uL Final    Immature Grans (Abs) 08/23/2022 0.00  0.00 - 0.04 K/uL Final    Lymph # 08/23/2022 0.7 (L)  1.0 - 4.8 K/uL Final    Mono # 08/23/2022  0.3  0.3 - 1.0 K/uL Final    Eos # 08/23/2022 0.1  0.0 - 0.5 K/uL Final    Baso # 08/23/2022 0.01  0.00 - 0.20 K/uL Final    nRBC 08/23/2022 0  0 /100 WBC Final    Gran % 08/23/2022 60.0  38.0 - 73.0 % Final    Lymph % 08/23/2022 24.3  18.0 - 48.0 % Final    Mono % 08/23/2022 11.4  4.0 - 15.0 % Final    Eosinophil % 08/23/2022 3.9  0.0 - 8.0 % Final    Basophil % 08/23/2022 0.4  0.0 - 1.9 % Final    Differential Method 08/23/2022 Automated   Final    Sodium 08/23/2022 140  136 - 145 mmol/L Final    Potassium 08/23/2022 3.1 (L)  3.5 - 5.1 mmol/L Final    Chloride 08/23/2022 109  95 - 110 mmol/L Final    CO2 08/23/2022 23  23 - 29 mmol/L Final    Glucose 08/23/2022 90  70 - 110 mg/dL Final    BUN 08/23/2022 12  6 - 20 mg/dL Final    Creatinine 08/23/2022 0.8  0.5 - 1.4 mg/dL Final    Calcium 08/23/2022 8.2 (L)  8.7 - 10.5 mg/dL Final    Total Protein 08/23/2022 6.0  6.0 - 8.4 g/dL Final    Albumin 08/23/2022 2.7 (L)  3.5 - 5.2 g/dL Final    Total Bilirubin 08/23/2022 0.9  0.1 - 1.0 mg/dL Final    Alkaline Phosphatase 08/23/2022 170 (H)  55 - 135 U/L Final    AST 08/23/2022 58 (H)  10 - 40 U/L Final    ALT 08/23/2022 49 (H)  10 - 44 U/L Final    Anion Gap 08/23/2022 8  8 - 16 mmol/L Final    eGFR 08/23/2022 >60.0  >60 mL/min/1.73 m^2 Final    Prothrombin Time 08/23/2022 11.9  9.0 - 12.5 sec Final    INR 08/23/2022 1.2  0.8 - 1.2 Final       Past Medical History:   Diagnosis Date    Bone spur     hip    HTN (hypertension)      Social History     Socioeconomic History    Marital status:    Tobacco Use    Smoking status: Every Day     Packs/day: 1.00     Types: Cigarettes    Smokeless tobacco: Never   Substance and Sexual Activity    Alcohol use: Not Currently    Drug use: Not Currently     Past Surgical History:   Procedure Laterality Date    CHOLECYSTECTOMY      HYSTERECTOMY       History reviewed. No pertinent family history.    Review of patient's allergies indicates:   Allergen Reactions    Pcn [penicillins]  Hives    Morphine Other (See Comments)     heartburn    Bupropion hcl Other (See Comments)    Demerol [meperidine]        Current Outpatient Medications:     bisoprolol (ZEBETA) 5 MG tablet, Take 1 tablet (5 mg total) by mouth once daily., Disp: 30 tablet, Rfl: 2    cyanocobalamin 1,000 mcg/mL injection, Inject 1 mL (1,000 mcg total) into the muscle every 28 days., Disp: 30 mL, Rfl: 0    diclofenac sodium (VOLTAREN) 1 % Gel, Apply 2 g topically 4 (four) times daily., Disp: 100 g, Rfl: 0    docusate calcium (SURFAK) 240 mg capsule, Take 240 mg by mouth once daily., Disp: , Rfl:     EScitalopram oxalate (LEXAPRO) 10 MG tablet, Take 1 tablet (10 mg total) by mouth once daily., Disp: 30 tablet, Rfl: 2    EScitalopram oxalate (LEXAPRO) 5 MG Tab, Take 5 mg by mouth once daily., Disp: , Rfl:     furosemide (LASIX) 20 MG tablet, Take 1 tablet (20 mg total) by mouth once daily., Disp: 30 tablet, Rfl: 2    gabapentin (NEURONTIN) 300 MG capsule, Take 1 capsule (300 mg total) by mouth 2 (two) times daily., Disp: 60 capsule, Rfl: 2    naloxone (NARCAN) 4 mg/actuation Spry, each nostril if not breathing or not responsive, Disp: 1 each, Rfl: 2    ondansetron (ZOFRAN-ODT) 4 MG TbDL, Take 1 tablet (4 mg total) by mouth every 8 (eight) hours as needed (nausea)., Disp: 30 tablet, Rfl: 0    risperiDONE (RISPERDAL) 0.5 MG Tab, Take 1 tablet (0.5 mg total) by mouth once daily. along with 1 mg daily, Disp: 30 tablet, Rfl: 1    risperiDONE (RISPERDAL) 1 MG tablet, Take 1 tablet (1 mg total) by mouth 2 (two) times daily., Disp: 60 tablet, Rfl: 2    senna (SENOKOT) 8.6 mg tablet, Take 1 tablet by mouth daily as needed for Constipation., Disp: 30 tablet, Rfl: 2    tenofovir alafenamide (VEMLIDY) 25 mg Tab, Take 1 tablet (25 mg total) by mouth once daily., Disp: 30 tablet, Rfl: 11    [START ON 2/17/2023] diazePAM (VALIUM) 10 MG Tab, Take 1 tablet (10 mg total) by mouth 3 (three) times daily., Disp: 90 tablet, Rfl: 2    lisinopriL 10 MG tablet,  "Take 1 tablet (10 mg total) by mouth once daily., Disp: 30 tablet, Rfl: 5    [START ON 3/1/2023] oxyCODONE (ROXICODONE) 15 MG Tab, Take 1 tablet (15 mg total) by mouth every 6 (six) hours as needed for Pain., Disp: 120 tablet, Rfl: 0    spironolactone (ALDACTONE) 100 MG tablet, Take 1 tablet (100 mg total) by mouth once daily., Disp: 30 tablet, Rfl: 5    Review of Systems   Constitutional:  Negative for appetite change, fatigue, fever and unexpected weight change.   Respiratory:  Negative for cough, chest tightness, shortness of breath and wheezing.    Cardiovascular:  Negative for chest pain and leg swelling.   Gastrointestinal:  Positive for abdominal pain. Negative for constipation, nausea and vomiting.        -heartburn, bloating   Genitourinary:  Negative for difficulty urinating, dysuria, frequency and urgency.   Musculoskeletal:  Positive for arthralgias and back pain. Negative for myalgias and neck pain.   Skin:  Negative for rash.   Neurological:  Negative for dizziness, weakness, numbness and headaches.   Hematological:  Does not bruise/bleed easily.   Psychiatric/Behavioral:  Positive for sleep disturbance. Negative for dysphoric mood and suicidal ideas. The patient is nervous/anxious.    All other systems reviewed and are negative.       Objective:      Vitals:    02/13/23 1616   BP: 122/86   Pulse: 92   SpO2: 99%   Weight: 92.2 kg (203 lb 3.2 oz)   Height: 5' 9" (1.753 m)       Wt Readings from Last 6 Encounters:   02/13/23 92.2 kg (203 lb 3.2 oz)   11/29/22 91.1 kg (200 lb 13.4 oz)   11/23/22 92.8 kg (204 lb 9.4 oz)   11/17/22 93.4 kg (206 lb)   10/24/22 89.3 kg (196 lb 13.9 oz)   10/19/22 90.7 kg (200 lb)         Physical Exam  Vitals reviewed.   Constitutional:       General: She is not in acute distress.     Appearance: Normal appearance. She is well-developed.      Comments: overweight   HENT:      Head: Normocephalic and atraumatic.   Neck:      Thyroid: No thyromegaly.   Cardiovascular:      Rate " and Rhythm: Normal rate and regular rhythm.      Heart sounds: No murmur heard.    No friction rub.   Pulmonary:      Effort: Pulmonary effort is normal.      Breath sounds: Normal breath sounds. No wheezing or rales.   Abdominal:      General: There is no distension.      Palpations: Abdomen is soft.      Tenderness: There is no abdominal tenderness. There is no guarding or rebound.   Musculoskeletal:      Cervical back: Neck supple.      Right lower leg: Edema present.      Left lower leg: Edema present.   Lymphadenopathy:      Cervical: No cervical adenopathy.   Skin:     General: Skin is warm and dry.      Findings: No rash.   Neurological:      Mental Status: She is alert and oriented to person, place, and time.   Psychiatric:         Attention and Perception: Attention normal. She does not perceive auditory hallucinations.         Mood and Affect: Mood is depressed.         Speech: Speech normal.         Behavior: Behavior normal.         Thought Content: Thought content normal.         Cognition and Memory: Cognition is not impaired.         Judgment: Judgment normal.         Assessment:       1. Essential hypertension    2. Chronic hepatitis B    3. Cirrhosis of liver without ascites, unspecified hepatic cirrhosis type    4. Anxiety    5. Pancytopenia    6. Chronic pain syndrome           Plan:       Essential hypertension  Comments:  Controlled. Will continue to monitor BP on current medication regimen  Orders:  -     lisinopriL 10 MG tablet; Take 1 tablet (10 mg total) by mouth once daily.  Dispense: 30 tablet; Refill: 5    Chronic hepatitis B  Comments:  Chronic. To continue to follow with Hepatology. Pt advised to not bleed.    Cirrhosis of liver without ascites, unspecified hepatic cirrhosis type  Comments:  Chronic. Will continue to monitor symptoms.   Orders:  -     spironolactone (ALDACTONE) 100 MG tablet; Take 1 tablet (100 mg total) by mouth once daily.  Dispense: 30 tablet; Refill:  5    Anxiety  Comments:  Symptomatic.  Will continue to monitor on Valium. To follow with psyche soon  Orders:  -     diazePAM (VALIUM) 10 MG Tab; Take 1 tablet (10 mg total) by mouth 3 (three) times daily.  Dispense: 90 tablet; Refill: 2    Pancytopenia  Comments:  Likely due to cirrhosis    Chronic pain syndrome  Comments:  Pain controlled. Will continue to monitor the pain control and function  Orders:  -     oxyCODONE (ROXICODONE) 15 MG Tab; Take 1 tablet (15 mg total) by mouth every 6 (six) hours as needed for Pain.  Dispense: 120 tablet; Refill: 0      Labs and/or tests have been ordered for the evaluation/monitoring of acute/chronic conditions, to be done just before next visit.    Chronic Pain Notes:  Have discussed the risks and benefits of chronic narcotic therapy including pain control, dependence, and addiction.  The patient is aware and accepts the risks and benefits. Patient is aware of the risk of taking narcotics combined with benzodiazepines/muscle relaxers/hypnotics, including but not limited to breathing difficulties, coma, and death; accepts the risks; and agrees to use medications only as prescribed.    Follow up in about 3 months (around 5/13/2023) for HTN, Anxiety, Chronic Pain, LABS.        2/13/2023 Marleen Sun

## 2023-02-14 ENCOUNTER — RESEARCH ENCOUNTER (OUTPATIENT)
Dept: RESEARCH | Facility: HOSPITAL | Age: 48
End: 2023-02-14
Payer: MEDICAID

## 2023-02-14 ENCOUNTER — PATIENT MESSAGE (OUTPATIENT)
Dept: RESEARCH | Facility: HOSPITAL | Age: 48
End: 2023-02-14
Payer: MEDICAID

## 2023-02-14 NOTE — PROGRESS NOTES
RESEARCH STUDY CONSENT ENCOUNTER  ORGAN TRANSPLANT  [facility] MORGAN EUBANKS    Study Title: Role of Tumor-Induced Immune Tolerance in the Patient Response to Locoregional Therapy: Implications in Assessment Risk of Hepatocellular Carcinoma Recurrence Following Liver Transplantation    IRB #: 2016.131.B    IRB Approval Date: 6/8/2016    : Los Logan MD  Sub-investigator: Artie Mcbride, PhD    Patient Number: [id number]      Patient is scheduled for labs on (date: 2/27/2023).     This study is an observational study to evaluate patients receiving transarterial chemoembolization (TACE) or transarterial radioembolization (TARE) therapy to define the link between tumor-elicited peripheral cell populations, and the risk of hepatocellular carcinoma (HCC) recurrence before orthotopic liver transplantation (OLT). [IRB 2016.131.B] Patient is being consented as a control for this study and participation in this study will end after specimen collection. This patient is NOT undergoing TACE or TARE, and DOES NOT have HCC.     Present for discussion: patient Marie Damon, consenter Allyson Pulido, witness Andrea Bonner  Is LAR Consenting for Subject: YES/NO: no    Prior to the Informed Consent (IC) being signed, or any protocol required testing, procedure, or intervention being performed, the following was done or discussed with Marie Damon:    Purpose of the Study, Qualifications to Participate: YES/NO: yes  Study Design, Schedule and Procedures: YES/NO: yes  Risks, Benefits, Alternative Treatments, Compensation and Costs: YES/NO: yes  Confidentiality and HIPAA Authorization for Release of Medical Records for the research trial/subject's right/study related injury: YES/NO: yes  Study related contact information: YES/NO: yes  Voluntary Participation and Withdrawal from the research trial at any time: YES/NO: yes  Patient has been offered the opportunity to ask questions regarding the study and all questions were  answered satisfactorily: YES/NO: yes  Patient verbalizes understanding of the study/procedures and agrees to participate: YES/NO: yes  CRC and PI contact information given to patient:YES/NO: yes  Verification the ICF was appropriately completed: YES/NO: yes  Signed copy given to patient: YES/NO: yes  Copy in patient's chart and original uploaded to Epic: YES/NO: yes    Research staff present during consent: consenter Allyson Pulido, witness Andrea Bonner      No study procedures (I.e. specimen collection) were performed before the informed consent was signed.     In accordance with the study protocol, Research Lab orders will be placed and specimens will be collected on (date: 2/27/2023) in:  [facility] VNP: YES/NO: no  [facility] LABTX: YES/NO: no  [facility] IM: YES/NO: no  Will be collected at [facility] lab    Allyson Pulido  Admin Research- Liver Transplant

## 2023-02-16 DIAGNOSIS — I10 ESSENTIAL HYPERTENSION: ICD-10-CM

## 2023-02-16 DIAGNOSIS — F41.9 ANXIETY: ICD-10-CM

## 2023-02-16 RX ORDER — DIAZEPAM 10 MG/1
10 TABLET ORAL 3 TIMES DAILY
Qty: 90 TABLET | Refills: 2 | Status: SHIPPED | OUTPATIENT
Start: 2023-02-16 | End: 2023-03-23 | Stop reason: SDUPTHER

## 2023-02-16 RX ORDER — LISINOPRIL 10 MG/1
10 TABLET ORAL DAILY
Qty: 90 TABLET | Refills: 1 | Status: SHIPPED | OUTPATIENT
Start: 2023-02-16 | End: 2023-08-21 | Stop reason: SDUPTHER

## 2023-02-16 NOTE — TELEPHONE ENCOUNTER
----- Message from Cathy Emily sent at 2/16/2023 11:32 AM CST -----  Pt called and stated that all her medication should be sent to [facility] - [facility] [address].  Her Pain Medication should be sent to [facility]/PHARMACY #[id number] - [place], [place] - [address]    Pt would like her VALIUM and her Lisnopril Sent to [facility] in [place]. Pt is using Medicaid transportation and will be out tomorrow and that is the only way she can get her medication. She asked if we could please do it today. [phone]

## 2023-02-17 NOTE — TELEPHONE ENCOUNTER
The patient's prescription has been approved and sent to   [facility] - [place] - [address] [address]  Phone: [phone] Fax: [phone]

## 2023-02-23 ENCOUNTER — TELEPHONE (OUTPATIENT)
Dept: FAMILY MEDICINE | Facility: CLINIC | Age: 48
End: 2023-02-23

## 2023-02-24 DIAGNOSIS — Z00.6 RESEARCH STUDY PATIENT: Primary | ICD-10-CM

## 2023-02-27 ENCOUNTER — TELEPHONE (OUTPATIENT)
Dept: HEPATOLOGY | Facility: CLINIC | Age: 48
End: 2023-02-27
Payer: MEDICAID

## 2023-02-27 ENCOUNTER — SPECIALTY PHARMACY (OUTPATIENT)
Dept: PHARMACY | Facility: CLINIC | Age: 48
End: 2023-02-27
Payer: MEDICAID

## 2023-02-27 DIAGNOSIS — B18.1 CHRONIC HEPATITIS B: Primary | ICD-10-CM

## 2023-02-27 NOTE — TELEPHONE ENCOUNTER
Specialty Pharmacy - Refill Coordination    Specialty Medication Orders Linked to Encounter      Flowsheet Row Most Recent Value   Medication #1 tenofovir alafenamide (VEMLIDY) 25 mg Tab (Order#[id number], Rx#[id number])          Patient is [facility]ing back to [facility] for refills. Patient knows to call [facility] when she has 7 doses remaining since [facility] has a hard time reaching her for refills. Patient verbalized understanding.     Refill Questions - Documented Responses      Flowsheet Row Most Recent Value   Patient Availability and HIPAA Verification    Does patient want to proceed with activity? Yes   HIPAA/medical authority confirmed? Yes   Relationship to patient of person spoken to? Self   Refill Screening Questions    Changes to allergies? No   Changes to medications? No   New conditions since last clinic visit? No   Unplanned office visit, urgent care, ED, or hospital admission in the last 4 weeks? No   How does patient/caregiver feel medication is working? Good   Financial problems or insurance changes? No   How many doses of your specialty medications were missed in the last 4 weeks? 0   When does the patient need to receive the medication? 03/04/23   Refill Delivery Questions    How will the patient receive the medication? MEDRx   When does the patient need to receive the medication? 03/04/23   Shipping Address Home   Address in [facility] confirmed and updated if neccessary? Yes   Expected Copay ($) 0   Is the patient able to afford the medication copay? Yes   Payment Method zero copay   Days supply of Refill 30   Supplies needed? No supplies needed   Refill activity completed? Yes   Refill activity plan Refill scheduled   Shipment/Pickup Date: 02/28/23            Current Outpatient Medications   Medication Sig    bisoprolol (ZEBETA) 5 MG tablet Take 1 tablet (5 mg total) by mouth once daily.    cyanocobalamin 1,000 mcg/mL injection Inject 1 mL (1,000 mcg total) into the muscle every 28  days.    diazePAM (VALIUM) 10 MG Tab Take 1 tablet (10 mg total) by mouth 3 (three) times daily.    diclofenac sodium (VOLTAREN) 1 % Gel Apply 2 g topically 4 (four) times daily.    docusate calcium (SURFAK) 240 mg capsule Take 240 mg by mouth once daily.    EScitalopram oxalate (LEXAPRO) 10 MG tablet Take 1 tablet (10 mg total) by mouth once daily.    EScitalopram oxalate (LEXAPRO) 5 MG Tab Take 5 mg by mouth once daily.    furosemide (LASIX) 20 MG tablet Take 1 tablet (20 mg total) by mouth once daily.    gabapentin (NEURONTIN) 300 MG capsule Take 1 capsule (300 mg total) by mouth 2 (two) times daily.    lisinopriL 10 MG tablet Take 1 tablet (10 mg total) by mouth once daily.    naloxone (NARCAN) 4 mg/actuation Spry each nostril if not breathing or not responsive    ondansetron (ZOFRAN-ODT) 4 MG TbDL Take 1 tablet (4 mg total) by mouth every 8 (eight) hours as needed (nausea).    [START ON 3/1/2023] oxyCODONE (ROXICODONE) 15 MG Tab Take 1 tablet (15 mg total) by mouth every 6 (six) hours as needed for Pain.    risperiDONE (RISPERDAL) 0.5 MG Tab Take 1 tablet (0.5 mg total) by mouth once daily. along with 1 mg daily    risperiDONE (RISPERDAL) 1 MG tablet Take 1 tablet (1 mg total) by mouth 2 (two) times daily.    senna (SENOKOT) 8.6 mg tablet Take 1 tablet by mouth daily as needed for Constipation.    spironolactone (ALDACTONE) 100 MG tablet Take 1 tablet (100 mg total) by mouth once daily.    tenofovir alafenamide (VEMLIDY) 25 mg Tab Take 1 tablet (25 mg total) by mouth once daily.   Last reviewed on 2/13/2023  4:32 PM by Marleen Sun MD    Review of patient's allergies indicates:   Allergen Reactions    Pcn [penicillins] Hives    Morphine Other (See Comments)     heartburn    Bupropion hcl Other (See Comments)    Demerol [meperidine]     Last reviewed on  2/16/2023 11:55 AM by Tia Hernandez      Tasks added this encounter   3/27/2023 - Refill Call (Auto Added)   Tasks due within next 3 months   No tasks due.      Tia Caal, PharmD  [facility]  [address]  [facility] [address]  Phone: [phone]  Fax: [phone]

## 2023-02-27 NOTE — TELEPHONE ENCOUNTER
----- Message from Alex Urban sent at 2/27/2023 11:24 AM CST -----  Regarding: Patient Care  Patient called in requesting to speak with nurse regarding her upcoming appt, patient states she will not be able to make it on 3/3. Attempted to assist but provider has not available until 5/31. Pt requested a call back to discuss further.          Contact: [phone]

## 2023-02-27 NOTE — TELEPHONE ENCOUNTER
MA contacted pt she stated she wont be able to make her upcoming appointment on 3/3/2023 due to upcoming surgery appointment but stated she needs something before the next available which is May 2023 please advise

## 2023-02-28 ENCOUNTER — TELEPHONE (OUTPATIENT)
Dept: HEMATOLOGY/ONCOLOGY | Facility: CLINIC | Age: 48
End: 2023-02-28
Payer: MEDICAID

## 2023-02-28 ENCOUNTER — TELEPHONE (OUTPATIENT)
Dept: HEPATOLOGY | Facility: CLINIC | Age: 48
End: 2023-02-28
Payer: MEDICAID

## 2023-02-28 NOTE — TELEPHONE ENCOUNTER
Spoke w pt's mother and left a message with her for pt to give us a call back about rescheduling appt on 3/3

## 2023-02-28 NOTE — TELEPHONE ENCOUNTER
Spoke with the pt and got the pt rescheduled on 03/09. Pt verbalized and understanding  ----- Message from Lucrecia Carrera sent at 2/28/2023  9:28 AM CST -----  Type: Needs Medical Advice  Who Called:  Patient   Symptoms (please be specific):    How long has patient had these symptoms:    Pharmacy name and phone #:    Best Call Back Number: [phone]  Additional Information: Patient is requesting a call back from [place].

## 2023-02-28 NOTE — TELEPHONE ENCOUNTER
----- Message from Alex Urban sent at 2/28/2023  9:21 AM CST -----  Regarding: Follow Up  Patient calling in to follow up on her conversation from yesterday to determine if she can be seen before 5/31. Requested a call back to advise.              Contact: [phone]

## 2023-03-01 ENCOUNTER — TELEPHONE (OUTPATIENT)
Dept: HEPATOLOGY | Facility: CLINIC | Age: 48
End: 2023-03-01
Payer: MEDICAID

## 2023-03-01 ENCOUNTER — TELEPHONE (OUTPATIENT)
Dept: FAMILY MEDICINE | Facility: CLINIC | Age: 48
End: 2023-03-01

## 2023-03-01 NOTE — TELEPHONE ENCOUNTER
----- Message from Trista Crespo sent at 3/1/2023  4:32 PM CST -----  PA for diazepam has been approved for 2/28/23 - 6/28/23. Thank you

## 2023-03-03 ENCOUNTER — TELEPHONE (OUTPATIENT)
Dept: HEPATOLOGY | Facility: CLINIC | Age: 48
End: 2023-03-03
Payer: MEDICAID

## 2023-03-03 NOTE — TELEPHONE ENCOUNTER
Spoke w pt she wasn't able to make today's appt due to a blow out stated she cant wait til the next available in May 2023 due to swollen feet and her belly looks like she's about to have 3 babies

## 2023-03-03 NOTE — TELEPHONE ENCOUNTER
06/27/17 1435   Final Note   Assessment Type Final Discharge Note   Discharge Disposition Home   Discharge planning education complete? Yes   Hospital Follow Up  Appt(s) scheduled? Yes   Discharge plans and expectations educations in teach back method with documentation complete? Yes   Offered [facility] -- Bedside Delivery? Yes   Discharge/Hospital Encounter Summary to ([facility]) PCP Yes     Follow-up With  Details  Why  Contact Info   Abimael Posada Jr., MD  On 6/30/2017  at 8:30  [address] [address]  [phone]   Vlad Antonio II, MD  On 8/3/2017  at 1:40 for follow up  [address] [facility] [address]  [phone]        ----- Message from Martell López sent at 3/3/2023  2:11 PM CST -----  Regarding: call back  Pt call to reschedule appt    Call

## 2023-03-07 ENCOUNTER — TELEPHONE (OUTPATIENT)
Dept: FAMILY MEDICINE | Facility: CLINIC | Age: 48
End: 2023-03-07

## 2023-03-07 ENCOUNTER — TELEPHONE (OUTPATIENT)
Dept: HEPATOLOGY | Facility: CLINIC | Age: 48
End: 2023-03-07
Payer: MEDICAID

## 2023-03-07 NOTE — TELEPHONE ENCOUNTER
----- Message from Martell López sent at 3/7/2023  3:47 PM CST -----  Regarding: call back  Pt call to reschedule appt due to her swollen    requesting call back please call after 3:40pm  Call

## 2023-03-07 NOTE — TELEPHONE ENCOUNTER
----- Message from Cathy Mcghee MA sent at 3/7/2023 10:03 AM CST -----  Regarding: rx needs auth  [facility] needs auth for rx oxycodone 15 mg  Insurance [facility]  [phone]

## 2023-03-07 NOTE — TELEPHONE ENCOUNTER
Contacted pt she stated she's not able to wait til may 2023 for a follow up due to pain and being swollen, stated also she may need a scope per Dr. Acuna at [facility] in [place] and wanted to confirm w pt's liver provider

## 2023-03-08 DIAGNOSIS — Z00.6 RESEARCH STUDY PATIENT: Primary | ICD-10-CM

## 2023-03-09 ENCOUNTER — TELEPHONE (OUTPATIENT)
Dept: HEPATOLOGY | Facility: CLINIC | Age: 48
End: 2023-03-09
Payer: MEDICAID

## 2023-03-13 DIAGNOSIS — D64.9 ANEMIA, UNSPECIFIED TYPE: Primary | ICD-10-CM

## 2023-03-13 DIAGNOSIS — Z00.6 RESEARCH STUDY PATIENT: Primary | ICD-10-CM

## 2023-03-14 ENCOUNTER — TELEPHONE (OUTPATIENT)
Dept: FAMILY MEDICINE | Facility: CLINIC | Age: 48
End: 2023-03-14

## 2023-03-14 NOTE — TELEPHONE ENCOUNTER
Spoke to patient. Advised we have an order in the system that is still good. Asked her where she'd like it done, she [facility] in [place]. Advised I would fax order now.      Order faxed to [facility] [phone]

## 2023-03-14 NOTE — TELEPHONE ENCOUNTER
----- Message from Cathy Diaz sent at 3/14/2023  2:04 PM CDT -----  Pt would like to have a mammogram order placed. [phone]

## 2023-03-17 ENCOUNTER — TELEPHONE (OUTPATIENT)
Dept: HEMATOLOGY/ONCOLOGY | Facility: CLINIC | Age: 48
End: 2023-03-17
Payer: MEDICAID

## 2023-03-21 ENCOUNTER — SPECIALTY PHARMACY (OUTPATIENT)
Dept: PHARMACY | Facility: CLINIC | Age: 48
End: 2023-03-21
Payer: MEDICAID

## 2023-03-21 DIAGNOSIS — G89.4 CHRONIC PAIN SYNDROME: ICD-10-CM

## 2023-03-21 DIAGNOSIS — K74.60 CIRRHOSIS OF LIVER WITHOUT ASCITES, UNSPECIFIED HEPATIC CIRRHOSIS TYPE: ICD-10-CM

## 2023-03-21 RX ORDER — FUROSEMIDE 20 MG/1
20 TABLET ORAL DAILY
Qty: 30 TABLET | Refills: 2 | Status: SHIPPED | OUTPATIENT
Start: 2023-03-21 | End: 2023-10-11 | Stop reason: SDUPTHER

## 2023-03-21 RX ORDER — GABAPENTIN 300 MG/1
300 CAPSULE ORAL 2 TIMES DAILY
Qty: 60 CAPSULE | Refills: 2 | Status: SHIPPED | OUTPATIENT
Start: 2023-03-21 | End: 2023-06-19 | Stop reason: SDUPTHER

## 2023-03-21 NOTE — TELEPHONE ENCOUNTER
Rx set up for Gabapentin & Furosemide  Diazepam has 2 refills on file at pharmacy  Patient got pain med on 3/10/23

## 2023-03-21 NOTE — TELEPHONE ENCOUNTER
Incoming call from patient, patient reports about 7 to 8 doses on hand, claim states RTS until 3/25/23, informed patient [facility] will follow back up on 3/27/23 to set up delivery. Patient expressed understanding.

## 2023-03-21 NOTE — TELEPHONE ENCOUNTER
----- Message from Chyna Luu MA sent at 3/21/2023  2:25 PM CDT -----  Regarding: refills  Pt needs gabapentin, diazepam, and furosemide sent to [facility] in [facility]. Pt states the pharmacy told her she needs a new pain med script in order to get it filled. [phone]

## 2023-03-22 ENCOUNTER — TELEPHONE (OUTPATIENT)
Dept: FAMILY MEDICINE | Facility: CLINIC | Age: 48
End: 2023-03-22

## 2023-03-22 NOTE — TELEPHONE ENCOUNTER
The patient's prescription has been approved and sent to   [facility]/pharmacy #[id number] - [place], [place] - [address] [place] [address]  Phone: [phone] Fax: [phone]

## 2023-03-22 NOTE — TELEPHONE ENCOUNTER
----- Message from Lindsey Rothman MA sent at 3/22/2023  3:05 PM CDT -----  Patient is calling to check on the status of her diazepam prior auth.  States she has been waiting on her medication since the 15th.  Patient states she needs a call back today.    Ms. Damon: [phone]  -DN

## 2023-03-22 NOTE — TELEPHONE ENCOUNTER
Spoke to pt's mom. Patient wasn't home. Advised med was approved as of 2/28/23 but she needs to get it from [facility].

## 2023-03-23 ENCOUNTER — TELEPHONE (OUTPATIENT)
Dept: FAMILY MEDICINE | Facility: CLINIC | Age: 48
End: 2023-03-23

## 2023-03-23 DIAGNOSIS — F41.9 ANXIETY: ICD-10-CM

## 2023-03-23 RX ORDER — DIAZEPAM 10 MG/1
10 TABLET ORAL 3 TIMES DAILY
Qty: 90 TABLET | Refills: 1 | Status: SHIPPED | OUTPATIENT
Start: 2023-03-23 | End: 2023-06-19 | Stop reason: SDUPTHER

## 2023-03-23 NOTE — TELEPHONE ENCOUNTER
----- Message from Cathy Diaz sent at 3/23/2023  8:37 AM CDT -----  Pt would like to speak to a nurse. She states that her medication. All her medication should go to [facility] in [facility]. Including her pain medication. [phone]

## 2023-03-23 NOTE — TELEPHONE ENCOUNTER
The patient's prescription has been approved and sent to   [facility]/pharmacy #[id number] - [place], [place] - [address] [place] [address]  Phone: [phone] Fax: [phone]

## 2023-03-23 NOTE — TELEPHONE ENCOUNTER
Spoke to patient. Advised she has asked us to send medicines to 4 different pharmacies in the last 5 months so that is causing confusion. Advised if possible to try to stick to one pharmacy.     [facility]

## 2023-03-23 NOTE — TELEPHONE ENCOUNTER
----- Message from Luz Hart LPN sent at 3/23/2023 10:15 AM CDT -----  Regarding: FW: diazepam    ----- Message -----  From: Cathy Mcghee MA  Sent: 3/23/2023  10:14 AM CDT  To: Fifi Madsen Staff  Subject: diazepam                                         Susu returned call to inform fifi that she can resend rx for diazepam and the pharm. Will re-process it.

## 2023-03-27 ENCOUNTER — TELEPHONE (OUTPATIENT)
Dept: HEPATOLOGY | Facility: CLINIC | Age: 48
End: 2023-03-27
Payer: MEDICAID

## 2023-03-27 NOTE — TELEPHONE ENCOUNTER
Spoke w pt who stated she needs an appt sooner than June 2023 due to she's having a scope soon and need to confirm from a liver standing point about the anesthesia

## 2023-03-27 NOTE — TELEPHONE ENCOUNTER
Specialty Pharmacy - Refill Coordination    Specialty Medication Orders Linked to Encounter      Flowsheet Row Most Recent Value   Medication #1 tenofovir alafenamide (VEMLIDY) 25 mg Tab (Order#[id number], Rx#[id number])            Refill Questions - Documented Responses      Flowsheet Row Most Recent Value   Patient Availability and HIPAA Verification    Does patient want to proceed with activity? Yes   HIPAA/medical authority confirmed? Yes   Relationship to patient of person spoken to? Self   Refill Screening Questions    Changes to allergies? No   Changes to medications? No   New conditions since last clinic visit? No   Unplanned office visit, urgent care, ED, or hospital admission in the last 4 weeks? No   How does patient/caregiver feel medication is working? Excellent   Financial problems or insurance changes? No   How many doses of your specialty medications were missed in the last 4 weeks? 0   Would patient like to speak to a pharmacist? No   When does the patient need to receive the medication? 03/31/23   Refill Delivery Questions    How will the patient receive the medication? MEDRx   When does the patient need to receive the medication? 03/31/23   Shipping Address Home   Address in [facility] confirmed and updated if neccessary? Yes   Expected Copay ($) 0   Is the patient able to afford the medication copay? Yes   Payment Method zero copay   Days supply of Refill 30   Supplies needed? No supplies needed   Refill activity completed? Yes   Refill activity plan Refill scheduled   Shipment/Pickup Date: 03/28/23            Current Outpatient Medications   Medication Sig    bisoprolol (ZEBETA) 5 MG tablet Take 1 tablet (5 mg total) by mouth once daily.    cyanocobalamin 1,000 mcg/mL injection Inject 1 mL (1,000 mcg total) into the muscle every 28 days.    diazePAM (VALIUM) 10 MG Tab Take 1 tablet (10 mg total) by mouth 3 (three) times daily.    diclofenac sodium (VOLTAREN) 1 % Gel Apply 2 g topically 4  (four) times daily.    docusate calcium (SURFAK) 240 mg capsule Take 240 mg by mouth once daily.    EScitalopram oxalate (LEXAPRO) 10 MG tablet Take 1 tablet (10 mg total) by mouth once daily.    EScitalopram oxalate (LEXAPRO) 5 MG Tab Take 5 mg by mouth once daily.    furosemide (LASIX) 20 MG tablet Take 1 tablet (20 mg total) by mouth once daily.    gabapentin (NEURONTIN) 300 MG capsule Take 1 capsule (300 mg total) by mouth 2 (two) times daily.    lisinopriL 10 MG tablet Take 1 tablet (10 mg total) by mouth once daily.    naloxone (NARCAN) 4 mg/actuation Spry each nostril if not breathing or not responsive    ondansetron (ZOFRAN-ODT) 4 MG TbDL Take 1 tablet (4 mg total) by mouth every 8 (eight) hours as needed (nausea).    oxyCODONE (ROXICODONE) 15 MG Tab Take 1 tablet (15 mg total) by mouth every 6 (six) hours as needed for Pain.    risperiDONE (RISPERDAL) 0.5 MG Tab Take 1 tablet (0.5 mg total) by mouth once daily. along with 1 mg daily    risperiDONE (RISPERDAL) 1 MG tablet Take 1 tablet (1 mg total) by mouth 2 (two) times daily.    senna (SENOKOT) 8.6 mg tablet Take 1 tablet by mouth daily as needed for Constipation.    spironolactone (ALDACTONE) 100 MG tablet Take 1 tablet (100 mg total) by mouth once daily.    tenofovir alafenamide (VEMLIDY) 25 mg Tab Take 1 tablet (25 mg total) by mouth once daily.   Last reviewed on 2/13/2023  4:32 PM by Marleen Sun MD    Review of patient's allergies indicates:   Allergen Reactions    Pcn [penicillins] Hives    Morphine Other (See Comments)     heartburn    Bupropion hcl Other (See Comments)    Demerol [meperidine]     Last reviewed on  3/23/2023 10:56 AM by Tia Hernandez      Tasks added this encounter   No tasks added.   Tasks due within next 3 months   3/27/2023 - Refill Call (Auto Added)     [facility]  [address] [place] [id number]  Phone: [phone]  Fax: [phone]

## 2023-03-27 NOTE — TELEPHONE ENCOUNTER
----- Message from Martell López sent at 3/27/2023 11:50 AM CDT -----  Regarding: call back  Pt call to reschedule appt    Call

## 2023-03-28 ENCOUNTER — TELEPHONE (OUTPATIENT)
Dept: HEMATOLOGY/ONCOLOGY | Facility: CLINIC | Age: 48
End: 2023-03-28
Payer: MEDICAID

## 2023-03-28 DIAGNOSIS — G89.4 CHRONIC PAIN SYNDROME: ICD-10-CM

## 2023-03-28 RX ORDER — OXYCODONE HYDROCHLORIDE 15 MG/1
15 TABLET ORAL EVERY 6 HOURS PRN
Qty: 24 TABLET | Refills: 0 | Status: SHIPPED | OUTPATIENT
Start: 2023-04-02 | End: 2023-04-03

## 2023-03-28 NOTE — TELEPHONE ENCOUNTER
Spoke with the pt and got the pt scheduled on 03/31. Pt verbalized and understanding.  ----- Message from Sheri Sarah RN sent at 3/28/2023  1:26 PM CDT -----    ----- Message -----  From: Subha Sheridan  Sent: 3/28/2023   1:21 PM CDT  To: [facility] Staff    Type: Need Medical Advice   Who Called: Marie Lugo callback number: [phone]  Additional Information: Patient missed 2 appointments and need to reschedule  Please call to further assist, Thanks

## 2023-03-28 NOTE — TELEPHONE ENCOUNTER
The patient's prescription has been approved and sent to   [facility]/pharmacy #[id number] - [place], [place] - [address] [place] [id number]  Phone: [phone] Fax: [phone]

## 2023-03-28 NOTE — TELEPHONE ENCOUNTER
----- Message from Cathy Mcghee MA sent at 3/28/2023  2:47 PM CDT -----  Regarding: partial rx recieved  Patient received 96 pills of oxycodone 15 mg on 3/11/23, which is a partial prescription of 120 pills. [facility] in [place] states that she needs new rx for the rest of rx.  [facility]  [phone]

## 2023-03-31 ENCOUNTER — TELEPHONE (OUTPATIENT)
Dept: HEPATOLOGY | Facility: CLINIC | Age: 48
End: 2023-03-31
Payer: MEDICAID

## 2023-03-31 ENCOUNTER — LAB VISIT (OUTPATIENT)
Dept: LAB | Facility: HOSPITAL | Age: 48
End: 2023-03-31
Attending: STUDENT IN AN ORGANIZED HEALTH CARE EDUCATION/TRAINING PROGRAM
Payer: MEDICAID

## 2023-03-31 ENCOUNTER — OFFICE VISIT (OUTPATIENT)
Dept: HEMATOLOGY/ONCOLOGY | Facility: CLINIC | Age: 48
End: 2023-03-31
Payer: MEDICAID

## 2023-03-31 VITALS
TEMPERATURE: 97 F | SYSTOLIC BLOOD PRESSURE: 105 MMHG | HEIGHT: 71 IN | WEIGHT: 206.56 LBS | OXYGEN SATURATION: 100 % | RESPIRATION RATE: 16 BRPM | BODY MASS INDEX: 28.92 KG/M2 | HEART RATE: 83 BPM | DIASTOLIC BLOOD PRESSURE: 66 MMHG

## 2023-03-31 DIAGNOSIS — D61.818 PANCYTOPENIA: ICD-10-CM

## 2023-03-31 DIAGNOSIS — D64.9 ANEMIA, UNSPECIFIED TYPE: Primary | ICD-10-CM

## 2023-03-31 DIAGNOSIS — K74.60 CIRRHOSIS OF LIVER WITHOUT ASCITES, UNSPECIFIED HEPATIC CIRRHOSIS TYPE: ICD-10-CM

## 2023-03-31 DIAGNOSIS — B18.1 CHRONIC HEPATITIS B: ICD-10-CM

## 2023-03-31 DIAGNOSIS — D64.9 ANEMIA, UNSPECIFIED TYPE: ICD-10-CM

## 2023-03-31 LAB
ALBUMIN SERPL BCP-MCNC: 3.4 G/DL (ref 3.5–5.2)
ALP SERPL-CCNC: 118 U/L (ref 55–135)
ALT SERPL W/O P-5'-P-CCNC: 122 U/L (ref 10–44)
ANION GAP SERPL CALC-SCNC: 13 MMOL/L (ref 8–16)
AST SERPL-CCNC: 118 U/L (ref 10–40)
BASOPHILS # BLD AUTO: 0.03 K/UL (ref 0–0.2)
BASOPHILS NFR BLD: 1.1 % (ref 0–1.9)
BILIRUB SERPL-MCNC: 0.7 MG/DL (ref 0.1–1)
BUN SERPL-MCNC: 45 MG/DL (ref 6–20)
CALCIUM SERPL-MCNC: 9.4 MG/DL (ref 8.7–10.5)
CHLORIDE SERPL-SCNC: 96 MMOL/L (ref 95–110)
CO2 SERPL-SCNC: 24 MMOL/L (ref 23–29)
CREAT SERPL-MCNC: 1.7 MG/DL (ref 0.5–1.4)
DIFFERENTIAL METHOD: ABNORMAL
EOSINOPHIL # BLD AUTO: 0.1 K/UL (ref 0–0.5)
EOSINOPHIL NFR BLD: 4.7 % (ref 0–8)
ERYTHROCYTE [DISTWIDTH] IN BLOOD BY AUTOMATED COUNT: 15.6 % (ref 11.5–14.5)
EST. GFR  (NO RACE VARIABLE): 36.5 ML/MIN/1.73 M^2
GLUCOSE SERPL-MCNC: 130 MG/DL (ref 70–110)
HCT VFR BLD AUTO: 31.8 % (ref 37–48.5)
HGB BLD-MCNC: 10.5 G/DL (ref 12–16)
IMM GRANULOCYTES # BLD AUTO: 0 K/UL (ref 0–0.04)
IMM GRANULOCYTES NFR BLD AUTO: 0 % (ref 0–0.5)
LYMPHOCYTES # BLD AUTO: 0.8 K/UL (ref 1–4.8)
LYMPHOCYTES NFR BLD: 27.4 % (ref 18–48)
MCH RBC QN AUTO: 28.9 PG (ref 27–31)
MCHC RBC AUTO-ENTMCNC: 33 G/DL (ref 32–36)
MCV RBC AUTO: 88 FL (ref 82–98)
MONOCYTES # BLD AUTO: 0.3 K/UL (ref 0.3–1)
MONOCYTES NFR BLD: 11.9 % (ref 4–15)
NEUTROPHILS # BLD AUTO: 1.5 K/UL (ref 1.8–7.7)
NEUTROPHILS NFR BLD: 54.9 % (ref 38–73)
NRBC BLD-RTO: 0 /100 WBC
PLATELET # BLD AUTO: 77 K/UL (ref 150–450)
PMV BLD AUTO: 10.5 FL (ref 9.2–12.9)
POTASSIUM SERPL-SCNC: 4.5 MMOL/L (ref 3.5–5.1)
PROT SERPL-MCNC: 7.5 G/DL (ref 6–8.4)
RBC # BLD AUTO: 3.63 M/UL (ref 4–5.4)
SODIUM SERPL-SCNC: 133 MMOL/L (ref 136–145)
WBC # BLD AUTO: 2.77 K/UL (ref 3.9–12.7)

## 2023-03-31 PROCEDURE — 4010F PR ACE/ARB THEARPY RXD/TAKEN: ICD-10-PCS | Mod: CPTII,,, | Performed by: STUDENT IN AN ORGANIZED HEALTH CARE EDUCATION/TRAINING PROGRAM

## 2023-03-31 PROCEDURE — 99214 PR OFFICE/OUTPT VISIT, EST, LEVL IV, 30-39 MIN: ICD-10-PCS | Mod: S$PBB,,, | Performed by: STUDENT IN AN ORGANIZED HEALTH CARE EDUCATION/TRAINING PROGRAM

## 2023-03-31 PROCEDURE — 1159F PR MEDICATION LIST DOCUMENTED IN MEDICAL RECORD: ICD-10-PCS | Mod: CPTII,,, | Performed by: STUDENT IN AN ORGANIZED HEALTH CARE EDUCATION/TRAINING PROGRAM

## 2023-03-31 PROCEDURE — 3078F PR MOST RECENT DIASTOLIC BLOOD PRESSURE < 80 MM HG: ICD-10-PCS | Mod: CPTII,,, | Performed by: STUDENT IN AN ORGANIZED HEALTH CARE EDUCATION/TRAINING PROGRAM

## 2023-03-31 PROCEDURE — 80053 COMPREHEN METABOLIC PANEL: CPT | Mod: PN | Performed by: STUDENT IN AN ORGANIZED HEALTH CARE EDUCATION/TRAINING PROGRAM

## 2023-03-31 PROCEDURE — 99999 PR PBB SHADOW E&M-EST. PATIENT-LVL IV: CPT | Mod: PBBFAC,,, | Performed by: STUDENT IN AN ORGANIZED HEALTH CARE EDUCATION/TRAINING PROGRAM

## 2023-03-31 PROCEDURE — 99214 OFFICE O/P EST MOD 30 MIN: CPT | Mod: S$PBB,,, | Performed by: STUDENT IN AN ORGANIZED HEALTH CARE EDUCATION/TRAINING PROGRAM

## 2023-03-31 PROCEDURE — 3078F DIAST BP <80 MM HG: CPT | Mod: CPTII,,, | Performed by: STUDENT IN AN ORGANIZED HEALTH CARE EDUCATION/TRAINING PROGRAM

## 2023-03-31 PROCEDURE — 1160F RVW MEDS BY RX/DR IN RCRD: CPT | Mod: CPTII,,, | Performed by: STUDENT IN AN ORGANIZED HEALTH CARE EDUCATION/TRAINING PROGRAM

## 2023-03-31 PROCEDURE — 3074F PR MOST RECENT SYSTOLIC BLOOD PRESSURE < 130 MM HG: ICD-10-PCS | Mod: CPTII,,, | Performed by: STUDENT IN AN ORGANIZED HEALTH CARE EDUCATION/TRAINING PROGRAM

## 2023-03-31 PROCEDURE — 1160F PR REVIEW ALL MEDS BY PRESCRIBER/CLIN PHARMACIST DOCUMENTED: ICD-10-PCS | Mod: CPTII,,, | Performed by: STUDENT IN AN ORGANIZED HEALTH CARE EDUCATION/TRAINING PROGRAM

## 2023-03-31 PROCEDURE — 4010F ACE/ARB THERAPY RXD/TAKEN: CPT | Mod: CPTII,,, | Performed by: STUDENT IN AN ORGANIZED HEALTH CARE EDUCATION/TRAINING PROGRAM

## 2023-03-31 PROCEDURE — 85025 COMPLETE CBC W/AUTO DIFF WBC: CPT | Mod: PN | Performed by: STUDENT IN AN ORGANIZED HEALTH CARE EDUCATION/TRAINING PROGRAM

## 2023-03-31 PROCEDURE — 99214 OFFICE O/P EST MOD 30 MIN: CPT | Mod: PBBFAC,PN | Performed by: STUDENT IN AN ORGANIZED HEALTH CARE EDUCATION/TRAINING PROGRAM

## 2023-03-31 PROCEDURE — 99999 PR PBB SHADOW E&M-EST. PATIENT-LVL IV: ICD-10-PCS | Mod: PBBFAC,,, | Performed by: STUDENT IN AN ORGANIZED HEALTH CARE EDUCATION/TRAINING PROGRAM

## 2023-03-31 PROCEDURE — 3008F BODY MASS INDEX DOCD: CPT | Mod: CPTII,,, | Performed by: STUDENT IN AN ORGANIZED HEALTH CARE EDUCATION/TRAINING PROGRAM

## 2023-03-31 PROCEDURE — 36415 COLL VENOUS BLD VENIPUNCTURE: CPT | Mod: PN | Performed by: STUDENT IN AN ORGANIZED HEALTH CARE EDUCATION/TRAINING PROGRAM

## 2023-03-31 PROCEDURE — 3074F SYST BP LT 130 MM HG: CPT | Mod: CPTII,,, | Performed by: STUDENT IN AN ORGANIZED HEALTH CARE EDUCATION/TRAINING PROGRAM

## 2023-03-31 PROCEDURE — 1159F MED LIST DOCD IN RCRD: CPT | Mod: CPTII,,, | Performed by: STUDENT IN AN ORGANIZED HEALTH CARE EDUCATION/TRAINING PROGRAM

## 2023-03-31 PROCEDURE — 3008F PR BODY MASS INDEX (BMI) DOCUMENTED: ICD-10-PCS | Mod: CPTII,,, | Performed by: STUDENT IN AN ORGANIZED HEALTH CARE EDUCATION/TRAINING PROGRAM

## 2023-03-31 NOTE — PROGRESS NOTES
"PATIENT: Marie Damon  MRN: [id number]  DATE: 4/8/2023      Diagnosis:   1. Anemia, unspecified type    2. Chronic hepatitis B    3. Cirrhosis of liver without ascites, unspecified hepatic cirrhosis type    4. Pancytopenia        Chief Complaint: Cirrhosis of liver without ascites, unspecified hepatic cir    Subjective:   HPI: Ms. Damon is a 47 y.o. female with HTN, anxiety, DDD, chronic low back pain who is seen today at the request of MAYRA Slaughter for a diagnosis of thrombocytopenia.     Had multiple blood work in 6/2022 with significant findings of Hep B s antigen positive, positive MICHAEL, and thrombocytopenia. She presented with significant swelling of her lower ext bilateral as well as distension of her abdomen. Patient was referred to see hepatology; attempted to schedule patient for an alex on 7/12/22 with Dr Mason was not successful since patient phone got hacked " per patient". Patient wanted "something done" in the clinic, explain to her that she needs to see a hepatology and she needs lasix with possible paracentesis and the fastest way to get this done is to go to the ER. Patient state that lasix made her "sleepy". Admitted     Today,saw hepatology and started on vemlidy for hepatitis B but lost follow up with them. Also increased in her risperidone dose which could explain her blood results     Hematological history:   6/3/22 she presented to the ER at [facility] for abdominal pain, nausea, and diarrhea.   CT A/P was done with the following findings: "The liver slightly decreased size and irregular contour indicating cirrhosis.  No discrete focal lesion.  Gallbladder removed.  Pancreas normal.  Stomach decompressed.  Spleen is severely enlarged measuring 17.5 cm across.  Numerous periportal, splenic hilar, mesenteric, omental collateral vessels.  Generalized hazy density throughout the mesentery more significant centrally.  Adrenal glands normal.  Kidneys normal size and contour with no hydronephrosis.  " "Aorta tapers normally.  No bowel obstruction, ascites, or significant lymphadenopathy seen.  Bladder and rectum normal.  Bones intact.  Lung bases clear."  Lab results show patient was anemic with Hgb 10.g/dL, low WBC at 2.61 and platelet count of 64k. Elevated Alk/Phos, AST, ALT. She was discharged with follow up referrals to GI and this office.   Was seen by GI at [facility] yesterday and was given Rx for Metronidazole that she has not yet started. No additional blood work was done at that visit. She is scheduled to go back in 2 weeks for follow up of results of stool studies.   No family history of hematologic cancers or blood disorders. Mother had thyroid cancer.   Personal history of cholecystectomy and hysterectomy.   She has not experienced recurrent infections or prolonged use of antibiotic.   Social history includes 33 pack year smoking history, minimal alcohol use. 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 She states her drug use last for a few years, she has not used illicit drugs in approximately 15 years. Denies high risk sexual activity.    Past Medical History:   Past Medical History:   Diagnosis Date    Bone spur     hip    HTN (hypertension)        Past Surgical HIstory:   Past Surgical History:   Procedure Laterality Date    CHOLECYSTECTOMY      HYSTERECTOMY         Family History: History reviewed. No pertinent family history.    Social History:  reports that she has been smoking cigarettes. She has been smoking an average of 1 pack per day. She has never used smokeless tobacco. She reports that she does not currently use alcohol. She reports that she does not currently use drugs.    Allergies:  Review of patient's allergies indicates:   Allergen Reactions    Pcn [penicillins] Hives    Morphine Other (See Comments)     heartburn    Bupropion hcl Other (See Comments)    Demerol [meperidine]        Medications:  Current Outpatient Medications   Medication Sig " Dispense Refill    bisoprolol (ZEBETA) 5 MG tablet Take 1 tablet (5 mg total) by mouth once daily. 30 tablet 2    diazePAM (VALIUM) 10 MG Tab Take 1 tablet (10 mg total) by mouth 3 (three) times daily. 90 tablet 1    EScitalopram oxalate (LEXAPRO) 10 MG tablet Take 1 tablet (10 mg total) by mouth once daily. 30 tablet 2    EScitalopram oxalate (LEXAPRO) 5 MG Tab Take 5 mg by mouth once daily.      furosemide (LASIX) 20 MG tablet Take 1 tablet (20 mg total) by mouth once daily. 30 tablet 2    gabapentin (NEURONTIN) 300 MG capsule Take 1 capsule (300 mg total) by mouth 2 (two) times daily. 60 capsule 2    lisinopriL 10 MG tablet Take 1 tablet (10 mg total) by mouth once daily. 90 tablet 1    naloxone (NARCAN) 4 mg/actuation Spry each nostril if not breathing or not responsive 1 each 2    ondansetron (ZOFRAN-ODT) 4 MG TbDL Take 1 tablet (4 mg total) by mouth every 8 (eight) hours as needed (nausea). 30 tablet 0    risperiDONE (RISPERDAL) 0.5 MG Tab Take 1 tablet (0.5 mg total) by mouth once daily. along with 1 mg daily 30 tablet 1    risperiDONE (RISPERDAL) 1 MG tablet Take 1 tablet (1 mg total) by mouth 2 (two) times daily. 60 tablet 2    tenofovir alafenamide (VEMLIDY) 25 mg Tab Take 1 tablet (25 mg total) by mouth once daily. 30 tablet 11    cyanocobalamin 1,000 mcg/mL injection Inject 1 mL (1,000 mcg total) into the muscle every 28 days. (Patient not taking: Reported on 3/31/2023) 30 mL 0    diclofenac sodium (VOLTAREN) 1 % Gel Apply 2 g topically 4 (four) times daily. (Patient not taking: Reported on 3/31/2023) 100 g 0    docusate calcium (SURFAK) 240 mg capsule Take 240 mg by mouth once daily.      oxyCODONE (ROXICODONE) 15 MG Tab Take 1 tablet (15 mg total) by mouth every 6 (six) hours as needed for Pain. 120 tablet 0    senna (SENOKOT) 8.6 mg tablet Take 1 tablet by mouth daily as needed for Constipation. (Patient not taking: Reported on 3/31/2023) 30 tablet 2    spironolactone (ALDACTONE) 100 MG tablet Take 1  "tablet (100 mg total) by mouth once daily. (Patient not taking: Reported on 3/31/2023) 30 tablet 5     No current facility-administered medications for this visit.       Review of Systems   Constitutional:  Positive for activity change, appetite change and fatigue. Negative for chills and fever.   HENT:  Negative for nosebleeds and trouble swallowing.    Respiratory:  Negative for cough and shortness of breath.    Cardiovascular:  Negative for chest pain, palpitations and leg swelling.   Gastrointestinal:  Positive for abdominal pain, diarrhea and nausea. Negative for abdominal distention, constipation and vomiting.   Genitourinary:  Negative for dysuria and hematuria.   Musculoskeletal:  Positive for back pain.   Skin:  Negative for pallor and rash.   Neurological:  Negative for light-headedness and headaches.   Hematological:  Negative for adenopathy. Does not bruise/bleed easily.   Psychiatric/Behavioral:  The patient is nervous/anxious.        Objective:      Vitals:   Vitals:    03/31/23 1445   BP: 105/66   BP Location: Left arm   Patient Position: Sitting   BP Method: Medium (Automatic)   Pulse: 83   Resp: 16   Temp: 96.6 °F (35.9 °C)   TempSrc: Temporal   SpO2: 100%   Weight: 93.7 kg (206 lb 9.1 oz)   Height: 5' 11" (1.803 m)     BMI: Body mass index is 28.81 kg/m².    Physical Exam  Vitals reviewed.   Constitutional:       General: She is not in acute distress.     Appearance: She is not diaphoretic.   HENT:      Head: Normocephalic and atraumatic.      Mouth/Throat:      Mouth: Mucous membranes are moist.   Eyes:      General: No scleral icterus.  Cardiovascular:      Rate and Rhythm: Normal rate and regular rhythm.      Pulses: Normal pulses.      Heart sounds: Normal heart sounds. No murmur heard.  Pulmonary:      Effort: Pulmonary effort is normal. No respiratory distress.      Breath sounds: Normal breath sounds. No wheezing or rhonchi.   Abdominal:      General: Bowel sounds are normal. There is " distension.      Palpations: Abdomen is soft. There is no mass.      Tenderness: There is no abdominal tenderness. There is no guarding or rebound.   Musculoskeletal:         General: Swelling and tenderness present.      Cervical back: No tenderness.   Lymphadenopathy:      Cervical: No cervical adenopathy.   Skin:     General: Skin is dry.      Coloration: Skin is not jaundiced.      Findings: No bruising or rash.   Neurological:      Mental Status: She is alert and oriented to person, place, and time.      Gait: Gait normal.   Psychiatric:         Mood and Affect: Mood normal.         Behavior: Behavior normal.       Laboratory Data:  Lab Results   Component Value Date    WBC 2.77 (L) 03/31/2023    HGB 10.5 (L) 03/31/2023    HCT 31.8 (L) 03/31/2023    MCV 88 03/31/2023    PLT 77 (L) 03/31/2023      CMP  Sodium   Date Value Ref Range Status   03/31/2023 133 (L) 136 - 145 mmol/L Final     Potassium   Date Value Ref Range Status   03/31/2023 4.5 3.5 - 5.1 mmol/L Final     Chloride   Date Value Ref Range Status   03/31/2023 96 95 - 110 mmol/L Final     CO2   Date Value Ref Range Status   03/31/2023 24 23 - 29 mmol/L Final     Glucose   Date Value Ref Range Status   03/31/2023 130 (H) 70 - 110 mg/dL Final     BUN   Date Value Ref Range Status   03/31/2023 45 (H) 6 - 20 mg/dL Final     Creatinine   Date Value Ref Range Status   03/31/2023 1.7 (H) 0.5 - 1.4 mg/dL Final     Calcium   Date Value Ref Range Status   03/31/2023 9.4 8.7 - 10.5 mg/dL Final     Total Protein   Date Value Ref Range Status   03/31/2023 7.5 6.0 - 8.4 g/dL Final     Albumin   Date Value Ref Range Status   03/31/2023 3.4 (L) 3.5 - 5.2 g/dL Final     Total Bilirubin   Date Value Ref Range Status   03/31/2023 0.7 0.1 - 1.0 mg/dL Final     Comment:     For infants and newborns, interpretation of results should be based  on gestational age, weight and in agreement with clinical  observations.    Premature Infant recommended reference ranges:  Up to 24  hours.............<8.0 mg/dL  Up to 48 hours............<12.0 mg/dL  3-5 days..................<15.0 mg/dL  6-29 days.................<15.0 mg/dL       Alkaline Phosphatase   Date Value Ref Range Status   03/31/2023 118 55 - 135 U/L Final     AST   Date Value Ref Range Status   03/31/2023 118 (H) 10 - 40 U/L Final     ALT   Date Value Ref Range Status   03/31/2023 122 (H) 10 - 44 U/L Final     Anion Gap   Date Value Ref Range Status   03/31/2023 13 8 - 16 mmol/L Final     eGFR if    Date Value Ref Range Status   06/15/2022 >60 >60 mL/min/1.73 m^2 Final     eGFR if non    Date Value Ref Range Status   06/15/2022 >60 >60 mL/min/1.73 m^2 Final     Comment:     Calculation used to obtain the estimated glomerular filtration  rate (eGFR) is the CKD-EPI equation.            Imaging:   EXAMINATION:  CT ABDOMEN PELVIS WITHOUT CONTRAST     CLINICAL HISTORY:  Abdominal pain, acute, nonlocalized;     TECHNIQUE:  Low dose axial images, sagittal and coronal reformations were obtained from the lung bases to the pubic symphysis.  .  Oral contrast not given.  .  Automated exposure control used.     COMPARISON:  None     FINDINGS:  The liver slightly decreased size and irregular contour indicating cirrhosis.  No discrete focal lesion.  Gallbladder removed.  Pancreas normal.  Stomach decompressed.  Spleen is severely enlarged measuring 17.5 cm across.  Numerous periportal, splenic hilar, mesenteric, omental collateral vessels.  Generalized hazy density throughout the mesentery more significant centrally.  Adrenal glands normal.  Kidneys normal size and contour with no hydronephrosis.  Aorta tapers normally.  No bowel obstruction, ascites, or significant lymphadenopathy seen.  Bladder and rectum normal.  Bones intact.  Lung bases clear.     Impression:     Generalized mesenteric edema versus mesenteric adenitis.     Findings consistent with cirrhosis and portal hypertension including numerous  collateral vessels throughout the abdomen and periportal region and severe splenomegaly.        Electronically signed by: Woody Mckinley MD  Date:                                            06/03/2022  Time:                                           16:06    Assessment:       1. Anemia, unspecified type    2. Chronic hepatitis B    3. Cirrhosis of liver without ascites, unspecified hepatic cirrhosis type    4. Pancytopenia             Plan:     Pancytopenia likely due to acute hepatitis infection/ decompensation   - likely due to the below (cirrhosis, active viral infection) now with possible decompensation (GI bleeding; again)  - Hep B is improving, CBC was done without Diff ; unkown ANC, stable plts and Hb dropping, patient will see GI soon,  - explain to patient she needs to be seen and undergo scopes to make sure no GI bleeding/varicose/ulcer , waiting on a clearance from GI, reach out to hepatology team ( patient missed several elisha )  - no need for blood transfusion, no infection, no fever      Cirrhosis/hepatitis B; anasarca   -CT A/P shows splenomegaly and findings consistent with cirrhosis  -Discussed CT results with patient and need for further work up with multiple blood tests  -Hx of IV drug use certainly puts her at risk for underlying viral cause , negative HIV, hepatitis panel positive for HepB surface and C IgM, and recd to get Hep B DNA PCR; given her the diagnosis of possible acute hepatitis B   - following up with  hepatology ; Dr Mason staff to rescheduling her as soon as possible  - LFT's likely due to her cirrhosis/medication/increase dose of respiradone     Chronic pain syndrome: pain medications per primary care.     Patient queried and all questions were answered.    Route Chart for Scheduling    Med Onc Chart Routing      Follow up with physician 6 months. needs follow up elisha with hepatology with blood work prior CBC/CMP   Follow up with ELISHA    Infusion scheduling note    Injection  scheduling note    Labs    Imaging    Pharmacy appointment    Other referrals

## 2023-03-31 NOTE — TELEPHONE ENCOUNTER
----- Message from Sheri Sarah RN sent at 3/31/2023  3:08 PM CDT -----  Regarding: Appointment  Dr Bailon is seeing the attached patient in clinic and is requesting that the patient be seen by Dr Mason soon. If possible, virtually because the patient has been having transportation issues causing her to miss scheduled appointments. Her CMP from today does not look good. Please let us know if you have any questions. Thanks.

## 2023-04-03 ENCOUNTER — TELEPHONE (OUTPATIENT)
Dept: FAMILY MEDICINE | Facility: CLINIC | Age: 48
End: 2023-04-03

## 2023-04-03 DIAGNOSIS — G89.4 CHRONIC PAIN SYNDROME: ICD-10-CM

## 2023-04-03 RX ORDER — OXYCODONE HYDROCHLORIDE 15 MG/1
15 TABLET ORAL EVERY 6 HOURS PRN
Qty: 120 TABLET | Refills: 0 | Status: SHIPPED | OUTPATIENT
Start: 2023-04-03 | End: 2023-05-01 | Stop reason: SDUPTHER

## 2023-04-03 NOTE — TELEPHONE ENCOUNTER
Patient was shorted 24 pills so we dated an Rx for @24 for yesterday but it's coming back for PA so pharmacy said we need to just do her #120 as she is due   
The patient's prescription has been approved and sent to   [facility]/pharmacy #[id number] - [place], [place] - [address] [place] [id number]  Phone: [phone] Fax: [phone]  
Patent

## 2023-04-03 NOTE — TELEPHONE ENCOUNTER
----- Message from Trista Crespo sent at 4/3/2023 12:41 PM CDT -----  Please send refill for Oxycodone to pharmacy with qty 120. The 24 will not go through because it's being treated as two fills in one month.  Pt will not be able to get the qty 120 if they fill the qty 24 per insurance. Ticket #[id number].

## 2023-04-03 NOTE — TELEPHONE ENCOUNTER
----- Message from Cathy Diaz sent at 4/3/2023 12:01 PM CDT -----  [facility] called and stated that R[facility]ONE. [phone]

## 2023-04-05 ENCOUNTER — TELEPHONE (OUTPATIENT)
Dept: HEPATOLOGY | Facility: CLINIC | Age: 48
End: 2023-04-05
Payer: MEDICAID

## 2023-04-06 ENCOUNTER — TELEPHONE (OUTPATIENT)
Dept: HEPATOLOGY | Facility: CLINIC | Age: 48
End: 2023-04-06
Payer: MEDICAID

## 2023-04-08 PROBLEM — D64.9 ANEMIA: Status: ACTIVE | Noted: 2022-08-23

## 2023-04-11 ENCOUNTER — PATIENT MESSAGE (OUTPATIENT)
Dept: ADMINISTRATIVE | Facility: HOSPITAL | Age: 48
End: 2023-04-11
Payer: MEDICAID

## 2023-04-14 ENCOUNTER — TELEPHONE (OUTPATIENT)
Dept: PHARMACY | Facility: CLINIC | Age: 48
End: 2023-04-14
Payer: MEDICAID

## 2023-04-14 ENCOUNTER — SPECIALTY PHARMACY (OUTPATIENT)
Dept: PHARMACY | Facility: CLINIC | Age: 48
End: 2023-04-14
Payer: MEDICAID

## 2023-04-14 NOTE — TELEPHONE ENCOUNTER
Incoming call from pt stating she is out of Vemlidy. Informed pt that RTS until 4/22 since she received 3/29. Pt stated she did not receive box. Checked all around house, and was no where to be found. Outgoing call to pts plan. Spoke with Maggi who was able to perform a lost med override. Will reach out to pt to set up delivery.

## 2023-04-14 NOTE — TELEPHONE ENCOUNTER
Specialty Pharmacy - Refill Coordination    Specialty Medication Orders Linked to Encounter      Flowsheet Row Most Recent Value   Medication #1 tenofovir alafenamide (VEMLIDY) 25 mg Tab (Order#[id number], Rx#[id number])            Refill Questions - Documented Responses      Flowsheet Row Most Recent Value   Patient Availability and HIPAA Verification    Does patient want to proceed with activity? Yes   HIPAA/medical authority confirmed? Yes   Relationship to patient of person spoken to? Self   Refill Screening Questions    Changes to allergies? No   Changes to medications? No   New conditions since last clinic visit? No   Unplanned office visit, urgent care, ED, or hospital admission in the last 4 weeks? No   How does patient/caregiver feel medication is working? Good   Financial problems or insurance changes? No   How many doses of your specialty medications were missed in the last 4 weeks? 0   Would patient like to speak to a pharmacist? No   When does the patient need to receive the medication? 04/17/23   Refill Delivery Questions    How will the patient receive the medication? MEDRx   When does the patient need to receive the medication? 04/17/23   Shipping Address Home   Address in [facility] confirmed and updated if neccessary? Yes   Expected Copay ($) 0   Is the patient able to afford the medication copay? Yes   Payment Method zero copay   Days supply of Refill 30   Supplies needed? No supplies needed   Refill activity completed? Yes   Refill activity plan Refill scheduled   Shipment/Pickup Date: 04/14/23            Current Outpatient Medications   Medication Sig    bisoprolol (ZEBETA) 5 MG tablet Take 1 tablet (5 mg total) by mouth once daily.    cyanocobalamin 1,000 mcg/mL injection Inject 1 mL (1,000 mcg total) into the muscle every 28 days. (Patient not taking: Reported on 3/31/2023)    diazePAM (VALIUM) 10 MG Tab Take 1 tablet (10 mg total) by mouth 3 (three) times daily.    diclofenac sodium  (VOLTAREN) 1 % Gel Apply 2 g topically 4 (four) times daily. (Patient not taking: Reported on 3/31/2023)    docusate calcium (SURFAK) 240 mg capsule Take 240 mg by mouth once daily.    EScitalopram oxalate (LEXAPRO) 10 MG tablet Take 1 tablet (10 mg total) by mouth once daily.    EScitalopram oxalate (LEXAPRO) 5 MG Tab Take 5 mg by mouth once daily.    furosemide (LASIX) 20 MG tablet Take 1 tablet (20 mg total) by mouth once daily.    gabapentin (NEURONTIN) 300 MG capsule Take 1 capsule (300 mg total) by mouth 2 (two) times daily.    lisinopriL 10 MG tablet Take 1 tablet (10 mg total) by mouth once daily.    naloxone (NARCAN) 4 mg/actuation Spry each nostril if not breathing or not responsive    ondansetron (ZOFRAN-ODT) 4 MG TbDL Take 1 tablet (4 mg total) by mouth every 8 (eight) hours as needed (nausea).    oxyCODONE (ROXICODONE) 15 MG Tab Take 1 tablet (15 mg total) by mouth every 6 (six) hours as needed for Pain.    risperiDONE (RISPERDAL) 0.5 MG Tab Take 1 tablet (0.5 mg total) by mouth once daily. along with 1 mg daily    risperiDONE (RISPERDAL) 1 MG tablet Take 1 tablet (1 mg total) by mouth 2 (two) times daily.    senna (SENOKOT) 8.6 mg tablet Take 1 tablet by mouth daily as needed for Constipation. (Patient not taking: Reported on 3/31/2023)    spironolactone (ALDACTONE) 100 MG tablet Take 1 tablet (100 mg total) by mouth once daily. (Patient not taking: Reported on 3/31/2023)    tenofovir alafenamide (VEMLIDY) 25 mg Tab Take 1 tablet (25 mg total) by mouth once daily.   Last reviewed on 4/8/2023  3:33 PM by Erna Bailon MD    Review of patient's allergies indicates:   Allergen Reactions    Pcn [penicillins] Hives    Morphine Other (See Comments)     heartburn    Bupropion hcl Other (See Comments)    Demerol [meperidine]     Last reviewed on  4/8/2023 3:33 PM by Erna Hajja      Tasks added this encounter   5/10/2023 - Refill Call (Auto Added)   Tasks due within next 3 months   No tasks due.     Radha  Tae, PharmD  [facility]  [address]  [facility] [address]  Phone: [phone]  Fax: [phone]

## 2023-04-18 ENCOUNTER — TELEPHONE (OUTPATIENT)
Dept: FAMILY MEDICINE | Facility: CLINIC | Age: 48
End: 2023-04-18

## 2023-04-18 NOTE — TELEPHONE ENCOUNTER
Spoke to patient. Advised she has refills on file at pharmacy on all her meds but her oxycodone, she will have to ask pharmacy when is the earliest insurance will cover refills for the meds she needs. Told her that her pain med isnt due until May 2.

## 2023-04-18 NOTE — TELEPHONE ENCOUNTER
----- Message from Cathy Diaz sent at 4/18/2023  9:26 AM CDT -----  Pt states she will be out of town for a while and would like to get her meds refilled early. [phone]

## 2023-04-20 ENCOUNTER — TELEPHONE (OUTPATIENT)
Dept: HEPATOLOGY | Facility: CLINIC | Age: 48
End: 2023-04-20
Payer: MEDICAID

## 2023-04-20 NOTE — TELEPHONE ENCOUNTER
Call returned to the patient at [phone].    Inquired how the patient was doing?  Patient stated not too well?  What seems to be the problem?    My stomach is swollen and I had blood in my stool.  When did this occur? Day before yesterday (Tues).  I had diarrhea and when I wiped myself I saw blood on the tissue. Was it bloody stool? I don't know. Did you look in the toilet? No.  Do you have hemorrhoids? Yes.    It this occurs again you need to go to the ED,  What did your GI Dr say?   I haven't seen her in awhile.  She told me to come back after I see Dr Mason.  I need a paper saying they can put me to sleep for the test.    I don't see a note from the Dr? Is she  outside of [facility]? Yes.  Bring the form with you when you come to your Office Visit on Fri 4/28/23.    I don't have a form. I was just going to ask him.  You can discuss all of that with your visit when you come in.  I will forward your message to the .

## 2023-04-20 NOTE — TELEPHONE ENCOUNTER
----- Message from Janae Robison MA sent at 4/19/2023  3:44 PM CDT -----  Regarding: FW: Medical Clearance  Contact: Pt    ----- Message -----  From: Brunilda Garcia  Sent: 4/19/2023   3:43 PM CDT  To: Marlon Nolen Staff  Subject: Medical Clearance                                Pt is requesting a callback in regards to a medical clearance for GI provider. Pt was informed with fax number to have medical clearance form faxed over. Please adv pt        Confirmed contact below:   Contact Name:Marie Damon  Phone Number: [phone]  or [phone]

## 2023-04-28 ENCOUNTER — LAB VISIT (OUTPATIENT)
Dept: LAB | Facility: HOSPITAL | Age: 48
End: 2023-04-28
Attending: INTERNAL MEDICINE
Payer: MEDICAID

## 2023-04-28 ENCOUNTER — OFFICE VISIT (OUTPATIENT)
Dept: HEPATOLOGY | Facility: CLINIC | Age: 48
End: 2023-04-28
Payer: MEDICAID

## 2023-04-28 ENCOUNTER — TELEPHONE (OUTPATIENT)
Dept: HEPATOLOGY | Facility: CLINIC | Age: 48
End: 2023-04-28
Payer: MEDICAID

## 2023-04-28 VITALS — WEIGHT: 203.69 LBS | BODY MASS INDEX: 28.52 KG/M2 | RESPIRATION RATE: 18 BRPM | HEIGHT: 71 IN

## 2023-04-28 DIAGNOSIS — B18.1 CHRONIC VIRAL HEPATITIS B WITHOUT DELTA AGENT AND WITHOUT COMA: ICD-10-CM

## 2023-04-28 DIAGNOSIS — B18.1 CHRONIC VIRAL HEPATITIS B WITHOUT DELTA AGENT AND WITHOUT COMA: Primary | ICD-10-CM

## 2023-04-28 LAB
AFP SERPL-MCNC: 3.1 NG/ML (ref 0–8.4)
ALBUMIN SERPL BCP-MCNC: 3.8 G/DL (ref 3.5–5.2)
ALP SERPL-CCNC: 126 U/L (ref 55–135)
ALT SERPL W/O P-5'-P-CCNC: 40 U/L (ref 10–44)
ANION GAP SERPL CALC-SCNC: 10 MMOL/L (ref 8–16)
AST SERPL-CCNC: 60 U/L (ref 10–40)
BILIRUB SERPL-MCNC: 1 MG/DL (ref 0.1–1)
BUN SERPL-MCNC: 31 MG/DL (ref 6–20)
CALCIUM SERPL-MCNC: 9.4 MG/DL (ref 8.7–10.5)
CHLORIDE SERPL-SCNC: 102 MMOL/L (ref 95–110)
CO2 SERPL-SCNC: 27 MMOL/L (ref 23–29)
CREAT SERPL-MCNC: 1.3 MG/DL (ref 0.5–1.4)
ERYTHROCYTE [DISTWIDTH] IN BLOOD BY AUTOMATED COUNT: 14.7 % (ref 11.5–14.5)
EST. GFR  (NO RACE VARIABLE): 50.4 ML/MIN/1.73 M^2
GLUCOSE SERPL-MCNC: 94 MG/DL (ref 70–110)
HCT VFR BLD AUTO: 32.8 % (ref 37–48.5)
HCV AB SERPL QL IA: NORMAL
HGB BLD-MCNC: 10.7 G/DL (ref 12–16)
MCH RBC QN AUTO: 28.5 PG (ref 27–31)
MCHC RBC AUTO-ENTMCNC: 32.6 G/DL (ref 32–36)
MCV RBC AUTO: 88 FL (ref 82–98)
PLATELET # BLD AUTO: 78 K/UL (ref 150–450)
PMV BLD AUTO: 10.1 FL (ref 9.2–12.9)
POTASSIUM SERPL-SCNC: 4.3 MMOL/L (ref 3.5–5.1)
PROT SERPL-MCNC: 7.8 G/DL (ref 6–8.4)
RBC # BLD AUTO: 3.75 M/UL (ref 4–5.4)
SODIUM SERPL-SCNC: 139 MMOL/L (ref 136–145)
WBC # BLD AUTO: 2.88 K/UL (ref 3.9–12.7)

## 2023-04-28 PROCEDURE — 3008F BODY MASS INDEX DOCD: CPT | Mod: CPTII,,, | Performed by: INTERNAL MEDICINE

## 2023-04-28 PROCEDURE — 99214 OFFICE O/P EST MOD 30 MIN: CPT | Mod: PBBFAC | Performed by: INTERNAL MEDICINE

## 2023-04-28 PROCEDURE — 86692 HEPATITIS DELTA AGENT ANTBDY: CPT | Performed by: INTERNAL MEDICINE

## 2023-04-28 PROCEDURE — 80053 COMPREHEN METABOLIC PANEL: CPT | Performed by: INTERNAL MEDICINE

## 2023-04-28 PROCEDURE — 4010F ACE/ARB THERAPY RXD/TAKEN: CPT | Mod: CPTII,,, | Performed by: INTERNAL MEDICINE

## 2023-04-28 PROCEDURE — 1159F PR MEDICATION LIST DOCUMENTED IN MEDICAL RECORD: ICD-10-PCS | Mod: CPTII,,, | Performed by: INTERNAL MEDICINE

## 2023-04-28 PROCEDURE — 87340 HEPATITIS B SURFACE AG IA: CPT | Performed by: INTERNAL MEDICINE

## 2023-04-28 PROCEDURE — 1160F PR REVIEW ALL MEDS BY PRESCRIBER/CLIN PHARMACIST DOCUMENTED: ICD-10-PCS | Mod: CPTII,,, | Performed by: INTERNAL MEDICINE

## 2023-04-28 PROCEDURE — 85027 COMPLETE CBC AUTOMATED: CPT | Performed by: INTERNAL MEDICINE

## 2023-04-28 PROCEDURE — 82105 ALPHA-FETOPROTEIN SERUM: CPT | Performed by: INTERNAL MEDICINE

## 2023-04-28 PROCEDURE — 1159F MED LIST DOCD IN RCRD: CPT | Mod: CPTII,,, | Performed by: INTERNAL MEDICINE

## 2023-04-28 PROCEDURE — 86382 NEUTRALIZATION TEST VIRAL: CPT | Performed by: INTERNAL MEDICINE

## 2023-04-28 PROCEDURE — 4010F PR ACE/ARB THEARPY RXD/TAKEN: ICD-10-PCS | Mod: CPTII,,, | Performed by: INTERNAL MEDICINE

## 2023-04-28 PROCEDURE — 1160F RVW MEDS BY RX/DR IN RCRD: CPT | Mod: CPTII,,, | Performed by: INTERNAL MEDICINE

## 2023-04-28 PROCEDURE — 99215 PR OFFICE/OUTPT VISIT, EST, LEVL V, 40-54 MIN: ICD-10-PCS | Mod: S$PBB,,, | Performed by: INTERNAL MEDICINE

## 2023-04-28 PROCEDURE — 87517 HEPATITIS B DNA QUANT: CPT | Performed by: INTERNAL MEDICINE

## 2023-04-28 PROCEDURE — 36415 COLL VENOUS BLD VENIPUNCTURE: CPT | Performed by: INTERNAL MEDICINE

## 2023-04-28 PROCEDURE — 99999 PR PBB SHADOW E&M-EST. PATIENT-LVL IV: ICD-10-PCS | Mod: PBBFAC,,, | Performed by: INTERNAL MEDICINE

## 2023-04-28 PROCEDURE — 99215 OFFICE O/P EST HI 40 MIN: CPT | Mod: S$PBB,,, | Performed by: INTERNAL MEDICINE

## 2023-04-28 PROCEDURE — 3008F PR BODY MASS INDEX (BMI) DOCUMENTED: ICD-10-PCS | Mod: CPTII,,, | Performed by: INTERNAL MEDICINE

## 2023-04-28 PROCEDURE — 86803 HEPATITIS C AB TEST: CPT | Performed by: INTERNAL MEDICINE

## 2023-04-28 PROCEDURE — 99999 PR PBB SHADOW E&M-EST. PATIENT-LVL IV: CPT | Mod: PBBFAC,,, | Performed by: INTERNAL MEDICINE
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
## 2023-04-28 NOTE — TELEPHONE ENCOUNTER
----- Message from Lia Green sent at 4/28/2023  8:33 AM CDT -----  Regarding: earlier appt time today?  The patient mom called returning missed call. Please reach out to mom asap - mom herself has an appt at 10:30 and the patient uses moms phone so mom will be taking the phone w/her to her appt at 10:30  They are coming from [place] so need to know as soon as possible regarding the appt time change to confirm times    No further information provided      Patient mom can be contacted @# [phone] (home)

## 2023-04-29 LAB
HBSAG CONFIRMATION: ABNORMAL
HBV SURFACE AG SERPL QL IA: REACTIVE

## 2023-05-01 DIAGNOSIS — G89.4 CHRONIC PAIN SYNDROME: ICD-10-CM

## 2023-05-01 LAB — HDV AB SER QL IA: NEGATIVE

## 2023-05-01 NOTE — TELEPHONE ENCOUNTER
----- Message from Cathy Mcghee MA sent at 5/1/2023  2:11 PM CDT -----  Regarding: REFILL  Needs refill on oxycodone    [facility]  [phone]

## 2023-05-02 LAB
HBV DNA SERPL NAA+PROBE-ACNC: <10 IU/ML
HBV DNA SERPL NAA+PROBE-LOG IU: <1 LOG (10) IU/ML
HBV DNA SERPL QL NAA+PROBE: DETECTED

## 2023-05-02 RX ORDER — OXYCODONE HYDROCHLORIDE 15 MG/1
15 TABLET ORAL EVERY 6 HOURS PRN
Qty: 120 TABLET | Refills: 0 | Status: SHIPPED | OUTPATIENT
Start: 2023-05-03 | End: 2023-05-31 | Stop reason: SDUPTHER

## 2023-05-02 NOTE — TELEPHONE ENCOUNTER
prescription sent to   [facility]/pharmacy #[id number] - [place], [place] - [facility] [address]  Phone: [phone] Fax: [phone]

## 2023-05-08 ENCOUNTER — HOSPITAL ENCOUNTER (OUTPATIENT)
Dept: RADIOLOGY | Facility: HOSPITAL | Age: 48
Discharge: HOME | End: 2023-05-08
Attending: INTERNAL MEDICINE
Payer: MEDICAID

## 2023-05-08 DIAGNOSIS — B18.1 CHRONIC VIRAL HEPATITIS B WITHOUT DELTA AGENT AND WITHOUT COMA: ICD-10-CM

## 2023-05-08 PROCEDURE — 76700 US EXAM ABDOM COMPLETE: CPT | Mod: 26,,, | Performed by: RADIOLOGY

## 2023-05-08 PROCEDURE — 76700 US ABDOMEN COMPLETE: ICD-10-PCS | Mod: 26,,, | Performed by: RADIOLOGY

## 2023-05-08 PROCEDURE — 76700 US EXAM ABDOM COMPLETE: CPT | Mod: TC,PO

## 2023-05-10 ENCOUNTER — SPECIALTY PHARMACY (OUTPATIENT)
Dept: PHARMACY | Facility: CLINIC | Age: 48
End: 2023-05-10
Payer: MEDICAID

## 2023-05-10 ENCOUNTER — PATIENT MESSAGE (OUTPATIENT)
Dept: PHARMACY | Facility: CLINIC | Age: 48
End: 2023-05-10
Payer: MEDICAID

## 2023-05-10 NOTE — TELEPHONE ENCOUNTER
Specialty Pharmacy - Refill Coordination    Specialty Medication Orders Linked to Encounter      Flowsheet Row Most Recent Value   Medication #1 tenofovir alafenamide (VEMLIDY) 25 mg Tab (Order#[id number], Rx#[id number])            Refill Questions - Documented Responses      Flowsheet Row Most Recent Value   Refill Screening Questions    Changes to allergies? No   Changes to medications? No   New conditions since last clinic visit? No   Unplanned office visit, urgent care, ED, or hospital admission in the last 4 weeks? No   How does patient/caregiver feel medication is working? Good   Financial problems or insurance changes? No   How many doses of your specialty medications were missed in the last 4 weeks? 1   Why were doses missed? Simply forgot   Would patient like to speak to a pharmacist? No   When does the patient need to receive the medication? 05/18/23   Refill Delivery Questions    How will the patient receive the medication? MEDRx   When does the patient need to receive the medication? 05/18/23   Shipping Address Home   Address in [facility] confirmed and updated if neccessary? Yes   Expected Copay ($) 0   Is the patient able to afford the medication copay? Yes   Payment Method zero copay   Days supply of Refill 30   Supplies needed? No supplies needed   Refill activity completed? Yes   Refill activity plan Refill scheduled   Shipment/Pickup Date: 05/16/23            Current Outpatient Medications   Medication Sig    bisoprolol (ZEBETA) 5 MG tablet Take 1 tablet (5 mg total) by mouth once daily.    cyanocobalamin 1,000 mcg/mL injection Inject 1 mL (1,000 mcg total) into the muscle every 28 days. (Patient not taking: Reported on 3/31/2023)    diazePAM (VALIUM) 10 MG Tab Take 1 tablet (10 mg total) by mouth 3 (three) times daily.    diclofenac sodium (VOLTAREN) 1 % Gel Apply 2 g topically 4 (four) times daily. (Patient not taking: Reported on 3/31/2023)    docusate calcium (SURFAK) 240 mg capsule Take  240 mg by mouth once daily.    EScitalopram oxalate (LEXAPRO) 10 MG tablet Take 1 tablet (10 mg total) by mouth once daily.    EScitalopram oxalate (LEXAPRO) 5 MG Tab Take 5 mg by mouth once daily.    furosemide (LASIX) 20 MG tablet Take 1 tablet (20 mg total) by mouth once daily.    gabapentin (NEURONTIN) 300 MG capsule Take 1 capsule (300 mg total) by mouth 2 (two) times daily.    lisinopriL 10 MG tablet Take 1 tablet (10 mg total) by mouth once daily.    naloxone (NARCAN) 4 mg/actuation Spry each nostril if not breathing or not responsive    ondansetron (ZOFRAN-ODT) 4 MG TbDL Take 1 tablet (4 mg total) by mouth every 8 (eight) hours as needed (nausea).    oxyCODONE (ROXICODONE) 15 MG Tab Take 1 tablet (15 mg total) by mouth every 6 (six) hours as needed for Pain.    risperiDONE (RISPERDAL) 0.5 MG Tab Take 1 tablet (0.5 mg total) by mouth once daily. along with 1 mg daily    risperiDONE (RISPERDAL) 1 MG tablet Take 1 tablet (1 mg total) by mouth 2 (two) times daily.    senna (SENOKOT) 8.6 mg tablet Take 1 tablet by mouth daily as needed for Constipation. (Patient not taking: Reported on 3/31/2023)    spironolactone (ALDACTONE) 100 MG tablet Take 1 tablet (100 mg total) by mouth once daily. (Patient not taking: Reported on 3/31/2023)    tenofovir alafenamide (VEMLIDY) 25 mg Tab Take 1 tablet (25 mg total) by mouth once daily.   Last reviewed on 4/28/2023  3:29 PM by Callum Mason MD    Review of patient's allergies indicates:   Allergen Reactions    Pcn [penicillins] Hives    Morphine Other (See Comments)     heartburn    Bupropion hcl Other (See Comments)    Demerol [meperidine]     Last reviewed on  5/1/2023 2:26 PM by Tia Hernandez      Tasks added this encounter   No tasks added.   Tasks due within next 3 months   5/13/2023 - Refill Coordination Outreach (1 time occurrence)     CONOR KAMINSKI, PharmD  [facility]  [address] [facility] [address]  Phone:  [phone]  Fax: [phone]

## 2023-05-15 ENCOUNTER — OFFICE VISIT (OUTPATIENT)
Dept: FAMILY MEDICINE | Facility: CLINIC | Age: 48
End: 2023-05-15
Payer: MEDICAID

## 2023-05-15 VITALS
WEIGHT: 196 LBS | HEART RATE: 64 BPM | SYSTOLIC BLOOD PRESSURE: 94 MMHG | DIASTOLIC BLOOD PRESSURE: 52 MMHG | BODY MASS INDEX: 27.44 KG/M2 | HEIGHT: 71 IN | OXYGEN SATURATION: 99 %

## 2023-05-15 DIAGNOSIS — F31.32 BIPOLAR AFFECTIVE DISORDER, CURRENTLY DEPRESSED, MODERATE: ICD-10-CM

## 2023-05-15 DIAGNOSIS — M54.6 ACUTE MIDLINE THORACIC BACK PAIN: ICD-10-CM

## 2023-05-15 DIAGNOSIS — I10 ESSENTIAL HYPERTENSION: Primary | ICD-10-CM

## 2023-05-15 DIAGNOSIS — K92.1 MELENA: ICD-10-CM

## 2023-05-15 DIAGNOSIS — M54.50 CHRONIC LOW BACK PAIN, UNSPECIFIED BACK PAIN LATERALITY, UNSPECIFIED WHETHER SCIATICA PRESENT: ICD-10-CM

## 2023-05-15 DIAGNOSIS — G89.29 CHRONIC LOW BACK PAIN, UNSPECIFIED BACK PAIN LATERALITY, UNSPECIFIED WHETHER SCIATICA PRESENT: ICD-10-CM

## 2023-05-15 DIAGNOSIS — K05.10 GINGIVITIS DUE TO DENTAL PLAQUE: ICD-10-CM

## 2023-05-15 DIAGNOSIS — B18.1 CHRONIC HEPATITIS B: ICD-10-CM

## 2023-05-15 PROCEDURE — 1160F RVW MEDS BY RX/DR IN RCRD: CPT | Mod: CPTII,S$GLB,, | Performed by: FAMILY MEDICINE

## 2023-05-15 PROCEDURE — 3078F DIAST BP <80 MM HG: CPT | Mod: CPTII,S$GLB,, | Performed by: FAMILY MEDICINE

## 2023-05-15 PROCEDURE — 1159F PR MEDICATION LIST DOCUMENTED IN MEDICAL RECORD: ICD-10-PCS | Mod: CPTII,S$GLB,, | Performed by: FAMILY MEDICINE

## 2023-05-15 PROCEDURE — 3008F PR BODY MASS INDEX (BMI) DOCUMENTED: ICD-10-PCS | Mod: CPTII,S$GLB,, | Performed by: FAMILY MEDICINE

## 2023-05-15 PROCEDURE — 3078F PR MOST RECENT DIASTOLIC BLOOD PRESSURE < 80 MM HG: ICD-10-PCS | Mod: CPTII,S$GLB,, | Performed by: FAMILY MEDICINE

## 2023-05-15 PROCEDURE — 4010F PR ACE/ARB THEARPY RXD/TAKEN: ICD-10-PCS | Mod: CPTII,S$GLB,, | Performed by: FAMILY MEDICINE

## 2023-05-15 PROCEDURE — 3074F SYST BP LT 130 MM HG: CPT | Mod: CPTII,S$GLB,, | Performed by: FAMILY MEDICINE

## 2023-05-15 PROCEDURE — 99214 PR OFFICE/OUTPT VISIT, EST, LEVL IV, 30-39 MIN: ICD-10-PCS | Mod: S$GLB,,, | Performed by: FAMILY MEDICINE

## 2023-05-15 PROCEDURE — 3074F PR MOST RECENT SYSTOLIC BLOOD PRESSURE < 130 MM HG: ICD-10-PCS | Mod: CPTII,S$GLB,, | Performed by: FAMILY MEDICINE

## 2023-05-15 PROCEDURE — 3008F BODY MASS INDEX DOCD: CPT | Mod: CPTII,S$GLB,, | Performed by: FAMILY MEDICINE

## 2023-05-15 PROCEDURE — 4010F ACE/ARB THERAPY RXD/TAKEN: CPT | Mod: CPTII,S$GLB,, | Performed by: FAMILY MEDICINE

## 2023-05-15 PROCEDURE — 1160F PR REVIEW ALL MEDS BY PRESCRIBER/CLIN PHARMACIST DOCUMENTED: ICD-10-PCS | Mod: CPTII,S$GLB,, | Performed by: FAMILY MEDICINE

## 2023-05-15 PROCEDURE — 1159F MED LIST DOCD IN RCRD: CPT | Mod: CPTII,S$GLB,, | Performed by: FAMILY MEDICINE

## 2023-05-15 PROCEDURE — 99214 OFFICE O/P EST MOD 30 MIN: CPT | Mod: S$GLB,,, | Performed by: FAMILY MEDICINE

## 2023-05-15 RX ORDER — ESCITALOPRAM OXALATE 20 MG/1
20 TABLET ORAL DAILY
Qty: 30 TABLET | Refills: 11 | Status: SHIPPED | OUTPATIENT
Start: 2023-05-15 | End: 2023-08-21 | Stop reason: SDUPTHER

## 2023-05-15 RX ORDER — CLINDAMYCIN HYDROCHLORIDE 300 MG/1
300 CAPSULE ORAL EVERY 8 HOURS
Qty: 30 CAPSULE | Refills: 0 | Status: SHIPPED | OUTPATIENT
Start: 2023-05-15 | End: 2023-10-11

## 2023-05-15 NOTE — PROGRESS NOTES
SUBJECTIVE:    Patient ID: Marie Damon is a 47 y.o. female.    Chief Complaint: Hypertension (Anxiety, went over meds verbally// SW)      Pt seen in order to checkup on acute and chronic conditions.    BP is ok today. Denies CP/SOB/HA. Weight is stable.      Anxiety has been doing ok. Has been still taking valium. Has a care manager right now that is helping her to do things.     Pt has been to see GI ([facility])since her last visit. Has been having trouble with getting to her visits as she uses Medicaid transportation. Will likely need a GI    Pt states she is crying daily. Feels like no one cares about her as her kids are not checking on her. Her sister has to bring her to go see her children. But both of her son's did come and see her yesterday and that did make her feel better.  Pt living with her mom that screams at her all the time. Has been doing a little better with her sister. Doesn't have and appetite most of the time. Has an appt on 5/25/23, and has been changes to her risperdal meds. Has been having bad dreams and it also makes her feel groggy. Thinks lexapro works better, but needs to be increased.    Continues to have a anemia.   Stool has been black and smells like death.  Has not had a scope yet, has had ultrasound of the liver.  Has Hep B and possibly cirrhosis, now on vemlivy ([place]).  Continues to follow with  hematology ([facility]).     Pt continues to have chronic back pain. Takes oxycodone as needed for pain, which is not really helping at times. States she has been having trouble with balance, and has been falling a lot. A few weeks ago she fell and hit her head on her dresser and had to go to the hospital and told she had a concussion. Fell out of the bed once and hurt her left 2nd-3rd toes. Told to take alternative to tylenol due to hep B.   Has been getting sahil horses in the hands, legs and feet that hurt real bad, and she is drinking pickle juice for that. Has numbness or  tingling in the feet,  has been falling for the last 2-3 months.    Has been having issues with her teeth a lot, but can only get 3 teeth out per year. Has been using oragel.    Had labs done: Hct 32.8, +Hep B,  ----------------------------------------------------  Mammogram due, had to canceled 3 times due to fall.  Cscope due        Lab Visit on 04/28/2023   Component Date Value Ref Range Status    WBC 04/28/2023 2.88 (L)  3.90 - 12.70 K/uL Final    RBC 04/28/2023 3.75 (L)  4.00 - 5.40 M/uL Final    Hemoglobin 04/28/2023 10.7 (L)  12.0 - 16.0 g/dL Final    Hematocrit 04/28/2023 32.8 (L)  37.0 - 48.5 % Final    MCV 04/28/2023 88  82 - 98 fL Final    MCH 04/28/2023 28.5  27.0 - 31.0 pg Final    MCHC 04/28/2023 32.6  32.0 - 36.0 g/dL Final    RDW 04/28/2023 14.7 (H)  11.5 - 14.5 % Final    Platelets 04/28/2023 78 (L)  150 - 450 K/uL Final    MPV 04/28/2023 10.1  9.2 - 12.9 fL Final    AFP 04/28/2023 3.1  0.0 - 8.4 ng/mL Final    Hepatitis B Surface Ag 04/28/2023 Reactive (A)  Non-reactive Final    Hep B Viral DNA IU/ML 04/28/2023 <10  <10 IU/mL Final    Log HBV IU/mL 04/28/2023 <1.00  <1.00 Log (10) IU/mL Final    Hepatitis B Virus DNA 04/28/2023 DETECTED (A)  Not detected Final    Hepatitis C Ab 04/28/2023 Non-reactive  Non-reactive Final    Delta Ab Total Quantative 04/28/2023 Negative  Negative Final    Sodium 04/28/2023 139  136 - 145 mmol/L Final    Potassium 04/28/2023 4.3  3.5 - 5.1 mmol/L Final    Chloride 04/28/2023 102  95 - 110 mmol/L Final    CO2 04/28/2023 27  23 - 29 mmol/L Final    Glucose 04/28/2023 94  70 - 110 mg/dL Final    BUN 04/28/2023 31 (H)  6 - 20 mg/dL Final    Creatinine 04/28/2023 1.3  0.5 - 1.4 mg/dL Final    Calcium 04/28/2023 9.4  8.7 - 10.5 mg/dL Final    Total Protein 04/28/2023 7.8  6.0 - 8.4 g/dL Final    Albumin 04/28/2023 3.8  3.5 - 5.2 g/dL Final    Total Bilirubin 04/28/2023 1.0  0.1 - 1.0 mg/dL Final    Alkaline Phosphatase 04/28/2023 126  55 - 135 U/L Final    AST 04/28/2023  60 (H)  10 - 40 U/L Final    ALT 04/28/2023 40  10 - 44 U/L Final    Anion Gap 04/28/2023 10  8 - 16 mmol/L Final    eGFR 04/28/2023 50.4 (A)  >60 mL/min/1.73 m^2 Final    HBsAg Confirmation 04/28/2023 Confirmed Positive (A)  Not confirmed Final   Lab Visit on 03/31/2023   Component Date Value Ref Range Status    WBC 03/31/2023 2.77 (L)  3.90 - 12.70 K/uL Final    RBC 03/31/2023 3.63 (L)  4.00 - 5.40 M/uL Final    Hemoglobin 03/31/2023 10.5 (L)  12.0 - 16.0 g/dL Final    Hematocrit 03/31/2023 31.8 (L)  37.0 - 48.5 % Final    MCV 03/31/2023 88  82 - 98 fL Final    MCH 03/31/2023 28.9  27.0 - 31.0 pg Final    MCHC 03/31/2023 33.0  32.0 - 36.0 g/dL Final    RDW 03/31/2023 15.6 (H)  11.5 - 14.5 % Final    Platelets 03/31/2023 77 (L)  150 - 450 K/uL Final    MPV 03/31/2023 10.5  9.2 - 12.9 fL Final    Immature Granulocytes 03/31/2023 0.0  0.0 - 0.5 % Final    Gran # (ANC) 03/31/2023 1.5 (L)  1.8 - 7.7 K/uL Final    Immature Grans (Abs) 03/31/2023 0.00  0.00 - 0.04 K/uL Final    Lymph # 03/31/2023 0.8 (L)  1.0 - 4.8 K/uL Final    Mono # 03/31/2023 0.3  0.3 - 1.0 K/uL Final    Eos # 03/31/2023 0.1  0.0 - 0.5 K/uL Final    Baso # 03/31/2023 0.03  0.00 - 0.20 K/uL Final    nRBC 03/31/2023 0  0 /100 WBC Final    Gran % 03/31/2023 54.9  38.0 - 73.0 % Final    Lymph % 03/31/2023 27.4  18.0 - 48.0 % Final    Mono % 03/31/2023 11.9  4.0 - 15.0 % Final    Eosinophil % 03/31/2023 4.7  0.0 - 8.0 % Final    Basophil % 03/31/2023 1.1  0.0 - 1.9 % Final    Differential Method 03/31/2023 Automated   Final    Sodium 03/31/2023 133 (L)  136 - 145 mmol/L Final    Potassium 03/31/2023 4.5  3.5 - 5.1 mmol/L Final    Chloride 03/31/2023 96  95 - 110 mmol/L Final    CO2 03/31/2023 24  23 - 29 mmol/L Final    Glucose 03/31/2023 130 (H)  70 - 110 mg/dL Final    BUN 03/31/2023 45 (H)  6 - 20 mg/dL Final    Creatinine 03/31/2023 1.7 (H)  0.5 - 1.4 mg/dL Final    Calcium 03/31/2023 9.4  8.7 - 10.5 mg/dL Final    Total Protein 03/31/2023 7.5  6.0 -  8.4 g/dL Final    Albumin 03/31/2023 3.4 (L)  3.5 - 5.2 g/dL Final    Total Bilirubin 03/31/2023 0.7  0.1 - 1.0 mg/dL Final    Alkaline Phosphatase 03/31/2023 118  55 - 135 U/L Final    AST 03/31/2023 118 (H)  10 - 40 U/L Final    ALT 03/31/2023 122 (H)  10 - 44 U/L Final    Anion Gap 03/31/2023 13  8 - 16 mmol/L Final    eGFR 03/31/2023 36.5 (A)  >60 mL/min/1.73 m^2 Final   Lab Visit on 11/23/2022   Component Date Value Ref Range Status    Sodium 11/23/2022 134 (L)  136 - 145 mmol/L Final    Potassium 11/23/2022 4.4  3.5 - 5.1 mmol/L Final    Chloride 11/23/2022 106  95 - 110 mmol/L Final    CO2 11/23/2022 22 (L)  23 - 29 mmol/L Final    Glucose 11/23/2022 197 (H)  70 - 110 mg/dL Final    BUN 11/23/2022 15  6 - 20 mg/dL Final    Creatinine 11/23/2022 1.1  0.5 - 1.4 mg/dL Final    Calcium 11/23/2022 8.9  8.7 - 10.5 mg/dL Final    Total Protein 11/23/2022 6.9  6.0 - 8.4 g/dL Final    Albumin 11/23/2022 2.9 (L)  3.5 - 5.2 g/dL Final    Total Bilirubin 11/23/2022 0.4  0.1 - 1.0 mg/dL Final    Alkaline Phosphatase 11/23/2022 147 (H)  55 - 135 U/L Final    AST 11/23/2022 44 (H)  10 - 40 U/L Final    ALT 11/23/2022 61 (H)  10 - 44 U/L Final    Anion Gap 11/23/2022 6 (L)  8 - 16 mmol/L Final    eGFR 11/23/2022 >60.0  >60 mL/min/1.73 m^2 Final    WBC 11/23/2022 1.52 (LL)  3.90 - 12.70 K/uL Final    RBC 11/23/2022 2.88 (L)  4.00 - 5.40 M/uL Final    Hemoglobin 11/23/2022 8.6 (L)  12.0 - 16.0 g/dL Final    Hematocrit 11/23/2022 27.3 (L)  37.0 - 48.5 % Final    MCV 11/23/2022 95  82 - 98 fL Final    MCH 11/23/2022 29.9  27.0 - 31.0 pg Final    MCHC 11/23/2022 31.5 (L)  32.0 - 36.0 g/dL Final    RDW 11/23/2022 14.9 (H)  11.5 - 14.5 % Final    Platelets 11/23/2022 53 (L)  150 - 450 K/uL Final    MPV 11/23/2022 12.0  9.2 - 12.9 fL Final    Prothrombin Time 11/23/2022 11.4  9.0 - 12.5 sec Final    INR 11/23/2022 1.1  0.8 - 1.2 Final    Hepatitis B Surface Ag 11/23/2022 Reactive (A)  Non-reactive Final    Hep B Viral DNA IU/ML  11/23/2022 87,171 (H)  <10 IU/mL Final    Log HBV IU/mL 11/23/2022 4.94 (H)  <1.00 Log (10) IU/mL Final    Hepatitis B Virus DNA 11/23/2022 DETECTED (A)  Not detected Final    Vitamin B-12 11/23/2022 1668 (H)  210 - 950 pg/mL Final    HBsAg Confirmation 11/23/2022 Confirmed Positive (A)  Not confirmed Final       Past Medical History:   Diagnosis Date    Bone spur     hip    HTN (hypertension)      Social History     Socioeconomic History    Marital status:    Tobacco Use    Smoking status: Every Day     Packs/day: 1.00     Types: Cigarettes     Passive exposure: Current    Smokeless tobacco: Never   Substance and Sexual Activity    Alcohol use: Not Currently    Drug use: Not Currently     Past Surgical History:   Procedure Laterality Date    CHOLECYSTECTOMY      HYSTERECTOMY       History reviewed. No pertinent family history.    Review of patient's allergies indicates:   Allergen Reactions    Pcn [penicillins] Hives    Morphine Other (See Comments)     heartburn    Bupropion hcl Other (See Comments)    Demerol [meperidine]        Current Outpatient Medications:     bisoprolol (ZEBETA) 5 MG tablet, Take 1 tablet (5 mg total) by mouth once daily., Disp: 30 tablet, Rfl: 2    cyanocobalamin 1,000 mcg/mL injection, Inject 1 mL (1,000 mcg total) into the muscle every 28 days., Disp: 30 mL, Rfl: 0    diazePAM (VALIUM) 10 MG Tab, Take 1 tablet (10 mg total) by mouth 3 (three) times daily., Disp: 90 tablet, Rfl: 1    diclofenac sodium (VOLTAREN) 1 % Gel, Apply 2 g topically 4 (four) times daily., Disp: 100 g, Rfl: 0    docusate calcium (SURFAK) 240 mg capsule, Take 240 mg by mouth once daily., Disp: , Rfl:     EScitalopram oxalate (LEXAPRO) 10 MG tablet, Take 1 tablet (10 mg total) by mouth once daily., Disp: 30 tablet, Rfl: 2    furosemide (LASIX) 20 MG tablet, Take 1 tablet (20 mg total) by mouth once daily., Disp: 30 tablet, Rfl: 2    gabapentin (NEURONTIN) 300 MG capsule, Take 1 capsule (300 mg total) by mouth  2 (two) times daily., Disp: 60 capsule, Rfl: 2    lisinopriL 10 MG tablet, Take 1 tablet (10 mg total) by mouth once daily., Disp: 90 tablet, Rfl: 1    naloxone (NARCAN) 4 mg/actuation Spry, each nostril if not breathing or not responsive, Disp: 1 each, Rfl: 2    ondansetron (ZOFRAN-ODT) 4 MG TbDL, Take 1 tablet (4 mg total) by mouth every 8 (eight) hours as needed (nausea)., Disp: 30 tablet, Rfl: 0    oxyCODONE (ROXICODONE) 15 MG Tab, Take 1 tablet (15 mg total) by mouth every 6 (six) hours as needed for Pain., Disp: 120 tablet, Rfl: 0    risperiDONE (RISPERDAL) 0.5 MG Tab, Take 1 tablet (0.5 mg total) by mouth once daily. along with 1 mg daily, Disp: 30 tablet, Rfl: 1    risperiDONE (RISPERDAL) 1 MG tablet, Take 1 tablet (1 mg total) by mouth 2 (two) times daily., Disp: 60 tablet, Rfl: 2    senna (SENOKOT) 8.6 mg tablet, Take 1 tablet by mouth daily as needed for Constipation., Disp: 30 tablet, Rfl: 2    spironolactone (ALDACTONE) 100 MG tablet, Take 1 tablet (100 mg total) by mouth once daily., Disp: 30 tablet, Rfl: 5    tenofovir alafenamide (VEMLIDY) 25 mg Tab, Take 1 tablet (25 mg total) by mouth once daily., Disp: 30 tablet, Rfl: 11    Review of Systems   Constitutional:  Negative for appetite change, fatigue, fever and unexpected weight change.   Respiratory:  Negative for cough, chest tightness, shortness of breath and wheezing.    Cardiovascular:  Negative for chest pain and leg swelling.   Gastrointestinal:  Positive for abdominal pain. Negative for constipation, nausea and vomiting.        -heartburn, bloating   Genitourinary:  Negative for difficulty urinating, dysuria, frequency and urgency.   Musculoskeletal:  Positive for arthralgias and back pain. Negative for myalgias and neck pain.   Skin:  Negative for rash.   Neurological:  Negative for dizziness, weakness, numbness and headaches.   Hematological:  Does not bruise/bleed easily.   Psychiatric/Behavioral:  Positive for sleep disturbance. Negative  "for dysphoric mood and suicidal ideas. The patient is nervous/anxious.    All other systems reviewed and are negative.       Objective:      Vitals:    05/15/23 1147   BP: (!) 94/52   Pulse: 64   SpO2: 99%   Weight: 88.9 kg (196 lb)   Height: 5' 11" (1.803 m)         Wt Readings from Last 6 Encounters:   05/15/23 88.9 kg (196 lb)   04/28/23 92.4 kg (203 lb 11.3 oz)   03/31/23 93.7 kg (206 lb 9.1 oz)   02/13/23 92.2 kg (203 lb 3.2 oz)   11/29/22 91.1 kg (200 lb 13.4 oz)   11/23/22 92.8 kg (204 lb 9.4 oz)         Physical Exam  Vitals reviewed.   Constitutional:       General: She is not in acute distress.     Appearance: Normal appearance. She is well-developed.      Comments: overweight   HENT:      Head: Normocephalic and atraumatic.   Neck:      Thyroid: No thyromegaly.   Cardiovascular:      Rate and Rhythm: Normal rate and regular rhythm.      Heart sounds: No murmur heard.    No friction rub.      Comments: Bilateral lower extremity varicose veins  Pulmonary:      Effort: Pulmonary effort is normal.      Breath sounds: Normal breath sounds. No wheezing or rales.   Abdominal:      General: There is no distension.      Palpations: Abdomen is soft.      Tenderness: There is no abdominal tenderness. There is no guarding or rebound.   Musculoskeletal:      Cervical back: Neck supple.      Thoracic back: Bony tenderness present. Normal range of motion.        Back:       Right lower leg: Edema (trace) present.      Left lower leg: Edema (trace) present.      Comments: TTP mid thoracic spine above the braline   Lymphadenopathy:      Cervical: No cervical adenopathy.   Skin:     General: Skin is warm and dry.      Findings: No rash.   Neurological:      Mental Status: She is alert and oriented to person, place, and time.   Psychiatric:         Attention and Perception: Attention normal. She does not perceive auditory hallucinations.         Mood and Affect: Mood is depressed.         Speech: Speech normal.         " Behavior: Behavior normal.         Thought Content: Thought content normal.         Cognition and Memory: Cognition is not impaired.         Judgment: Judgment normal.         Assessment:       1. Essential hypertension    2. Chronic hepatitis B    3. Bipolar affective disorder, currently depressed, moderate    4. Chronic low back pain, unspecified back pain laterality, unspecified whether sciatica present    5. Melena    6. Gingivitis due to dental plaque    7. Acute midline thoracic back pain             Plan:       Essential hypertension  Comments:  Controlled. Will continue to monitor to BP on current medication regimen    Chronic hepatitis B  Comments:  Chronic. To continue to follow with GI.    Bipolar affective disorder, currently depressed, moderate  Comments:  Symptomatic. Will increase lexapro. To continue to follow with pscyhe.    Chronic low back pain, unspecified back pain laterality, unspecified whether sciatica present  Comments:  Chronic. Will get Xray to start eval of low back pain    Melena  Comments:  Possible. To follow with GI.     Gingivitis due to dental plaque  Comments:  Acute. Will tx w/ clinda due to pcn allergy    Acute midline thoracic back pain        Labs and/or tests have been ordered for the evaluation/monitoring of acute/chronic conditions, to be done just before next visit.    Chronic Pain Notes:  Have discussed the risks and benefits of chronic narcotic therapy including pain control, dependence, and addiction.  The patient is aware and accepts the risks and benefits. Patient is aware of the risk of taking narcotics combined with benzodiazepines/muscle relaxers/hypnotics, including but not limited to breathing difficulties, coma, and death; accepts the risks; and agrees to use medications only as prescribed.    Follow up in about 3 months (around 8/15/2023) for HTN, Arthritis.        5/15/2023 Marleen Sun

## 2023-05-16 ENCOUNTER — TELEPHONE (OUTPATIENT)
Dept: HEPATOLOGY | Facility: CLINIC | Age: 48
End: 2023-05-16
Payer: MEDICAID

## 2023-05-16 NOTE — TELEPHONE ENCOUNTER
Returned the call, asking about a scope-she saw a dr. Dr Mitch Acuna- [facility]  For this scope who is saying she needs a note stating she is cleared to have a lower scope. The dr wants clearance to say she is ok under anesthesia for the lower scope  The patient  states her bowel movements are black and smells of death.  She also says she has been trying to get this clearance for months and does she have to see you   ---- Message from Alex Urban sent at 5/16/2023 10:26 AM CDT -----  Regarding: Speak to Nurse  Patient called in requesting to speak with provider's nurse for an update on some information relayed to her by provider. No other info provided. Requested a call back to discuss further.                         Contact: [phone]

## 2023-05-18 ENCOUNTER — TELEPHONE (OUTPATIENT)
Dept: HEPATOLOGY | Facility: CLINIC | Age: 48
End: 2023-05-18
Payer: MEDICAID

## 2023-05-18 NOTE — TELEPHONE ENCOUNTER
Took another call from this patient who is in dire need of a liver clearance for Gastro to do a procedure. Dr. Acuna will not schedule her until Dr. Mason clears tthe pt from a liver stand point.    I assured I would give the message again to Dr. Mason

## 2023-05-18 NOTE — TELEPHONE ENCOUNTER
----- Message from Martell López sent at 5/18/2023 10:16 AM CDT -----  Regarding: call back  Patient called in requesting to speak with provider's nurse for an update on some information relayed to her by provider She also says she has been trying to get this clearance for months requesting call back    Call

## 2023-05-18 NOTE — TELEPHONE ENCOUNTER
Pt stated she's been trying to get this clearance was last seen in April 2023      Also stated she was told this will be forward to the supervisor

## 2023-05-19 ENCOUNTER — TELEPHONE (OUTPATIENT)
Dept: FAMILY MEDICINE | Facility: CLINIC | Age: 48
End: 2023-05-19

## 2023-05-19 ENCOUNTER — TELEPHONE (OUTPATIENT)
Dept: HEPATOLOGY | Facility: CLINIC | Age: 48
End: 2023-05-19
Payer: MEDICAID

## 2023-05-19 NOTE — TELEPHONE ENCOUNTER
Faxed clearance for colonoscopy to Dr. Acuna at the [facility] in [facility], [place] [phone] and fax is [phone]    Call to patient to inform her that it has been sent and the same letter is on her chart. She said she would call to see if they received it

## 2023-05-19 NOTE — TELEPHONE ENCOUNTER
----- Message from Faby Del Cid sent at 5/19/2023  9:56 AM CDT -----  Pt would like a cream or something called in for severe muscle spasms, toes are curling up   [facility]   [phone]

## 2023-05-22 ENCOUNTER — TELEPHONE (OUTPATIENT)
Dept: HEPATOLOGY | Facility: CLINIC | Age: 48
End: 2023-05-22
Payer: MEDICAID

## 2023-05-22 NOTE — TELEPHONE ENCOUNTER
----- Message from Martell López sent at 5/22/2023 12:48 PM CDT -----  Regarding: call back  Pt call to speak with someone in regards to some question she has about her office visit requesting call back    Call

## 2023-05-23 ENCOUNTER — HOSPITAL ENCOUNTER (OUTPATIENT)
Dept: RADIOLOGY | Facility: HOSPITAL | Age: 48
Discharge: HOME | End: 2023-05-23
Attending: FAMILY MEDICINE
Payer: MEDICAID

## 2023-05-23 DIAGNOSIS — G89.29 CHRONIC LOW BACK PAIN, UNSPECIFIED BACK PAIN LATERALITY, UNSPECIFIED WHETHER SCIATICA PRESENT: ICD-10-CM

## 2023-05-23 DIAGNOSIS — M54.6 ACUTE MIDLINE THORACIC BACK PAIN: ICD-10-CM

## 2023-05-23 DIAGNOSIS — M54.50 CHRONIC LOW BACK PAIN, UNSPECIFIED BACK PAIN LATERALITY, UNSPECIFIED WHETHER SCIATICA PRESENT: ICD-10-CM

## 2023-05-23 PROCEDURE — 72070 X-RAY EXAM THORAC SPINE 2VWS: CPT | Mod: TC,FY,PO

## 2023-05-23 PROCEDURE — 72100 X-RAY EXAM L-S SPINE 2/3 VWS: CPT | Mod: TC,FY,PO

## 2023-05-23 PROCEDURE — 72070 X-RAY EXAM THORAC SPINE 2VWS: CPT | Mod: 26,,, | Performed by: RADIOLOGY

## 2023-05-23 PROCEDURE — 72100 XR LUMBAR SPINE AP AND LATERAL: ICD-10-PCS | Mod: 26,,, | Performed by: RADIOLOGY

## 2023-05-23 PROCEDURE — 72070 XR THORACIC SPINE AP LATERAL: ICD-10-PCS | Mod: 26,,, | Performed by: RADIOLOGY

## 2023-05-23 PROCEDURE — 72100 X-RAY EXAM L-S SPINE 2/3 VWS: CPT | Mod: 26,,, | Performed by: RADIOLOGY

## 2023-05-24 DIAGNOSIS — I10 ESSENTIAL HYPERTENSION: ICD-10-CM

## 2023-05-24 DIAGNOSIS — F11.20 UNCOMPLICATED OPIOID DEPENDENCE: ICD-10-CM

## 2023-05-24 RX ORDER — NALOXONE HYDROCHLORIDE 4 MG/.1ML
SPRAY NASAL
Qty: 1 EACH | Refills: 2 | Status: SHIPPED | OUTPATIENT
Start: 2023-05-24

## 2023-05-24 RX ORDER — BISOPROLOL FUMARATE 5 MG/1
5 TABLET, FILM COATED ORAL DAILY
Qty: 30 TABLET | Refills: 2 | Status: SHIPPED | OUTPATIENT
Start: 2023-05-24 | End: 2023-08-21 | Stop reason: SDUPTHER

## 2023-05-24 NOTE — TELEPHONE ENCOUNTER
----- Message from Maren Mccrary sent at 5/24/2023  9:49 AM CDT -----  Patient called and stated that she need a refill of her naloxone,bisoprolol, and her oxycodone  she stated that she gave her neighbor what she had left to keep her alive (patient exact statement ) please call into [facility] in [facility] if any questions please give her a call at [phone]

## 2023-05-24 NOTE — TELEPHONE ENCOUNTER
The patient's prescription has been approved and sent to   [facility]/pharmacy #[id number] - [place], [place] - [address] [place] [id number]  Phone: [phone] Fax: [phone]

## 2023-05-31 DIAGNOSIS — G89.4 CHRONIC PAIN SYNDROME: ICD-10-CM

## 2023-05-31 RX ORDER — OXYCODONE HYDROCHLORIDE 15 MG/1
15 TABLET ORAL EVERY 6 HOURS PRN
Qty: 120 TABLET | Refills: 0 | Status: SHIPPED | OUTPATIENT
Start: 2023-06-02 | End: 2023-06-29 | Stop reason: SDUPTHER

## 2023-05-31 NOTE — TELEPHONE ENCOUNTER
----- Message from Maren Mccrary sent at 5/31/2023  8:50 AM CDT -----  Patient called and stated that she need a refill of her oxycodone called into [facility] in [facility] if any questions please give her a call at [phone]

## 2023-06-01 NOTE — TELEPHONE ENCOUNTER
The patient's prescription has been approved and sent to   [facility]/pharmacy #[id number] - [place], [place] - [address] [place] [id number]  Phone: [phone] Fax: [phone]

## 2023-06-05 ENCOUNTER — TELEPHONE (OUTPATIENT)
Dept: FAMILY MEDICINE | Facility: CLINIC | Age: 48
End: 2023-06-05

## 2023-06-05 NOTE — TELEPHONE ENCOUNTER
Pt mother,Yazmin, states pt is supposed to have a colonoscopy today and she was taking her pain medication yesterday. Yazmin states pt took 2 pain pills. Pt was falling over and falling asleep on the table. Pt mother states she is unable to take care of pt. States pt needs help and someone to be with her at all times. Pt mother wants to know what she can do to find pt a facility that will take care of her.

## 2023-06-05 NOTE — TELEPHONE ENCOUNTER
----- Message from Maren Mccrary sent at 6/5/2023  8:03 AM CDT -----  Patient mom Yazmin called and stated that she need to speak to the nurse ASAP please give her a call at [phone]

## 2023-06-14 ENCOUNTER — TELEPHONE (OUTPATIENT)
Dept: HEPATOLOGY | Facility: CLINIC | Age: 48
End: 2023-06-14
Payer: MEDICAID

## 2023-06-14 ENCOUNTER — SPECIALTY PHARMACY (OUTPATIENT)
Dept: PHARMACY | Facility: CLINIC | Age: 48
End: 2023-06-14
Payer: MEDICAID

## 2023-06-14 NOTE — TELEPHONE ENCOUNTER
----- Message from Janae Robison MA sent at 6/14/2023 12:28 PM CDT -----  Regarding: FW: Patient advice  Contact: Pt [phone]    ----- Message -----  From: Radha Zarate  Sent: 6/14/2023  11:26 AM CDT  To: Marlon Nolen Staff  Subject: Patient advice                                           Name of Caller: Marie Damon       Contact Preference:  [phone]         Nature of Call: Patient called to give update that colonoscopy has not be done yet had to cancel due to transportation would like to discuss other options

## 2023-06-14 NOTE — TELEPHONE ENCOUNTER
Call returned to the Patient at [phone].    Patient stated Dr Mason had wanted me to get a colonoscopy done with my GI Dr here.  I had it scheduled, took the prep. Then my sister and I got into a fuss and she didn't want to bring me.  I don't have any transportation to have it done.    You can call your local GI Dr and let her know and get the test rescheduled.    My sister does not want to bring me. Plus she said she is afraid to drive over water.  My mother is in the wheel chair and she can't get in and out of the car.    Tell Dr Mason to schedule for me to come and have it done there. I can just stay overnight  and have the van pick me up the next day.  I will arrange for the Medicare van to bring me.    The colonoscopy is not an overnight procedure.   You will not be able to have it done here it.     You have to have a Family member be responsible for you.   You cannot use a Ride Share Transportation, ie, Uber, Lyft, Third Party Van's.    Well maybe I will call her - Local GI Dr and let her know.   I think that would be best.  Patient voiced understanding.

## 2023-06-14 NOTE — TELEPHONE ENCOUNTER
Specialty Pharmacy - Refill Coordination    Specialty Medication Orders Linked to Encounter      Flowsheet Row Most Recent Value   Medication #1 tenofovir alafenamide (VEMLIDY) 25 mg Tab (Order#[id number], Rx#[id number])            Refill Questions - Documented Responses      Flowsheet Row Most Recent Value   Patient Availability and HIPAA Verification    Does patient want to proceed with activity? Yes   HIPAA/medical authority confirmed? Yes   Relationship to patient of person spoken to? Self   Refill Screening Questions    Changes to allergies? No   Changes to medications? No   New conditions since last clinic visit? No   Unplanned office visit, urgent care, ED, or hospital admission in the last 4 weeks? No   How does patient/caregiver feel medication is working? Good   Financial problems or insurance changes? No   How many doses of your specialty medications were missed in the last 4 weeks? 0   Would patient like to speak to a pharmacist? No   When does the patient need to receive the medication? 06/23/23   Refill Delivery Questions    How will the patient receive the medication? MEDRx   When does the patient need to receive the medication? 06/23/23   Shipping Address Home   Address in [facility] confirmed and updated if neccessary? Yes   Expected Copay ($) 0   Is the patient able to afford the medication copay? Yes   Payment Method zero copay   Days supply of Refill 30   Supplies needed? No supplies needed   Refill activity completed? Yes   Refill activity plan Refill scheduled   Shipment/Pickup Date: 06/20/23            Current Outpatient Medications   Medication Sig    bisoprolol (ZEBETA) 5 MG tablet Take 1 tablet (5 mg total) by mouth once daily.    clindamycin (CLEOCIN) 300 MG capsule Take 1 capsule (300 mg total) by mouth every 8 (eight) hours.    cyanocobalamin 1,000 mcg/mL injection Inject 1 mL (1,000 mcg total) into the muscle every 28 days.    diazePAM (VALIUM) 10 MG Tab Take 1 tablet (10 mg  total) by mouth 3 (three) times daily.    diclofenac sodium (VOLTAREN) 1 % Gel Apply 2 g topically 4 (four) times daily.    docusate calcium (SURFAK) 240 mg capsule Take 240 mg by mouth once daily.    EScitalopram oxalate (LEXAPRO) 20 MG tablet Take 1 tablet (20 mg total) by mouth once daily.    furosemide (LASIX) 20 MG tablet Take 1 tablet (20 mg total) by mouth once daily.    gabapentin (NEURONTIN) 300 MG capsule Take 1 capsule (300 mg total) by mouth 2 (two) times daily.    lisinopriL 10 MG tablet Take 1 tablet (10 mg total) by mouth once daily.    naloxone (NARCAN) 4 mg/actuation Spry each nostril if not breathing or not responsive    ondansetron (ZOFRAN-ODT) 4 MG TbDL Take 1 tablet (4 mg total) by mouth every 8 (eight) hours as needed (nausea).    oxyCODONE (ROXICODONE) 15 MG Tab Take 1 tablet (15 mg total) by mouth every 6 (six) hours as needed for Pain.    risperiDONE (RISPERDAL) 0.5 MG Tab Take 1 tablet (0.5 mg total) by mouth once daily. along with 1 mg daily    risperiDONE (RISPERDAL) 1 MG tablet Take 1 tablet (1 mg total) by mouth 2 (two) times daily.    senna (SENOKOT) 8.6 mg tablet Take 1 tablet by mouth daily as needed for Constipation.    spironolactone (ALDACTONE) 100 MG tablet Take 1 tablet (100 mg total) by mouth once daily.    tenofovir alafenamide (VEMLIDY) 25 mg Tab Take 1 tablet (25 mg total) by mouth once daily.   Last reviewed on 5/15/2023 11:53 AM by Marleen Sun MD    Review of patient's allergies indicates:   Allergen Reactions    Pcn [penicillins] Hives    Morphine Other (See Comments)     heartburn    Bupropion hcl Other (See Comments)    Demerol [meperidine]     Last reviewed on  5/31/2023 8:55 AM by Tia Hernandez      Tasks added this encounter   No tasks added.   Tasks due within next 3 months   6/16/2023 - Refill Coordination Outreach (1 time occurrence)     Radha Strong, PharmD  [facility]  [address] [place] [address]  Phone:  [phone]  Fax: [phone]

## 2023-06-19 DIAGNOSIS — F41.9 ANXIETY: ICD-10-CM

## 2023-06-19 DIAGNOSIS — G89.4 CHRONIC PAIN SYNDROME: ICD-10-CM

## 2023-06-19 RX ORDER — DIAZEPAM 10 MG/1
10 TABLET ORAL 3 TIMES DAILY
Qty: 90 TABLET | Refills: 1 | Status: SHIPPED | OUTPATIENT
Start: 2023-06-19 | End: 2023-08-21 | Stop reason: SDUPTHER

## 2023-06-19 RX ORDER — GABAPENTIN 300 MG/1
300 CAPSULE ORAL 2 TIMES DAILY
Qty: 60 CAPSULE | Refills: 2 | Status: SHIPPED | OUTPATIENT
Start: 2023-06-19 | End: 2023-08-21 | Stop reason: SDUPTHER

## 2023-06-19 NOTE — TELEPHONE ENCOUNTER
----- Message from Chyna Gordon MA sent at 6/19/2023  3:06 PM CDT -----  Regarding: refill  Pt needs gabapentin and diazepam sent to [facility] in [place]. [phone]

## 2023-06-19 NOTE — TELEPHONE ENCOUNTER
The patient's prescription has been approved and sent to   [facility]/pharmacy #[id number] - [place], [place] - [address] [place] [address]  Phone: [phone] Fax: [phone]

## 2023-06-28 ENCOUNTER — TELEPHONE (OUTPATIENT)
Dept: HEMATOLOGY/ONCOLOGY | Facility: CLINIC | Age: 48
End: 2023-06-28
Payer: MEDICAID

## 2023-06-28 NOTE — TELEPHONE ENCOUNTER
----- Message from Riya Woodruff sent at 6/28/2023  2:49 PM CDT -----  Contact: Pt  Type:  Needs Medical Advice    Who Called: Pt    Would the patient rather a call back or a response via [facility]? call  Best Call Back Number: [phone]  Additional Information: Pt is applying for social security, and would like to know the date of her diagnosis for hepatitis. Pt would also like to know why her appt on 09/25/2023 was cancelled. Please call pt back to advise.     Returned patients calls, unable to leave voicemail. Hepatitis dx was 6/20/22. Follow up appointment was cancelled from someone outside of our department and can be rescheduled with NP.

## 2023-06-29 DIAGNOSIS — G89.4 CHRONIC PAIN SYNDROME: ICD-10-CM

## 2023-06-29 RX ORDER — OXYCODONE HYDROCHLORIDE 15 MG/1
15 TABLET ORAL EVERY 6 HOURS PRN
Qty: 120 TABLET | Refills: 0 | Status: SHIPPED | OUTPATIENT
Start: 2023-07-02 | End: 2023-07-28 | Stop reason: SDUPTHER

## 2023-06-29 NOTE — TELEPHONE ENCOUNTER
----- Message from Maren Mccrary sent at 6/29/2023 12:53 PM CDT -----  Patient called and stated she need a refill of her oxycodone  called into [facility] in [facility] . She stated that she is applying for SSI and they are sending a request of medical records  and she want to make sure that all her if any questions please give her a call at [phone]

## 2023-06-29 NOTE — TELEPHONE ENCOUNTER
The patient's prescription has been approved and sent to   [facility]/pharmacy #[id number] - [place], [place] - [address] [place] [address]  Phone: [phone] Fax: [phone]

## 2023-07-05 ENCOUNTER — TELEPHONE (OUTPATIENT)
Dept: FAMILY MEDICINE | Facility: CLINIC | Age: 48
End: 2023-07-05

## 2023-07-05 NOTE — TELEPHONE ENCOUNTER
----- Message from Maren Mccrary sent at 7/5/2023 10:04 AM CDT -----  Patient called and stated that she is at the pharmacy now and she does not have any way to get her medicine and they need a prior auth and she need it done ASAP please give her a call at [phone] (this is [facility] phone number)

## 2023-07-12 ENCOUNTER — SPECIALTY PHARMACY (OUTPATIENT)
Dept: PHARMACY | Facility: CLINIC | Age: 48
End: 2023-07-12
Payer: MEDICAID

## 2023-07-12 NOTE — TELEPHONE ENCOUNTER
Specialty Pharmacy - Refill Coordination    Specialty Medication Orders Linked to Encounter      Flowsheet Row Most Recent Value   Medication #1 tenofovir alafenamide (VEMLIDY) 25 mg Tab (Order#[id number], Rx#[id number])            Refill Questions - Documented Responses      Flowsheet Row Most Recent Value   Patient Availability and HIPAA Verification    Does patient want to proceed with activity? Yes   HIPAA/medical authority confirmed? Yes   Relationship to patient of person spoken to? Self   Refill Screening Questions    Changes to allergies? No   Changes to medications? No   New conditions since last clinic visit? No   Unplanned office visit, urgent care, ED, or hospital admission in the last 4 weeks? No   How does patient/caregiver feel medication is working? Good   Financial problems or insurance changes? No   How many doses of your specialty medications were missed in the last 4 weeks? 0   When does the patient need to receive the medication? 07/17/23   Refill Delivery Questions    How will the patient receive the medication? MEDRx   When does the patient need to receive the medication? 07/17/23   Shipping Address Home   Address in [facility] confirmed and updated if neccessary? Yes   Expected Copay ($) 0   Is the patient able to afford the medication copay? Yes   Payment Method zero copay   Days supply of Refill 30   Supplies needed? No supplies needed   Refill activity completed? Yes   Refill activity plan Refill scheduled   Shipment/Pickup Date: 07/13/23            Current Outpatient Medications   Medication Sig    bisoprolol (ZEBETA) 5 MG tablet Take 1 tablet (5 mg total) by mouth once daily.    clindamycin (CLEOCIN) 300 MG capsule Take 1 capsule (300 mg total) by mouth every 8 (eight) hours.    cyanocobalamin 1,000 mcg/mL injection Inject 1 mL (1,000 mcg total) into the muscle every 28 days.    diazePAM (VALIUM) 10 MG Tab Take 1 tablet (10 mg total) by mouth 3 (three) times daily.    diclofenac  sodium (VOLTAREN) 1 % Gel Apply 2 g topically 4 (four) times daily.    docusate calcium (SURFAK) 240 mg capsule Take 240 mg by mouth once daily.    EScitalopram oxalate (LEXAPRO) 20 MG tablet Take 1 tablet (20 mg total) by mouth once daily.    furosemide (LASIX) 20 MG tablet Take 1 tablet (20 mg total) by mouth once daily.    gabapentin (NEURONTIN) 300 MG capsule Take 1 capsule (300 mg total) by mouth 2 (two) times daily.    lisinopriL 10 MG tablet Take 1 tablet (10 mg total) by mouth once daily.    naloxone (NARCAN) 4 mg/actuation Spry each nostril if not breathing or not responsive    ondansetron (ZOFRAN-ODT) 4 MG TbDL Take 1 tablet (4 mg total) by mouth every 8 (eight) hours as needed (nausea).    oxyCODONE (ROXICODONE) 15 MG Tab Take 1 tablet (15 mg total) by mouth every 6 (six) hours as needed for Pain.    risperiDONE (RISPERDAL) 0.5 MG Tab Take 1 tablet (0.5 mg total) by mouth once daily. along with 1 mg daily    risperiDONE (RISPERDAL) 1 MG tablet Take 1 tablet (1 mg total) by mouth 2 (two) times daily.    senna (SENOKOT) 8.6 mg tablet Take 1 tablet by mouth daily as needed for Constipation.    spironolactone (ALDACTONE) 100 MG tablet Take 1 tablet (100 mg total) by mouth once daily.    tenofovir alafenamide (VEMLIDY) 25 mg Tab Take 1 tablet (25 mg total) by mouth once daily.   Last reviewed on 5/15/2023 11:53 AM by Marleen Sun MD    Review of patient's allergies indicates:   Allergen Reactions    Pcn [penicillins] Hives    Morphine Other (See Comments)     heartburn    Bupropion hcl Other (See Comments)    Demerol [meperidine]     Last reviewed on  6/29/2023 1:42 PM by Tia Hernandez      Tasks added this encounter   No tasks added.   Tasks due within next 3 months   No tasks due.     Tia Caal, PharmD  [facility]  [address] [place] [address]  Phone: [phone]  Fax: [phone]

## 2023-07-18 ENCOUNTER — TELEPHONE (OUTPATIENT)
Dept: FAMILY MEDICINE | Facility: CLINIC | Age: 48
End: 2023-07-18

## 2023-07-18 NOTE — TELEPHONE ENCOUNTER
----- Message from Cathy Mcghee MA sent at 7/18/2023 11:22 AM CDT -----  Regarding: needs auth for medication  Needs auth on diazepam  [place], [place]  [phone]

## 2023-07-20 ENCOUNTER — TELEPHONE (OUTPATIENT)
Dept: FAMILY MEDICINE | Facility: CLINIC | Age: 48
End: 2023-07-20

## 2023-07-28 DIAGNOSIS — G89.4 CHRONIC PAIN SYNDROME: ICD-10-CM

## 2023-07-28 RX ORDER — OXYCODONE HYDROCHLORIDE 15 MG/1
15 TABLET ORAL EVERY 6 HOURS PRN
Qty: 120 TABLET | Refills: 0 | Status: SHIPPED | OUTPATIENT
Start: 2023-08-03 | End: 2023-09-01 | Stop reason: SDUPTHER

## 2023-07-28 RX ORDER — AZITHROMYCIN 250 MG/1
TABLET, FILM COATED ORAL
Qty: 6 TABLET | Refills: 0 | Status: SHIPPED | OUTPATIENT
Start: 2023-07-28 | End: 2023-08-02

## 2023-07-28 NOTE — TELEPHONE ENCOUNTER
----- Message from Maren Mccrary sent at 7/28/2023 11:31 AM CDT -----  Patient called and stated that she need a refill of her oxycodone and she need something called in because she has a bad cold. Please call into [facility]. If any questions please give her a call at [phone]

## 2023-07-31 ENCOUNTER — TELEPHONE (OUTPATIENT)
Dept: FAMILY MEDICINE | Facility: CLINIC | Age: 48
End: 2023-07-31

## 2023-07-31 NOTE — TELEPHONE ENCOUNTER
----- Message from Faby Del Cid sent at 7/31/2023 12:54 PM CDT -----  Pt is calling to talk to nurse about her medication. She would like to talk to someone asap   [phone]

## 2023-07-31 NOTE — TELEPHONE ENCOUNTER
----- Message from Trista Crespo sent at 7/31/2023  2:14 PM CDT -----  [facility]. Pt #[phone]

## 2023-07-31 NOTE — TELEPHONE ENCOUNTER
----- Message from Faby Del Cid sent at 7/31/2023  9:51 AM CDT -----  Pt is calling for refill on her pain medication. The pharmacy is saying she can not get it till the 3rd.   [facility]   [phone]

## 2023-07-31 NOTE — TELEPHONE ENCOUNTER
Spoke to patient. Advised Rx is dated for 8/3/23 because that is when she is due. Dr Sun does not fill controlled meds early.

## 2023-08-15 ENCOUNTER — SPECIALTY PHARMACY (OUTPATIENT)
Dept: PHARMACY | Facility: CLINIC | Age: 48
End: 2023-08-15
Payer: MEDICAID

## 2023-08-15 NOTE — TELEPHONE ENCOUNTER
Specialty Pharmacy - Refill Coordination    Specialty Medication Orders Linked to Encounter      Flowsheet Row Most Recent Value   Medication #1 tenofovir alafenamide (VEMLIDY) 25 mg Tab (Order#[id number], Rx#[id number])            Refill Questions - Documented Responses      Flowsheet Row Most Recent Value   Patient Availability and HIPAA Verification    Does patient want to proceed with activity? Yes   HIPAA/medical authority confirmed? Yes   Relationship to patient of person spoken to? Self   Refill Screening Questions    Changes to allergies? No   Changes to medications? No   New conditions since last clinic visit? No   Unplanned office visit, urgent care, ED, or hospital admission in the last 4 weeks? No   How does patient/caregiver feel medication is working? Excellent   Financial problems or insurance changes? No   How many doses of your specialty medications were missed in the last 4 weeks? 0   Would patient like to speak to a pharmacist? No   When does the patient need to receive the medication? 08/16/23   Refill Delivery Questions    How will the patient receive the medication? MEDRx   When does the patient need to receive the medication? 08/16/23   Shipping Address Home   Address in [facility] confirmed and updated if neccessary? Yes   Expected Copay ($) 0   Is the patient able to afford the medication copay? Yes   Payment Method zero copay   Days supply of Refill 28   Supplies needed? No supplies needed   Refill activity completed? Yes   Refill activity plan Refill scheduled   Shipment/Pickup Date: 08/15/23            Current Outpatient Medications   Medication Sig    bisoprolol (ZEBETA) 5 MG tablet Take 1 tablet (5 mg total) by mouth once daily.    clindamycin (CLEOCIN) 300 MG capsule Take 1 capsule (300 mg total) by mouth every 8 (eight) hours.    cyanocobalamin 1,000 mcg/mL injection Inject 1 mL (1,000 mcg total) into the muscle every 28 days.    diazePAM (VALIUM) 10 MG Tab Take 1 tablet (10 mg  total) by mouth 3 (three) times daily.    diclofenac sodium (VOLTAREN) 1 % Gel Apply 2 g topically 4 (four) times daily.    docusate calcium (SURFAK) 240 mg capsule Take 240 mg by mouth once daily.    EScitalopram oxalate (LEXAPRO) 20 MG tablet Take 1 tablet (20 mg total) by mouth once daily.    furosemide (LASIX) 20 MG tablet Take 1 tablet (20 mg total) by mouth once daily.    gabapentin (NEURONTIN) 300 MG capsule Take 1 capsule (300 mg total) by mouth 2 (two) times daily.    lisinopriL 10 MG tablet Take 1 tablet (10 mg total) by mouth once daily.    naloxone (NARCAN) 4 mg/actuation Spry each nostril if not breathing or not responsive    ondansetron (ZOFRAN-ODT) 4 MG TbDL Take 1 tablet (4 mg total) by mouth every 8 (eight) hours as needed (nausea).    oxyCODONE (ROXICODONE) 15 MG Tab Take 1 tablet (15 mg total) by mouth every 6 (six) hours as needed for Pain.    risperiDONE (RISPERDAL) 0.5 MG Tab Take 1 tablet (0.5 mg total) by mouth once daily. along with 1 mg daily    risperiDONE (RISPERDAL) 1 MG tablet Take 1 tablet (1 mg total) by mouth 2 (two) times daily.    senna (SENOKOT) 8.6 mg tablet Take 1 tablet by mouth daily as needed for Constipation.    spironolactone (ALDACTONE) 100 MG tablet Take 1 tablet (100 mg total) by mouth once daily.    tenofovir alafenamide (VEMLIDY) 25 mg Tab Take 1 tablet (25 mg total) by mouth once daily.   Last reviewed on 5/15/2023 11:53 AM by Marleen Sun MD    Review of patient's allergies indicates:   Allergen Reactions    Pcn [penicillins] Hives    Morphine Other (See Comments)     heartburn    Bupropion hcl Other (See Comments)    Demerol [meperidine]     Last reviewed on  7/28/2023 11:40 AM by Tia Hernandez      Tasks added this encounter   No tasks added.   Tasks due within next 3 months   8/18/2023 - Refill Coordination Outreach (1 time occurrence)     Pasha Lennon, PharmD  [facility] - [facility]  [address] [place] [address]  Phone:  [phone]  Fax: [phone]

## 2023-08-15 NOTE — TELEPHONE ENCOUNTER
Specialty Pharmacy - Clinical Intervention  Specialty Pharmacy - Refill Coordination    Specialty Medication Orders Linked to Encounter      Flowsheet Row Most Recent Value   Medication #1 tenofovir alafenamide (VEMLIDY) 25 mg Tab (Order#[id number], Rx#[id number])          Refill Questions - Documented Responses      Flowsheet Row Most Recent Value   Patient Availability and HIPAA Verification    Does patient want to proceed with activity? Unable to Reach          We have had multiple attempts to the patient and have been unsuccessful to reach the patient. We will stop reaching out to the patient but in the event that the patient needs the med and contacts us, we will communicate and begin dispensing for the patient. At your next visit with the patient, please review the importance of being in contact with our specialty pharmacy as a part of our care team.    Attempt 3 for Vemlidy refill. Mailbox full. Mychart sent (8/7/23). [facility] last dispensed 30 DS on 7/13/23. RE-ENROLL IN REFILL MANAGEMENT AT CALL BACK.     Interventions added this encounter   Closed: [facility] Provider Intervention - Drug therapy adherence: tenofovir alafenamide (VEMLIDY) 25 mg Tab     Tia Caal, PharmD  [facility] - [facility] Pharm[facility]  [address] [facility] [address]  Phone: [phone]  Fax: [phone]

## 2023-08-21 DIAGNOSIS — F41.9 ANXIETY: ICD-10-CM

## 2023-08-21 DIAGNOSIS — K74.60 CIRRHOSIS OF LIVER WITHOUT ASCITES, UNSPECIFIED HEPATIC CIRRHOSIS TYPE: ICD-10-CM

## 2023-08-21 DIAGNOSIS — G89.4 CHRONIC PAIN SYNDROME: ICD-10-CM

## 2023-08-21 DIAGNOSIS — F31.32 BIPOLAR AFFECTIVE DISORDER, CURRENTLY DEPRESSED, MODERATE: ICD-10-CM

## 2023-08-21 DIAGNOSIS — I10 ESSENTIAL HYPERTENSION: ICD-10-CM

## 2023-08-21 RX ORDER — LISINOPRIL 10 MG/1
10 TABLET ORAL DAILY
Qty: 90 TABLET | Refills: 1 | Status: SHIPPED | OUTPATIENT
Start: 2023-08-21

## 2023-08-21 RX ORDER — DIAZEPAM 10 MG/1
10 TABLET ORAL 3 TIMES DAILY
Qty: 90 TABLET | Refills: 1 | Status: SHIPPED | OUTPATIENT
Start: 2023-08-21 | End: 2023-10-11 | Stop reason: SDUPTHER

## 2023-08-21 RX ORDER — BISOPROLOL FUMARATE 5 MG/1
5 TABLET, FILM COATED ORAL DAILY
Qty: 30 TABLET | Refills: 2 | Status: SHIPPED | OUTPATIENT
Start: 2023-08-21

## 2023-08-21 RX ORDER — ESCITALOPRAM OXALATE 20 MG/1
20 TABLET ORAL DAILY
Qty: 30 TABLET | Refills: 11 | Status: SHIPPED | OUTPATIENT
Start: 2023-08-21

## 2023-08-21 RX ORDER — SPIRONOLACTONE 100 MG/1
100 TABLET, FILM COATED ORAL DAILY
Qty: 30 TABLET | Refills: 5 | Status: SHIPPED | OUTPATIENT
Start: 2023-08-21

## 2023-08-21 RX ORDER — GABAPENTIN 300 MG/1
300 CAPSULE ORAL 2 TIMES DAILY
Qty: 60 CAPSULE | Refills: 2 | Status: SHIPPED | OUTPATIENT
Start: 2023-08-21 | End: 2023-11-27 | Stop reason: SDUPTHER

## 2023-08-21 NOTE — TELEPHONE ENCOUNTER
The patient's prescription has been approved and sent to   [facility] [address]  [address] [facility] [address]  Phone: [phone] Fax: [phone]
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
## 2023-08-28 ENCOUNTER — TELEPHONE (OUTPATIENT)
Dept: FAMILY MEDICINE | Facility: CLINIC | Age: 48
End: 2023-08-28

## 2023-08-28 NOTE — TELEPHONE ENCOUNTER
----- Message from Sara Buitrago sent at 8/28/2023 12:24 PM CDT -----  Pt needs a new appt with Dr. Sun, she missed the last appt. Please advise [phone]

## 2023-08-28 NOTE — TELEPHONE ENCOUNTER
----- Message from Sara Buitrago sent at 8/28/2023  2:04 PM CDT -----  Pt needs refill on Oxycodone  [phone]   [facility]

## 2023-09-01 DIAGNOSIS — G89.4 CHRONIC PAIN SYNDROME: ICD-10-CM

## 2023-09-01 RX ORDER — OXYCODONE HYDROCHLORIDE 15 MG/1
15 TABLET ORAL EVERY 6 HOURS PRN
Qty: 120 TABLET | Refills: 0 | Status: SHIPPED | OUTPATIENT
Start: 2023-09-02 | End: 2023-09-27 | Stop reason: SDUPTHER

## 2023-09-01 NOTE — TELEPHONE ENCOUNTER
The patient's prescription has been approved and sent to [facility] - [facility] [address]  [address] [facility] [address]  Phone: [phone] Fax: [phone]

## 2023-09-01 NOTE — TELEPHONE ENCOUNTER
----- Message from Maren Mccrary sent at 9/1/2023 10:34 AM CDT -----  Patient called and stated that she need a refill of her oxycodone, she stated that [facility] a [facility], so [facility] into [facility] @ [phone] and she did not get the fax number. She stated that she need it called in today because of the holiday weekend. If any questions please give her a call at [phone]

## 2023-09-11 ENCOUNTER — TELEPHONE (OUTPATIENT)
Dept: HEMATOLOGY/ONCOLOGY | Facility: CLINIC | Age: 48
End: 2023-09-11
Payer: MEDICAID

## 2023-09-11 NOTE — TELEPHONE ENCOUNTER
Was not able to lvm.   ----- Message from Maria Eugenia Mccullough, Patient Care Assistant sent at 9/11/2023  1:09 PM CDT -----  Type: Needs Medical Advice  Who Called:  eliza  Best Call Back Number: [phone]    Additional Information: eliza is wanting to reschedule her 9/25 , patient doesn't have phone right  now  please call to further discuss, thanks

## 2023-09-18 ENCOUNTER — TELEPHONE (OUTPATIENT)
Dept: FAMILY MEDICINE | Facility: CLINIC | Age: 48
End: 2023-09-18

## 2023-09-18 DIAGNOSIS — Z00.6 RESEARCH STUDY PATIENT: Primary | ICD-10-CM
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
## 2023-09-27 ENCOUNTER — TELEPHONE (OUTPATIENT)
Dept: FAMILY MEDICINE | Facility: CLINIC | Age: 48
End: 2023-09-27

## 2023-09-27 DIAGNOSIS — G89.4 CHRONIC PAIN SYNDROME: ICD-10-CM

## 2023-09-27 RX ORDER — MUPIROCIN 20 MG/G
OINTMENT TOPICAL 3 TIMES DAILY
Qty: 22 G | Refills: 0 | Status: SHIPPED | OUTPATIENT
Start: 2023-09-27 | End: 2023-10-11

## 2023-09-27 RX ORDER — OXYCODONE HYDROCHLORIDE 15 MG/1
15 TABLET ORAL EVERY 6 HOURS PRN
Qty: 120 TABLET | Refills: 0 | Status: SHIPPED | OUTPATIENT
Start: 2023-10-01 | End: 2023-10-11 | Stop reason: SDUPTHER

## 2023-09-27 NOTE — TELEPHONE ENCOUNTER
----- Message from Faby Del Cid sent at 9/27/2023  9:26 AM CDT -----  Pt is calling back   [phone]

## 2023-09-27 NOTE — TELEPHONE ENCOUNTER
Spoke with pt. States she thought someone from this number called her back. Advised pt that her Message has been sent to Dr. Sun for med refills. She voiced understanding.
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
## 2023-09-27 NOTE — TELEPHONE ENCOUNTER
----- Message from Faby Del Cid sent at 9/27/2023  9:26 AM CDT -----  Pt is calling back   [phone]

## 2023-09-28 ENCOUNTER — TELEPHONE (OUTPATIENT)
Dept: FAMILY MEDICINE | Facility: CLINIC | Age: 48
End: 2023-09-28

## 2023-09-28 NOTE — TELEPHONE ENCOUNTER
Spoke with patient who was confused about her medication. She wanted to make sure she had refills to [facility] pharmacy. She read off the names of a few including bactroban, oxycodone, and gabapentin and I let her know the refills are at the pharmacy.

## 2023-09-28 NOTE — TELEPHONE ENCOUNTER
Let pt know that she needs to try to find a doctor closer to where she lives if she is going to have a transportation problem.

## 2023-09-28 NOTE — TELEPHONE ENCOUNTER
----- Message from Maren Mccrary sent at 9/28/2023  3:46 PM CDT -----  Patient called and stated that her prescription was sent in and the doctor sent it in for Sunday and she need to have the prescription sent for Saturday because that is only day she will have a ride to go pick them up. Please give her a call at [phone]

## 2023-09-28 NOTE — TELEPHONE ENCOUNTER
Attempted to reach Pt regarding message. Unable to reach Pt. Phone states that number is not accepting calls.

## 2023-09-28 NOTE — TELEPHONE ENCOUNTER
The patient's prescription has been approved and sent to [facility] [address]  [address] [facility] [address]  Phone: [phone] Fax: [phone]

## 2023-09-28 NOTE — TELEPHONE ENCOUNTER
----- Message from Maren Mccrary sent at 9/28/2023 10:22 AM CDT -----  Patient called and stated that she need to speak to the nurse because [facility]. Please give her a call at [phone]

## 2023-10-02 NOTE — PROGRESS NOTES
"PATIENT: Marie Damon  MRN: [id number]  DATE: 10/3/2023      Diagnosis:   1. Pancytopenia    2. Cirrhosis of liver without ascites, unspecified hepatic cirrhosis type    3. Hepatitis B surface antigen positive    4. Anasarca    5. Chronic pain syndrome    6. Hyperkalemia    7. Impetigo          Chief Complaint: Anemia (Follow up w/ labs)      Subjective:   HPI: Ms. Damon is a 48 y.o. female with HTN, anxiety, DDD, chronic low back pain who is seen today for follow-up of thrombocytopenia.        Today 10/3/23  Last saw hepatology in April, she was advised to take Tenofovir and follow up in 3 months. Felt symptoms would improve with antivirals and low dose diuretics. She has not followed up.     Was seen in the ER on 9/22/23 for impetigo and was given Bactrim and Bactroban. She was also given lactulose for slight elevation in ammonia level. Appointment to follow up with PCP is planned.     Potassium level elevated today. When asked about her medications, she has not taken her Lasix in at least 3 weeks. Missed Spironolactone for 3 weeks, just filled the medication yesterday. Taking Lisinopril 10 mg daily and Bisoprolol daily.     Takes a B12 supplement daily.     Has transportation and family difficulties that cause problems with appointment compliance.     Endorses fatigue, abd discomfort, occasional nausea, swelling BLE.   Denies HA, CP, SOB, diarrhea, constipation, melena, hematochezia, dysuria, hematuria, fevers, chills.     Hematological history:   6/3/22 she presented to the ER at [facility] for abdominal pain, nausea, and diarrhea.   CT A/P was done with the following findings: "The liver slightly decreased size and irregular contour indicating cirrhosis.  No discrete focal lesion.  Gallbladder removed.  Pancreas normal.  Stomach decompressed.  Spleen is severely enlarged measuring 17.5 cm across.  Numerous periportal, splenic hilar, mesenteric, omental collateral vessels.  Generalized hazy density throughout the " "mesentery more significant centrally.  Adrenal glands normal.  Kidneys normal size and contour with no hydronephrosis.  Aorta tapers normally.  No bowel obstruction, ascites, or significant lymphadenopathy seen.  Bladder and rectum normal.  Bones intact.  Lung bases clear."  Lab results show patient was anemic with Hgb 10.g/dL, low WBC at 2.61 and platelet count of 64k. Elevated Alk/Phos, AST, ALT. She was discharged with follow up referrals to GI and this office.   Was seen by GI at [facility] yesterday and was given Rx for Metronidazole that she has not yet started. No additional blood work was done at that visit. She is scheduled to go back in 2 weeks for follow up of results of stool studies.   No family history of hematologic cancers or blood disorders. Mother had thyroid cancer.   Personal history of cholecystectomy and hysterectomy.   She has not experienced recurrent infections or prolonged use of antibiotic.   Social history includes 33 pack year smoking history, minimal alcohol use. 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 She states her drug use last for a few years, she has not used illicit drugs in approximately 15 years. Denies high risk sexual activity.    Had multiple blood tests in 6/2022 with significant findings of Hep B s antigen positive, positive MICHAEL, and thrombocytopenia. She presented with significant swelling of her lower ext bilateral as well as distension of her abdomen. Patient was referred to see hepatology; attempted to schedule patient for an alex on 7/12/22 with Dr Mason was not successful since patient phone got hacked " per patient". Patient wanted "something done" in the clinic, explain to her that she needs to see a hepatology and she needs lasix with possible paracentesis and the fastest way to get this done is to go to the ER    Past Medical History:   Past Medical History:   Diagnosis Date    Bone spur     hip    HTN (hypertension) "        Past Surgical HIstory:   Past Surgical History:   Procedure Laterality Date    CHOLECYSTECTOMY      HYSTERECTOMY         Family History: No family history on file.    Social History:  reports that she has been smoking cigarettes. She has been exposed to tobacco smoke. She has never used smokeless tobacco. She reports that she does not currently use alcohol. She reports that she does not currently use drugs.    Allergies:  Review of patient's allergies indicates:   Allergen Reactions    Pcn [penicillins] Hives    Morphine Other (See Comments)     heartburn    Bupropion hcl Other (See Comments)    Demerol [meperidine]        Medications:  Current Outpatient Medications   Medication Sig Dispense Refill    bisoprolol (ZEBETA) 5 MG tablet Take 1 tablet (5 mg total) by mouth once daily. 30 tablet 2    clindamycin (CLEOCIN) 300 MG capsule Take 1 capsule (300 mg total) by mouth every 8 (eight) hours. 30 capsule 0    cyanocobalamin 1,000 mcg/mL injection Inject 1 mL (1,000 mcg total) into the muscle every 28 days. 30 mL 0    diazePAM (VALIUM) 10 MG Tab Take 1 tablet (10 mg total) by mouth 3 (three) times daily. 90 tablet 1    diclofenac sodium (VOLTAREN) 1 % Gel Apply 2 g topically 4 (four) times daily. 100 g 0    dicyclomine (BENTYL) 10 MG capsule TAKE 1 CASPULE BY MOUTH 4 TIMES A DAY BEFORE MEALS AND NIGHTLY      EScitalopram oxalate (LEXAPRO) 20 MG tablet Take 1 tablet (20 mg total) by mouth once daily. 30 tablet 11    furosemide (LASIX) 20 MG tablet Take 1 tablet (20 mg total) by mouth once daily. 30 tablet 2    gabapentin (NEURONTIN) 300 MG capsule Take 1 capsule (300 mg total) by mouth 2 (two) times daily. 60 capsule 2    GAVILYTE-G 236-22.74-6.74 -5.86 gram suspension Take by mouth.      lactulose (CHRONULAC) 20 gram/30 mL Soln Take 15 mLs (10 g total) by mouth 2 (two) times daily. 900 mL 0    lisinopriL 10 MG tablet Take 1 tablet (10 mg total) by mouth once daily. 90 tablet 1    mupirocin (BACTROBAN) 2 %  ointment Apply topically 3 (three) times daily. for 7 days 22 g 0    naloxone (NARCAN) 4 mg/actuation Spry each nostril if not breathing or not responsive 1 each 2    ondansetron (ZOFRAN-ODT) 4 MG TbDL Take 1 tablet (4 mg total) by mouth every 8 (eight) hours as needed (nausea). 30 tablet 0    oxyCODONE (ROXICODONE) 15 MG Tab Take 1 tablet (15 mg total) by mouth every 6 (six) hours as needed for Pain. 120 tablet 0    risperiDONE (RISPERDAL) 0.5 MG Tab Take 1 tablet (0.5 mg total) by mouth once daily. along with 1 mg daily 30 tablet 1    risperiDONE (RISPERDAL) 1 MG tablet Take 1 tablet (1 mg total) by mouth 2 (two) times daily. 60 tablet 2    senna (SENOKOT) 8.6 mg tablet Take 1 tablet by mouth daily as needed for Constipation. 30 tablet 2    spironolactone (ALDACTONE) 100 MG tablet Take 1 tablet (100 mg total) by mouth once daily. 30 tablet 5    tenofovir alafenamide (VEMLIDY) 25 mg Tab Take 1 tablet (25 mg total) by mouth once daily. 30 tablet 11     No current facility-administered medications for this visit.     Facility-Administered Medications Ordered in Other Visits   Medication Dose Route Frequency Provider Last Rate Last Admin    heparin, porcine (PF) 100 unit/mL injection flush 500 Units  500 Units Intravenous PRN Sandra Wolfe, NP        sodium chloride 0.9% flush 10 mL  10 mL Intravenous PRN Sandra Wolfe NP           Review of Systems   Constitutional:  Positive for fatigue. Negative for activity change, appetite change, chills and fever.   HENT:  Negative for nosebleeds and trouble swallowing.    Respiratory:  Negative for cough and shortness of breath.    Cardiovascular:  Negative for chest pain, palpitations and leg swelling.   Gastrointestinal:  Negative for abdominal distention, abdominal pain, constipation, diarrhea, nausea and vomiting.   Genitourinary:  Negative for dysuria and hematuria.   Musculoskeletal:  Positive for back pain.   Skin:  Negative for pallor and rash.   Neurological:   Negative for light-headedness and headaches.   Hematological:  Negative for adenopathy. Does not bruise/bleed easily.   Psychiatric/Behavioral:  The patient is nervous/anxious.        ECOG Performance Status:   ECOG SCORE    1 - Restricted in strenuous activity-ambulatory and able to carry out work of a light nature         Objective:      Vitals:   Vitals:    10/03/23 1348   BP: (!) 104/58   BP Location: Right arm   Patient Position: Sitting   BP Method: Medium (Automatic)   Pulse: 68   Temp: 98.4 °F (36.9 °C)   TempSrc: Temporal   SpO2: 99%   Weight: 99.6 kg (219 lb 9.3 oz)       BMI: Body mass index is 30.62 kg/m².    Physical Exam  Vitals reviewed.   Constitutional:       General: She is not in acute distress.     Appearance: She is not diaphoretic.   HENT:      Head: Normocephalic and atraumatic.      Mouth/Throat:      Mouth: Mucous membranes are moist.   Eyes:      General: No scleral icterus.  Cardiovascular:      Rate and Rhythm: Normal rate and regular rhythm.      Pulses: Normal pulses.      Heart sounds: Normal heart sounds. No murmur heard.  Pulmonary:      Effort: Pulmonary effort is normal. No respiratory distress.      Breath sounds: Normal breath sounds. No wheezing or rhonchi.   Abdominal:      General: Bowel sounds are normal. There is no distension.      Palpations: Abdomen is soft. There is no mass.      Tenderness: There is no abdominal tenderness. There is no guarding or rebound.   Musculoskeletal:      Cervical back: No tenderness.      Right lower leg: No edema.      Left lower leg: No edema.   Lymphadenopathy:      Cervical: No cervical adenopathy.   Skin:     General: Skin is warm.      Coloration: Skin is not jaundiced.      Findings: No bruising.      Comments: Scabbed areas on BLE   Neurological:      Mental Status: She is alert and oriented to person, place, and time.      Gait: Gait normal.   Psychiatric:         Mood and Affect: Mood normal.         Laboratory Data:  Lab Results    Component Value Date    WBC 5.43 10/03/2023    HGB 9.3 (L) 10/03/2023    HCT 31.3 (L) 10/03/2023    MCV 96 10/03/2023     (L) 10/03/2023          Imaging:   EXAMINATION:  US ABDOMEN COMPLETE     CLINICAL HISTORY:  eval for liver disease, portal HTN, include spleen assessment;.     Chronic viral hepatitis B without delta-agent     TECHNIQUE:  Complete ultrasound evaluation of the abdomen (including the pancreas, aorta, IVC, liver gallbladder, common bile duct,kidneys, and spleen) was performed utilizing grayscale and color flow imaging.     COMPARISON:  Abdominal ultrasound-08/23/2022     FINDINGS:  Pancreas: The pancreas is normal in echogenicity without focal mass or definite pseudocyst where visualized.     Aorta: The visualized abdominal aorta is normal is caliber without evidence of aneurysm.     Liver: The liver is enlarged in size measuring 17 cm in sagittal dimension.  The liver demonstrates a coarse heterogeneous parenchymal echotexture and micronodular contour compatible with the provided history of hepatitis B..  No new solid hepatic mass appreciated..  There is a 16% caliber change in the size of the main portal vein between quiet respiration and deep inspiration.  Peak systolic velocities within the main portal vein measures 17 cm/s.  No portal venous thrombosis.  The IVC is patent where visualized.     Biliary system: The gallbladder is surgically absent.The common bile duct is mildly dilated, measuring up to 14 mm, likely explain by previous cholecystectomy.  No intrahepatic biliary ductal dilatation.     Kidneys: The kidneys are normal in size measuring 10.1 cm on the right and 12.1 cm on the left.No hydronephrosis, stones, or renal mass.     Spleen: The spleen isenlargedin size measuring 19.1 x 6.7 cm and shows a normal homogenous parenchymal echotexture without solid mass.     Miscellaneous: No ascites.     Impression:     1. Hepatosplenomegaly and diffuse coarse heterogeneous hepatic  parenchymal echotexture.  No new solid hepatic mass.  2. Status post cholecystectomy which likely explains mild extrahepatic biliary dilatation.  No intrahepatic biliary dilatation appreciated.        Electronically signed by: Ricki Vieira MD  Date:                                            05/08/2023  Time:                                           13:26    Assessment:       1. Pancytopenia    2. Cirrhosis of liver without ascites, unspecified hepatic cirrhosis type    3. Hepatitis B surface antigen positive    4. Anasarca    5. Chronic pain syndrome    6. Hyperkalemia    7. Impetigo           Plan:   Pancytopenia   -Likely due to the below (cirrhosis, active viral infection), possible decompensation (GI bleeding)  -Platelets show improvement, Hgb is down to 9.3 g/dL today  -Had been seen by GI (Dr. Acuna) and had appointment for upper and lower endoscopies but was not able to keep appointments due to family and transportation difficulties. Explained to patient she needs to be seen and undergo scopes to make sure no GI bleeding/varicose/ulcer; Encouraged patient to please reschedule these appointments   -Currently no need for transfusion, asymptomatic. Will add Iron/TIBC, Ferritin to labs today. Replete with IV iron if needed.  -Continue to monitor with follow-up in 4-6 weeks, labs prior to appointment    Cirrhosis/hepatitis B; anasarca  -CT A/P shows splenomegaly and findings consistent with cirrhosis  -Discussed CT results with patient and need for further work up with multiple blood tests  -Hx of IV drug use certainly puts her at risk for underlying viral cause , negative HIV, hepatitis panel positive for HepB surface and C IgM, and recd to get Hep B DNA PCR; given her the diagnosis of possible acute hepatitis B   -LFT's have improved   -Has been seen by hepatology Dr Mason, missed last follow up and needs to reschedule      Chronic pain syndrome  -pain medications per primary  care    Impetigo  -Completed antibiotic therapy (Clindamycin) last evening   -To follow with PCP     Hyperkalemia   -Potassium level today is 6.4, repeated prior to infusion with results at 7.3; some hemolyzed cells per the lab  -Will send her up for IV fluids today and repeat BMP after  -Hold Lisinopril until able to follow up with primary care  -Have reached out to PCP, Dr. Sun via secure chat regarding need for follow up labs/appointment later this week; she agreed. Dr. Sun's office to contact the patient regarding an appointment.   -Will send Mgnssirood04 grams with instructions to take today and repeat the dose tomorrow. Rx sent to [facility]'s    CKD   -Cr is elevated at 1.6, BUN 32  -Will give IV fluids today and repeat BMP after   -To follow up with PCP within the week    Gave patient written instructions to hold Lisinopril until able to see Dr. Sun, make follow-up with Dr. Sun, Dr. Msaon, and Dr. Armand NEWMAN.     Assessment/Plan reviewed and approved by Dr. Woo   Patient queried and all questions were answered.   60 minutes were spent in coordination of patient's care, record review and counseling.      Route Chart for Scheduling    Med Onc Chart Routing      Follow up with physician 4 weeks. Needs to establish with new MD (Dr. Woo) with CBC, CMP, B12 level, Folate level prior to appointment   Follow up with ELISHA    Infusion scheduling note   IV fluids today, repeat BMP after fluids   Injection scheduling note    Labs    Imaging    Pharmacy appointment    Other referrals                    Therapy Plan Information  IV Fluids  sodium chloride 0.9% bolus 1,000 mL 1,000 mL  1,000 mL, Intravenous, Every visit  Flushes  sodium chloride 0.9% flush 10 mL  10 mL, Intravenous, PRN  heparin, porcine (PF) 100 unit/mL injection flush 500 Units  500 Units, Intravenous, PRN

## 2023-10-03 ENCOUNTER — RESEARCH ENCOUNTER (OUTPATIENT)
Dept: RESEARCH | Facility: HOSPITAL | Age: 48
End: 2023-10-03
Payer: MEDICAID

## 2023-10-03 ENCOUNTER — OFFICE VISIT (OUTPATIENT)
Dept: HEMATOLOGY/ONCOLOGY | Facility: CLINIC | Age: 48
End: 2023-10-03
Payer: MEDICAID

## 2023-10-03 ENCOUNTER — INFUSION (OUTPATIENT)
Dept: INFUSION THERAPY | Facility: HOSPITAL | Age: 48
End: 2023-10-03
Payer: MEDICAID

## 2023-10-03 ENCOUNTER — DOCUMENTATION ONLY (OUTPATIENT)
Dept: HEMATOLOGY/ONCOLOGY | Facility: CLINIC | Age: 48
End: 2023-10-03
Payer: MEDICAID

## 2023-10-03 ENCOUNTER — TELEPHONE (OUTPATIENT)
Dept: FAMILY MEDICINE | Facility: CLINIC | Age: 48
End: 2023-10-03

## 2023-10-03 VITALS
SYSTOLIC BLOOD PRESSURE: 110 MMHG | OXYGEN SATURATION: 99 % | BODY MASS INDEX: 30.74 KG/M2 | DIASTOLIC BLOOD PRESSURE: 62 MMHG | WEIGHT: 219.56 LBS | HEART RATE: 78 BPM | TEMPERATURE: 98 F | HEIGHT: 71 IN | RESPIRATION RATE: 18 BRPM

## 2023-10-03 VITALS
WEIGHT: 219.56 LBS | HEART RATE: 68 BPM | DIASTOLIC BLOOD PRESSURE: 58 MMHG | OXYGEN SATURATION: 99 % | TEMPERATURE: 98 F | SYSTOLIC BLOOD PRESSURE: 104 MMHG | BODY MASS INDEX: 30.62 KG/M2

## 2023-10-03 DIAGNOSIS — R76.8 HEPATITIS B SURFACE ANTIGEN POSITIVE: ICD-10-CM

## 2023-10-03 DIAGNOSIS — D61.818 PANCYTOPENIA: Primary | ICD-10-CM

## 2023-10-03 DIAGNOSIS — R60.1 ANASARCA: ICD-10-CM

## 2023-10-03 DIAGNOSIS — G89.4 CHRONIC PAIN SYNDROME: ICD-10-CM

## 2023-10-03 DIAGNOSIS — E87.6 HYPOKALEMIA: Primary | ICD-10-CM

## 2023-10-03 DIAGNOSIS — K74.60 CIRRHOSIS OF LIVER WITHOUT ASCITES, UNSPECIFIED HEPATIC CIRRHOSIS TYPE: ICD-10-CM

## 2023-10-03 DIAGNOSIS — E87.5 HYPERKALEMIA: ICD-10-CM

## 2023-10-03 DIAGNOSIS — L01.00 IMPETIGO: ICD-10-CM

## 2023-10-03 LAB
ANION GAP SERPL CALC-SCNC: 10 MMOL/L (ref 8–16)
BUN SERPL-MCNC: 32 MG/DL (ref 6–20)
CALCIUM SERPL-MCNC: 8.5 MG/DL (ref 8.7–10.5)
CHLORIDE SERPL-SCNC: 109 MMOL/L (ref 95–110)
CO2 SERPL-SCNC: 19 MMOL/L (ref 23–29)
CREAT SERPL-MCNC: 1.6 MG/DL (ref 0.5–1.4)
EST. GFR  (NO RACE VARIABLE): 39 ML/MIN/1.73 M^2
GLUCOSE SERPL-MCNC: 85 MG/DL (ref 70–110)
POTASSIUM SERPL-SCNC: 7.3 MMOL/L (ref 3.5–5.1)
SODIUM SERPL-SCNC: 138 MMOL/L (ref 136–145)

## 2023-10-03 PROCEDURE — 96361 HYDRATE IV INFUSION ADD-ON: CPT | Mod: PN

## 2023-10-03 PROCEDURE — 96360 HYDRATION IV INFUSION INIT: CPT | Mod: PN

## 2023-10-03 PROCEDURE — 3008F PR BODY MASS INDEX (BMI) DOCUMENTED: ICD-10-PCS | Mod: CPTII,,,

## 2023-10-03 PROCEDURE — 99999 PR PBB SHADOW E&M-EST. PATIENT-LVL IV: ICD-10-PCS | Mod: PBBFAC,,,

## 2023-10-03 PROCEDURE — 3008F BODY MASS INDEX DOCD: CPT | Mod: CPTII,,,

## 2023-10-03 PROCEDURE — 25000003 PHARM REV CODE 250: Mod: PN

## 2023-10-03 PROCEDURE — 4010F ACE/ARB THERAPY RXD/TAKEN: CPT | Mod: CPTII,,,

## 2023-10-03 PROCEDURE — 3078F DIAST BP <80 MM HG: CPT | Mod: CPTII,,,

## 2023-10-03 PROCEDURE — 3078F PR MOST RECENT DIASTOLIC BLOOD PRESSURE < 80 MM HG: ICD-10-PCS | Mod: CPTII,,,

## 2023-10-03 PROCEDURE — 4010F PR ACE/ARB THEARPY RXD/TAKEN: ICD-10-PCS | Mod: CPTII,,,

## 2023-10-03 PROCEDURE — 3074F PR MOST RECENT SYSTOLIC BLOOD PRESSURE < 130 MM HG: ICD-10-PCS | Mod: CPTII,,,

## 2023-10-03 PROCEDURE — 99999 PR PBB SHADOW E&M-EST. PATIENT-LVL IV: CPT | Mod: PBBFAC,,,

## 2023-10-03 PROCEDURE — 99215 OFFICE O/P EST HI 40 MIN: CPT | Mod: S$PBB,,,

## 2023-10-03 PROCEDURE — 3074F SYST BP LT 130 MM HG: CPT | Mod: CPTII,,,

## 2023-10-03 PROCEDURE — 99214 OFFICE O/P EST MOD 30 MIN: CPT | Mod: PBBFAC,PN,25

## 2023-10-03 PROCEDURE — 80048 BASIC METABOLIC PNL TOTAL CA: CPT | Mod: PN

## 2023-10-03 PROCEDURE — 99215 PR OFFICE/OUTPT VISIT, EST, LEVL V, 40-54 MIN: ICD-10-PCS | Mod: S$PBB,,,

## 2023-10-03 RX ORDER — SODIUM CHLORIDE 0.9 % (FLUSH) 0.9 %
10 SYRINGE (ML) INJECTION
OUTPATIENT
Start: 2023-10-03

## 2023-10-03 RX ORDER — DICYCLOMINE HYDROCHLORIDE 10 MG/1
CAPSULE ORAL
COMMUNITY
Start: 2023-04-30 | End: 2023-10-27

## 2023-10-03 RX ORDER — SODIUM POLYSTYRENE SULFONATE 4.1 MEQ/G
POWDER, FOR SUSPENSION ORAL; RECTAL
Qty: 30 G | Refills: 0 | Status: SHIPPED | OUTPATIENT
Start: 2023-10-03 | End: 2023-11-02

## 2023-10-03 RX ORDER — HEPARIN 100 UNIT/ML
500 SYRINGE INTRAVENOUS
Status: DISCONTINUED | OUTPATIENT
Start: 2023-10-03 | End: 2023-10-03 | Stop reason: HOSPADM

## 2023-10-03 RX ORDER — POLYETHYLENE GLYCOL-3350 AND ELECTROLYTES 236; 6.74; 5.86; 2.97; 22.74 G/274.31G; G/274.31G; G/274.31G; G/274.31G; G/274.31G
POWDER, FOR SOLUTION ORAL
COMMUNITY
Start: 2023-05-31

## 2023-10-03 RX ORDER — HEPARIN 100 UNIT/ML
500 SYRINGE INTRAVENOUS
OUTPATIENT
Start: 2023-10-03

## 2023-10-03 RX ORDER — SODIUM CHLORIDE 9 MG/ML
1000 INJECTION, SOLUTION INTRAVENOUS
Status: COMPLETED | OUTPATIENT
Start: 2023-10-03 | End: 2023-10-03

## 2023-10-03 RX ORDER — SODIUM CHLORIDE 0.9 % (FLUSH) 0.9 %
10 SYRINGE (ML) INJECTION
Status: DISCONTINUED | OUTPATIENT
Start: 2023-10-03 | End: 2023-10-03 | Stop reason: HOSPADM

## 2023-10-03 RX ADMIN — SODIUM CHLORIDE 1000 ML: 9 INJECTION, SOLUTION INTRAVENOUS at 03:10

## 2023-10-03 NOTE — PLAN OF CARE
Problem: Adult Inpatient Plan of Care  Goal: Plan of Care Review  Outcome: Ongoing, Progressing  Flowsheets (Taken 10/3/2023 1456)  Plan of Care Reviewed With: patient  Goal: Patient-Specific Goal (Individualized)  Outcome: Ongoing, Progressing  Flowsheets (Taken 10/3/2023 1456)  Anxieties, Fears or Concerns: nonE  Individualized Care Needs: recliner, dim lights, warm blanket     Problem: Fatigue  Goal: Improved Activity Tolerance  Outcome: Ongoing, Progressing  Intervention: Promote Improved Energy  Flowsheets (Taken 10/3/2023 1456)  Fatigue Management: frequent rest breaks encouraged  Sleep/Rest Enhancement:   natural light exposure provided   noise level reduced  Activity Management:   Ambulated -L4   Ambulated to bathroom - L4   Ambulated in [place] - L4   Up in chair - L3   Pt tolerated IV hydration infusion well.   No adverse reaction noted.  PIV de-accessed per protocol.   Pt left clinic in no acute distress.

## 2023-10-03 NOTE — PROGRESS NOTES
RESEARCH STUDY SPECIMEN COLLECTION ENCOUNTER  ORGAN TRANSPLANT  [facility]    Study Title: Role of Tumor-Induced Immune Tolerance in the Patient Response to Locoregional Therapy: Implications in Assessment Risk of Hepatocellular Carcinoma Recurrence Following Liver Transplantation    IRB #: [id number]    IRB Approval Date: 6/8/2016    : Lso Logan MD  Sub-investigator: Artie Mcbride, PhD    Patient Number: [id number]    In accordance with the study protocol, Research Lab orders were placed and follow-up specimens were collected on (date: 10/03/2023) in:  [facility] VNP: YES/NO: No  [facility] LABTX: YES/NO: No  [facility] IM: YES/NO: No  Other [facility] location: YES/NO: Yes   Location: [facility]    A  was used to transport blood specimens to [facility] for processing: YES/NO: Yes  Blood specimens were transported to [facility] for processing: YES/NO: Yes    Andrea Bonner  Admin Research- Liver Transplant

## 2023-10-03 NOTE — TELEPHONE ENCOUNTER
Attempted to call. Number on chart not in service.   Called number listed in a previous message. Unable to leave a VM.

## 2023-10-03 NOTE — TELEPHONE ENCOUNTER
----- Message from Sandra Wolfe NP sent at 10/3/2023  2:38 PM CDT -----  Good afternoon, I have Marie in clinic today to follow up on her thrombocytopenia which is stable. Her anemia has worsened and I am running additional labs today. I am reaching out due to her worsening kidney function and elevated Potassium levels. I am sending her to our infusion suite for IV fluids and repeat the K+ level, will send Kayexalate if needed. and I have asked her to hold her Lisinopril and get back in with Dr. Sun to follow up on these values. Can someone from your office reach out to her to get her set up for labs/follow-up?  Thank you  SRINATH Krishnamurthy-BC

## 2023-10-04 ENCOUNTER — TELEPHONE (OUTPATIENT)
Dept: HEMATOLOGY/ONCOLOGY | Facility: CLINIC | Age: 48
End: 2023-10-04
Payer: MEDICAID

## 2023-10-04 ENCOUNTER — DOCUMENTATION ONLY (OUTPATIENT)
Dept: HEMATOLOGY/ONCOLOGY | Facility: CLINIC | Age: 48
End: 2023-10-04
Payer: MEDICAID

## 2023-10-04 ENCOUNTER — PATIENT MESSAGE (OUTPATIENT)
Dept: HEMATOLOGY/ONCOLOGY | Facility: CLINIC | Age: 48
End: 2023-10-04
Payer: MEDICAID

## 2023-10-04 NOTE — PROGRESS NOTES
Attempted to call patient to follow-up with her regarding her need for follow-up lab  appointment and need to r/s PCP appointment. Also, to verify she picked up Kayexalate from the pharmacy. I called her phone on record x 5 times today with a busy signal, attempted her mother's contact number x 3 today with no option for voicemail. Message sent via [facility] for patient to call the clinic.   I called [facility]'s pharmacy and confirmed that the patient did go there yesterday to  the Kayexalate but they had to order the medication. It arrived today and they sent a message to the patient's phone regarding pick-up; the medication had not been picked up yet.   Will attempt to reach her again tomorrow.

## 2023-10-04 NOTE — TELEPHONE ENCOUNTER
left portal message informing pt that we have been trying to contact her in regards to her lab work and a presc that Sandra called in for pt. Unable to reach pt by phone or her Mom's phone.

## 2023-10-04 NOTE — TELEPHONE ENCOUNTER
tried calling pt and I kept getting a fast busy. Tried calling the mother's phone and unable to leave a msg due to no voicemail set up.

## 2023-10-05 ENCOUNTER — DOCUMENTATION ONLY (OUTPATIENT)
Dept: HEMATOLOGY/ONCOLOGY | Facility: CLINIC | Age: 48
End: 2023-10-05
Payer: MEDICAID

## 2023-10-05 NOTE — PROGRESS NOTES
Attempted to call patient on both phone numbers in her chart again today (2 attempts), no answer, no option for voice mail.

## 2023-10-11 ENCOUNTER — OFFICE VISIT (OUTPATIENT)
Dept: FAMILY MEDICINE | Facility: CLINIC | Age: 48
End: 2023-10-11
Payer: MEDICAID

## 2023-10-11 VITALS
WEIGHT: 197 LBS | HEART RATE: 72 BPM | BODY MASS INDEX: 27.48 KG/M2 | SYSTOLIC BLOOD PRESSURE: 134 MMHG | DIASTOLIC BLOOD PRESSURE: 86 MMHG

## 2023-10-11 DIAGNOSIS — F41.9 ANXIETY: ICD-10-CM

## 2023-10-11 DIAGNOSIS — Z63.9 FAMILY PROBLEMS: ICD-10-CM

## 2023-10-11 DIAGNOSIS — Z79.899 LONG-TERM USE OF HIGH-RISK MEDICATION: ICD-10-CM

## 2023-10-11 DIAGNOSIS — I10 ESSENTIAL HYPERTENSION: Primary | ICD-10-CM

## 2023-10-11 DIAGNOSIS — G89.4 CHRONIC PAIN SYNDROME: ICD-10-CM

## 2023-10-11 DIAGNOSIS — E87.5 HYPERKALEMIA: ICD-10-CM

## 2023-10-11 DIAGNOSIS — F31.32 BIPOLAR AFFECTIVE DISORDER, CURRENTLY DEPRESSED, MODERATE: ICD-10-CM

## 2023-10-11 DIAGNOSIS — K74.60 CIRRHOSIS OF LIVER WITHOUT ASCITES, UNSPECIFIED HEPATIC CIRRHOSIS TYPE: ICD-10-CM

## 2023-10-11 PROCEDURE — 3008F PR BODY MASS INDEX (BMI) DOCUMENTED: ICD-10-PCS | Mod: CPTII,S$GLB,, | Performed by: FAMILY MEDICINE

## 2023-10-11 PROCEDURE — 3079F DIAST BP 80-89 MM HG: CPT | Mod: CPTII,S$GLB,, | Performed by: FAMILY MEDICINE

## 2023-10-11 PROCEDURE — 1159F MED LIST DOCD IN RCRD: CPT | Mod: CPTII,S$GLB,, | Performed by: FAMILY MEDICINE

## 2023-10-11 PROCEDURE — 3075F PR MOST RECENT SYSTOLIC BLOOD PRESS GE 130-139MM HG: ICD-10-PCS | Mod: CPTII,S$GLB,, | Performed by: FAMILY MEDICINE

## 2023-10-11 PROCEDURE — 1160F PR REVIEW ALL MEDS BY PRESCRIBER/CLIN PHARMACIST DOCUMENTED: ICD-10-PCS | Mod: CPTII,S$GLB,, | Performed by: FAMILY MEDICINE

## 2023-10-11 PROCEDURE — 1160F RVW MEDS BY RX/DR IN RCRD: CPT | Mod: CPTII,S$GLB,, | Performed by: FAMILY MEDICINE

## 2023-10-11 PROCEDURE — 4010F PR ACE/ARB THEARPY RXD/TAKEN: ICD-10-PCS | Mod: CPTII,S$GLB,, | Performed by: FAMILY MEDICINE

## 2023-10-11 PROCEDURE — 4010F ACE/ARB THERAPY RXD/TAKEN: CPT | Mod: CPTII,S$GLB,, | Performed by: FAMILY MEDICINE

## 2023-10-11 PROCEDURE — 99214 OFFICE O/P EST MOD 30 MIN: CPT | Mod: S$GLB,,, | Performed by: FAMILY MEDICINE

## 2023-10-11 PROCEDURE — 99214 PR OFFICE/OUTPT VISIT, EST, LEVL IV, 30-39 MIN: ICD-10-PCS | Mod: S$GLB,,, | Performed by: FAMILY MEDICINE

## 2023-10-11 PROCEDURE — 3008F BODY MASS INDEX DOCD: CPT | Mod: CPTII,S$GLB,, | Performed by: FAMILY MEDICINE

## 2023-10-11 PROCEDURE — 3075F SYST BP GE 130 - 139MM HG: CPT | Mod: CPTII,S$GLB,, | Performed by: FAMILY MEDICINE

## 2023-10-11 PROCEDURE — 1159F PR MEDICATION LIST DOCUMENTED IN MEDICAL RECORD: ICD-10-PCS | Mod: CPTII,S$GLB,, | Performed by: FAMILY MEDICINE

## 2023-10-11 PROCEDURE — 3079F PR MOST RECENT DIASTOLIC BLOOD PRESSURE 80-89 MM HG: ICD-10-PCS | Mod: CPTII,S$GLB,, | Performed by: FAMILY MEDICINE

## 2023-10-11 RX ORDER — CARIPRAZINE 1.5 MG/1
1.5 CAPSULE, GELATIN COATED ORAL DAILY
Qty: 30 CAPSULE | Refills: 3 | Status: SHIPPED | OUTPATIENT
Start: 2023-10-11

## 2023-10-11 RX ORDER — OXYCODONE HYDROCHLORIDE 15 MG/1
15 TABLET ORAL EVERY 6 HOURS PRN
Qty: 120 TABLET | Refills: 0 | Status: SHIPPED | OUTPATIENT
Start: 2023-10-26 | End: 2023-11-27 | Stop reason: SDUPTHER

## 2023-10-11 RX ORDER — DIAZEPAM 10 MG/1
10 TABLET ORAL 3 TIMES DAILY
Qty: 90 TABLET | Refills: 1 | Status: SHIPPED | OUTPATIENT
Start: 2023-10-11 | End: 2023-11-27 | Stop reason: SDUPTHER

## 2023-10-11 RX ORDER — FUROSEMIDE 20 MG/1
20 TABLET ORAL DAILY
Qty: 30 TABLET | Refills: 5 | Status: SHIPPED | OUTPATIENT
Start: 2023-10-11

## 2023-10-11 SDOH — SOCIAL DETERMINANTS OF HEALTH (SDOH): PROBLEM RELATED TO PRIMARY SUPPORT GROUP, UNSPECIFIED: Z63.9

## 2023-10-11 NOTE — PROGRESS NOTES
SUBJECTIVE:    Patient ID: Marie Damon is a 48 y.o. female.    Chief Complaint: Follow-up (6 mo f/u//no med bottles//declined colonoscopy//declined mammogram//declined flu vac//tc)      Pt seen in order to checkup on acute and chronic conditions.      Recently treated at the [facility] for impetigo w/ bactrim. Was also given potassium bicarbonate and lactulose. States it is getting better.  Pt also had some labs last week with Dr. Wolfe for elevated potassium from 6.4-7.3.  was called in kayexalate, but pt has not taken. Still taking aldactone. States she ran out of the lasix and has no refills until 10/19/2023.     BP is ok today. Denies CP/SOB/HA/palpitations. Weight is stable.      Anxiety and depression is elevated due to issues at home. Pt upset because she may not be have a place to live as her mom may be moving into a nursing home.  Has been still taking valium.     Pt has been to see GI (Armand)since her last visit. Has not gotten her cscope done due to transportation issues.    Pt she is crying daily. Pt crying today due to feeling bad about issues at home and the way she is treated. Pt cares for her mom.   Pt living with her mom that screams at her all the time, and puts her down all the time.  (Failed risperdal- bad dreams/groggy)     Continues to have a anemia.  Has Hep B and possibly cirrhosis, now on vemlidy ([place]).  Continues to follow with  hematology (Uvaldo).     Pt continues to have chronic back pain. Takes oxycodone as needed for pain, which is not really helping at times.  Told to take alternative to tylenol due to hep B.    Has numbness or tingling in the feet,  has been falling for the last 2-3 months.  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 Pt states she stopped on her own        ----------------------------------------------------  Mammogram due, has not done.  Cscope due          Infusion on  10/03/2023   Component Date Value Ref Range Status    Sodium 10/03/2023 138  136 - 145 mmol/L Final    Potassium 10/03/2023 7.3 (HH)  3.5 - 5.1 mmol/L Final    Chloride 10/03/2023 109  95 - 110 mmol/L Final    CO2 10/03/2023 19 (L)  23 - 29 mmol/L Final    Glucose 10/03/2023 85  70 - 110 mg/dL Final    BUN 10/03/2023 32 (H)  6 - 20 mg/dL Final    Creatinine 10/03/2023 1.6 (H)  0.5 - 1.4 mg/dL Final    Calcium 10/03/2023 8.5 (L)  8.7 - 10.5 mg/dL Final    Anion Gap 10/03/2023 10  8 - 16 mmol/L Final    eGFR 10/03/2023 39.0 (A)  >60 mL/min/1.73 m^2 Final   Lab Visit on 10/03/2023   Component Date Value Ref Range Status    WBC 10/03/2023 5.43  3.90 - 12.70 K/uL Final    RBC 10/03/2023 3.25 (L)  4.00 - 5.40 M/uL Final    Hemoglobin 10/03/2023 9.3 (L)  12.0 - 16.0 g/dL Final    Hematocrit 10/03/2023 31.3 (L)  37.0 - 48.5 % Final    MCV 10/03/2023 96  82 - 98 fL Final    MCH 10/03/2023 28.6  27.0 - 31.0 pg Final    MCHC 10/03/2023 29.7 (L)  32.0 - 36.0 g/dL Final    RDW 10/03/2023 15.0 (H)  11.5 - 14.5 % Final    Platelets 10/03/2023 107 (L)  150 - 450 K/uL Final    MPV 10/03/2023 10.9  9.2 - 12.9 fL Final    Immature Granulocytes 10/03/2023 0.2  0.0 - 0.5 % Final    Gran # (ANC) 10/03/2023 3.2  1.8 - 7.7 K/uL Final    Immature Grans (Abs) 10/03/2023 0.01  0.00 - 0.04 K/uL Final    Lymph # 10/03/2023 1.3  1.0 - 4.8 K/uL Final    Mono # 10/03/2023 0.5  0.3 - 1.0 K/uL Final    Eos # 10/03/2023 0.4  0.0 - 0.5 K/uL Final    Baso # 10/03/2023 0.05  0.00 - 0.20 K/uL Final    nRBC 10/03/2023 0  0 /100 WBC Final    Gran % 10/03/2023 58.6  38.0 - 73.0 % Final    Lymph % 10/03/2023 24.3  18.0 - 48.0 % Final    Mono % 10/03/2023 8.8  4.0 - 15.0 % Final    Eosinophil % 10/03/2023 7.2  0.0 - 8.0 % Final    Basophil % 10/03/2023 0.9  0.0 - 1.9 % Final    Differential Method 10/03/2023 Automated   Final    Sodium 10/03/2023 138  136 - 145 mmol/L Final    Potassium 10/03/2023 6.4 (HH)  3.5 - 5.1 mmol/L Final    Chloride 10/03/2023 111  (H)  95 - 110 mmol/L Final    CO2 10/03/2023 19 (L)  23 - 29 mmol/L Final    Glucose 10/03/2023 93  70 - 110 mg/dL Final    BUN 10/03/2023 32 (H)  6 - 20 mg/dL Final    Creatinine 10/03/2023 1.6 (H)  0.5 - 1.4 mg/dL Final    Calcium 10/03/2023 8.6 (L)  8.7 - 10.5 mg/dL Final    Total Protein 10/03/2023 6.5  6.0 - 8.4 g/dL Final    Albumin 10/03/2023 3.4 (L)  3.5 - 5.2 g/dL Final    Total Bilirubin 10/03/2023 0.4  0.1 - 1.0 mg/dL Final    Alkaline Phosphatase 10/03/2023 103  55 - 135 U/L Final    AST 10/03/2023 23  10 - 40 U/L Final    ALT 10/03/2023 17  10 - 44 U/L Final    eGFR 10/03/2023 39.0 (A)  >60 mL/min/1.73 m^2 Final    Anion Gap 10/03/2023 8  8 - 16 mmol/L Final    Research Lab 10/03/2023 No result necessary   Final    Ferritin 10/03/2023 20  20.0 - 300.0 ng/mL Final    Iron 10/03/2023 136  30 - 160 ug/dL Final    Transferrin 10/03/2023 321  200 - 375 mg/dL Final    TIBC 10/03/2023 475 (H)  250 - 450 ug/dL Final    Saturated Iron 10/03/2023 29  20 - 50 % Final   Admission on 09/22/2023, Discharged on 09/22/2023   Component Date Value Ref Range Status    WBC 09/22/2023 3.31 (L)  3.90 - 12.70 K/uL Final    RBC 09/22/2023 3.37 (L)  4.00 - 5.40 M/uL Final    Hemoglobin 09/22/2023 9.9 (L)  12.0 - 16.0 g/dL Final    Hematocrit 09/22/2023 30.2 (L)  37.0 - 48.5 % Final    MCV 09/22/2023 90  82 - 98 fL Final    MCH 09/22/2023 29.4  27.0 - 31.0 pg Final    MCHC 09/22/2023 32.8  32.0 - 36.0 g/dL Final    RDW 09/22/2023 13.6  11.5 - 14.5 % Final    Platelets 09/22/2023 80 (L)  150 - 450 K/uL Final    MPV 09/22/2023 10.7  9.2 - 12.9 fL Final    Immature Granulocytes 09/22/2023 0.3  0.0 - 0.5 % Final    Gran # (ANC) 09/22/2023 2.0  1.8 - 7.7 K/uL Final    Immature Grans (Abs) 09/22/2023 0.01  0.00 - 0.04 K/uL Final    Lymph # 09/22/2023 1.0  1.0 - 4.8 K/uL Final    Mono # 09/22/2023 0.2 (L)  0.3 - 1.0 K/uL Final    Eos # 09/22/2023 0.1  0.0 - 0.5 K/uL Final    Baso # 09/22/2023 0.02  0.00 - 0.20 K/uL Final    nRBC  09/22/2023 0  0 /100 WBC Final    Gran % 09/22/2023 59.6  38.0 - 73.0 % Final    Lymph % 09/22/2023 29.6  18.0 - 48.0 % Final    Mono % 09/22/2023 5.7  4.0 - 15.0 % Final    Eosinophil % 09/22/2023 4.2  0.0 - 8.0 % Final    Basophil % 09/22/2023 0.6  0.0 - 1.9 % Final    Differential Method 09/22/2023 Automated   Final    Sodium 09/22/2023 138  136 - 145 mmol/L Final    Potassium 09/22/2023 3.0 (L)  3.5 - 5.1 mmol/L Final    Chloride 09/22/2023 100  95 - 110 mmol/L Final    CO2 09/22/2023 27  22 - 31 mmol/L Final    Glucose 09/22/2023 141 (H)  70 - 110 mg/dL Final    BUN 09/22/2023 29 (H)  7 - 18 mg/dL Final    Creatinine 09/22/2023 1.15  0.50 - 1.40 mg/dL Final    Calcium 09/22/2023 9.3  8.4 - 10.2 mg/dL Final    Total Protein 09/22/2023 7.4  6.0 - 8.4 g/dL Final    Albumin 09/22/2023 4.1  3.5 - 5.2 g/dL Final    Total Bilirubin 09/22/2023 0.5  0.2 - 1.3 mg/dL Final    Alkaline Phosphatase 09/22/2023 164 (H)  38 - 145 U/L Final    AST 09/22/2023 33  14 - 36 U/L Final    ALT 09/22/2023 25  0 - 35 U/L Final    Anion Gap 09/22/2023 11  5 - 12 mmol/L Final    eGFR 09/22/2023 58 (A)  >60 mL/min/1.73 m^2 Final    Magnesium 09/22/2023 1.8  1.6 - 2.6 mg/dL Final    TSH 09/22/2023 2.570  0.400 - 4.000 uIU/mL Final    Salicylate Lvl 09/22/2023 <1  0 - 19 mg/dL Final    Acetaminophen (Tylenol), Serum 09/22/2023 <10.0  10.0 - 20.0 ug/mL Final    Alcohol, Serum 09/22/2023 <10  <10 mg/dL Final    PT 09/22/2023 14.6  11.8 - 14.7 sec Final    INR 09/22/2023 1.1   Final    Ammonia 09/22/2023 40 (H)  9 - 30 umol/L Final    Specimen UA 09/22/2023 Urine, Clean Catch   Final    Color, UA 09/22/2023 Yellow  Yellow, Straw, Allyson Final    Appearance, UA 09/22/2023 Clear  Clear Final    pH, UA 09/22/2023 6.0  5.0 - 8.0 Final    Specific Gravity, UA 09/22/2023 1.010  1.005 - 1.030 Final    Protein, UA 09/22/2023 Negative  Negative Final    Glucose, UA 09/22/2023 Negative  Negative Final    Ketones, UA 09/22/2023 Negative  Negative Final     Bilirubin (UA) 09/22/2023 Negative  Negative Final    Occult Blood UA 09/22/2023 Negative  Negative Final    Nitrite, UA 09/22/2023 Negative  Negative Final    Urobilinogen, UA 09/22/2023 0.2  <2.0 EU/dL Final    Leukocytes, UA 09/22/2023 Negative  Negative Final    Amphetamine Screen, Ur 09/22/2023 Presumptive Positive (A)  Negative Final    Barbiturate Screen, Ur 09/22/2023 Negative  Negative Final    Benzodiazepines 09/22/2023 Presumptive Positive (A)  Negative Final    Cocaine (Metab.) 09/22/2023 Negative  Negative Final    Opiate Scrn, Ur 09/22/2023 Negative  Negative Final    Phencyclidine 09/22/2023 Negative  Negative Final    THC 09/22/2023 Negative  Negative Final    Tricyclic Antidepressants (TCA), U* 09/22/2023 Negative  Negative Final    Creatinine, Urine 09/22/2023 47.9  15.0 - 325.0 mg/dL Final    Toxicology Information 09/22/2023 SEE COMMENT   Final   Lab Visit on 04/28/2023   Component Date Value Ref Range Status    WBC 04/28/2023 2.88 (L)  3.90 - 12.70 K/uL Final    RBC 04/28/2023 3.75 (L)  4.00 - 5.40 M/uL Final    Hemoglobin 04/28/2023 10.7 (L)  12.0 - 16.0 g/dL Final    Hematocrit 04/28/2023 32.8 (L)  37.0 - 48.5 % Final    MCV 04/28/2023 88  82 - 98 fL Final    MCH 04/28/2023 28.5  27.0 - 31.0 pg Final    MCHC 04/28/2023 32.6  32.0 - 36.0 g/dL Final    RDW 04/28/2023 14.7 (H)  11.5 - 14.5 % Final    Platelets 04/28/2023 78 (L)  150 - 450 K/uL Final    MPV 04/28/2023 10.1  9.2 - 12.9 fL Final    AFP 04/28/2023 3.1  0.0 - 8.4 ng/mL Final    Hepatitis B Surface Ag 04/28/2023 Reactive (A)  Non-reactive Final    Hep B Viral DNA IU/ML 04/28/2023 <10  <10 IU/mL Final    Log HBV IU/mL 04/28/2023 <1.00  <1.00 Log (10) IU/mL Final    Hepatitis B Virus DNA 04/28/2023 DETECTED (A)  Not detected Final    Hepatitis C Ab 04/28/2023 Non-reactive  Non-reactive Final    Delta Ab Total Quantative 04/28/2023 Negative  Negative Final    Sodium 04/28/2023 139  136 - 145 mmol/L Final    Potassium 04/28/2023 4.3  3.5 -  5.1 mmol/L Final    Chloride 04/28/2023 102  95 - 110 mmol/L Final    CO2 04/28/2023 27  23 - 29 mmol/L Final    Glucose 04/28/2023 94  70 - 110 mg/dL Final    BUN 04/28/2023 31 (H)  6 - 20 mg/dL Final    Creatinine 04/28/2023 1.3  0.5 - 1.4 mg/dL Final    Calcium 04/28/2023 9.4  8.7 - 10.5 mg/dL Final    Total Protein 04/28/2023 7.8  6.0 - 8.4 g/dL Final    Albumin 04/28/2023 3.8  3.5 - 5.2 g/dL Final    Total Bilirubin 04/28/2023 1.0  0.1 - 1.0 mg/dL Final    Alkaline Phosphatase 04/28/2023 126  55 - 135 U/L Final    AST 04/28/2023 60 (H)  10 - 40 U/L Final    ALT 04/28/2023 40  10 - 44 U/L Final    Anion Gap 04/28/2023 10  8 - 16 mmol/L Final    eGFR 04/28/2023 50.4 (A)  >60 mL/min/1.73 m^2 Final    HBsAg Confirmation 04/28/2023 Confirmed Positive (A)  Not confirmed Final       Past Medical History:   Diagnosis Date    Bone spur     hip    HTN (hypertension)      Social History     Socioeconomic History    Marital status:    Tobacco Use    Smoking status: Every Day     Current packs/day: 1.00     Types: Cigarettes     Passive exposure: Current    Smokeless tobacco: Never   Substance and Sexual Activity    Alcohol use: Not Currently    Drug use: Not Currently     Past Surgical History:   Procedure Laterality Date    CHOLECYSTECTOMY      HYSTERECTOMY       History reviewed. No pertinent family history.    Review of patient's allergies indicates:   Allergen Reactions    Pcn [penicillins] Hives    Morphine Other (See Comments)     heartburn    Bupropion hcl Other (See Comments)    Demerol [meperidine]        Current Outpatient Medications:     bisoprolol (ZEBETA) 5 MG tablet, Take 1 tablet (5 mg total) by mouth once daily., Disp: 30 tablet, Rfl: 2    dicyclomine (BENTYL) 10 MG capsule, TAKE 1 CASPULE BY MOUTH 4 TIMES A DAY BEFORE MEALS AND NIGHTLY, Disp: , Rfl:     EScitalopram oxalate (LEXAPRO) 20 MG tablet, Take 1 tablet (20 mg total) by mouth once daily., Disp: 30 tablet, Rfl: 11    gabapentin (NEURONTIN)  300 MG capsule, Take 1 capsule (300 mg total) by mouth 2 (two) times daily., Disp: 60 capsule, Rfl: 2    lisinopriL 10 MG tablet, Take 1 tablet (10 mg total) by mouth once daily., Disp: 90 tablet, Rfl: 1    ondansetron (ZOFRAN-ODT) 4 MG TbDL, Take 1 tablet (4 mg total) by mouth every 8 (eight) hours as needed (nausea)., Disp: 30 tablet, Rfl: 0    senna (SENOKOT) 8.6 mg tablet, Take 1 tablet by mouth daily as needed for Constipation., Disp: 30 tablet, Rfl: 2    spironolactone (ALDACTONE) 100 MG tablet, Take 1 tablet (100 mg total) by mouth once daily., Disp: 30 tablet, Rfl: 5    cariprazine (VRAYLAR) 1.5 mg Cap, Take 1 capsule (1.5 mg total) by mouth Daily., Disp: 30 capsule, Rfl: 3    diazePAM (VALIUM) 10 MG Tab, Take 1 tablet (10 mg total) by mouth 3 (three) times daily., Disp: 90 tablet, Rfl: 1    furosemide (LASIX) 20 MG tablet, Take 1 tablet (20 mg total) by mouth once daily., Disp: 30 tablet, Rfl: 5    GAVILYTE-G 236-22.74-6.74 -5.86 gram suspension, Take by mouth., Disp: , Rfl:     naloxone (NARCAN) 4 mg/actuation Spry, each nostril if not breathing or not responsive, Disp: 1 each, Rfl: 2    [START ON 10/26/2023] oxyCODONE (ROXICODONE) 15 MG Tab, Take 1 tablet (15 mg total) by mouth every 6 (six) hours as needed for Pain., Disp: 120 tablet, Rfl: 0    sodium polystyrene (KAYEXALATE) powder, Take 15 grams by mouth today and repeat dose tomorrow (Patient not taking: Reported on 10/11/2023), Disp: 30 g, Rfl: 0    tenofovir alafenamide (VEMLIDY) 25 mg Tab, Take 1 tablet (25 mg total) by mouth once daily., Disp: 30 tablet, Rfl: 11    Review of Systems   Constitutional:  Negative for appetite change, fatigue, fever and unexpected weight change.   Respiratory:  Negative for cough, chest tightness, shortness of breath and wheezing.    Cardiovascular:  Negative for chest pain and leg swelling.   Gastrointestinal:  Negative for abdominal pain, constipation, nausea and vomiting.        -heartburn, bloating  "  Genitourinary:  Negative for difficulty urinating, dysuria, frequency and urgency.   Musculoskeletal:  Positive for arthralgias and back pain. Negative for myalgias and neck pain.   Skin:  Positive for rash.   Neurological:  Negative for dizziness, weakness, numbness and headaches.   Hematological:  Does not bruise/bleed easily.   Psychiatric/Behavioral:  Positive for dysphoric mood and sleep disturbance. Negative for suicidal ideas. The patient is nervous/anxious.    All other systems reviewed and are negative.         Objective:      Vitals:    10/11/23 1311   BP: 134/86   Pulse: 72   Weight: 89.4 kg (197 lb)   Height: (P) 5' 11" (1.803 m)         Wt Readings from Last 6 Encounters:   10/11/23 89.4 kg (197 lb)   10/03/23 99.6 kg (219 lb 9.3 oz)   10/03/23 99.6 kg (219 lb 9.3 oz)   09/22/23 88.9 kg (195 lb 15.8 oz)   05/15/23 88.9 kg (196 lb)   04/28/23 92.4 kg (203 lb 11.3 oz)         Physical Exam  Vitals reviewed.   Constitutional:       General: She is not in acute distress.     Appearance: Normal appearance. She is well-developed.      Comments: overweight   HENT:      Head: Normocephalic and atraumatic.   Neck:      Thyroid: No thyromegaly.   Cardiovascular:      Rate and Rhythm: Normal rate and regular rhythm.      Heart sounds: No murmur heard.     No friction rub.      Comments: Bilateral lower extremity varicose veins  Pulmonary:      Effort: Pulmonary effort is normal.      Breath sounds: Normal breath sounds. No wheezing or rales.   Abdominal:      General: There is no distension.      Palpations: Abdomen is soft.      Tenderness: There is no abdominal tenderness. There is no guarding or rebound.   Musculoskeletal:      Cervical back: Neck supple.      Right lower leg: Edema (trace) present.      Left lower leg: Edema (trace) present.   Lymphadenopathy:      Cervical: No cervical adenopathy.   Skin:     General: Skin is warm and dry.      Findings: Rash (lower extermity, back of left leg) present. "   Neurological:      Mental Status: She is alert and oriented to person, place, and time.   Psychiatric:         Attention and Perception: Attention normal. She does not perceive auditory hallucinations.         Mood and Affect: Mood is depressed. Affect is tearful.         Speech: Speech normal.         Behavior: Behavior is cooperative.         Thought Content: Thought content normal. Thought content is not paranoid. Thought content does not include homicidal or suicidal ideation.         Cognition and Memory: Cognition is not impaired.         Judgment: Judgment normal.           Assessment:       1. Essential hypertension    2. Cirrhosis of liver without ascites, unspecified hepatic cirrhosis type    3. Chronic pain syndrome    4. Anxiety    5. Hyperkalemia    6. Family problems    7. Bipolar affective disorder, currently depressed, moderate    8. Long-term use of high-risk medication               Plan:       Essential hypertension  Comments:  Controlled. Will continue to monitor BP on current medication regimen    Cirrhosis of liver without ascites, unspecified hepatic cirrhosis type  Comments:  Stable. Will refill lasix. Has aldactone. Will continue to monitor symptoms  Orders:  -     furosemide (LASIX) 20 MG tablet; Take 1 tablet (20 mg total) by mouth once daily.  Dispense: 30 tablet; Refill: 5    Chronic pain syndrome  Comments:  Pain controlled. Will continue to monitor the pain control and function  Orders:  -     oxyCODONE (ROXICODONE) 15 MG Tab; Take 1 tablet (15 mg total) by mouth every 6 (six) hours as needed for Pain.  Dispense: 120 tablet; Refill: 0    Anxiety  Comments:  Symptomatic.  Will continue to monitor on Valium. To follow with psyche soon  Orders:  -     diazePAM (VALIUM) 10 MG Tab; Take 1 tablet (10 mg total) by mouth 3 (three) times daily.  Dispense: 90 tablet; Refill: 1    Hyperkalemia  Comments:  Pt to repeat potassium, and will call kayexalate.   Orders:  -     Basic Metabolic Panel;  Future; Expected date: 10/11/2023  -     Magnesium; Future; Expected date: 10/11/2023    Family problems  Comments:  Persistent. Provided reassurance.    Bipolar affective disorder, currently depressed, moderate  Comments:  Symptomatic. Will try on vraylar.  Will continue to monitor symptoms  Orders:  -     cariprazine (VRAYLAR) 1.5 mg Cap; Take 1 capsule (1.5 mg total) by mouth Daily.  Dispense: 30 capsule; Refill: 3    Long-term use of high-risk medication  -     Basic Metabolic Panel; Future; Expected date: 10/11/2023  -     Magnesium; Future; Expected date: 10/11/2023          Labs and/or tests have been ordered for the evaluation/monitoring of acute/chronic conditions, to be done just before next visit.    Chronic Pain Notes:  Have discussed the risks and benefits of chronic narcotic therapy including pain control, dependence, and addiction.  The patient is aware and accepts the risks and benefits. Patient is aware of the risk of taking narcotics combined with benzodiazepines/muscle relaxers/hypnotics, including but not limited to breathing difficulties, coma, and death; accepts the risks; and agrees to use medications only as prescribed.    Follow up in about 3 months (around 1/11/2024).        10/11/2023 Marleen Sun

## 2023-10-12 ENCOUNTER — TELEPHONE (OUTPATIENT)
Dept: FAMILY MEDICINE | Facility: CLINIC | Age: 48
End: 2023-10-12

## 2023-10-12 LAB
BUN SERPL-MCNC: 14 MG/DL (ref 7–25)
BUN/CREAT SERPL: ABNORMAL (CALC) (ref 6–22)
CALCIUM SERPL-MCNC: 9.5 MG/DL (ref 8.6–10.4)
CHLORIDE SERPL-SCNC: 104 MMOL/L (ref 98–110)
CO2 SERPL-SCNC: 28 MMOL/L (ref 20–32)
CREAT SERPL-MCNC: 0.98 MG/DL (ref 0.5–1.03)
EGFR: 70 ML/MIN/1.73M2
GLUCOSE SERPL-MCNC: 103 MG/DL (ref 65–99)
MAGNESIUM SERPL-MCNC: 1.7 MG/DL (ref 1.5–2.5)
POTASSIUM SERPL-SCNC: 4.4 MMOL/L (ref 3.5–5.3)
SODIUM SERPL-SCNC: 141 MMOL/L (ref 135–146)

## 2023-10-12 NOTE — TELEPHONE ENCOUNTER
----- Message from Faby Del Cid sent at 10/12/2023 11:17 AM CDT -----  Pt was seen yesterday and her potassium was high. We re did blood work yesterday and she would like to know the results   [phone]

## 2023-10-13 ENCOUNTER — TELEPHONE (OUTPATIENT)
Dept: FAMILY MEDICINE | Facility: CLINIC | Age: 48
End: 2023-10-13

## 2023-10-13 NOTE — TELEPHONE ENCOUNTER
Spoke with Pt. Pt states she just woke up, but she was unable to get up. She kept dozing off. States she could not move her left arm or fingers, but now she she is feeling better. States at first she thought she had a stroke. Advised pt she should go to the ER. Pt state she is feeling better now. Arm no longer numb. States she slept on her arm wrong. Pt declines going to Er. States she should not have called. She just slept to hard.... advised of symptoms cont go to the ER. She voiced understanding. TERESA

## 2023-10-13 NOTE — TELEPHONE ENCOUNTER
----- Message from Faby Del Cid sent at 10/13/2023 11:15 AM CDT -----  Pt fell asleep sitting up and when she woke up she can barely move. Her right leg and into her back are hurting. Her arm is cold and she can not move it. Would like to know what to do   [phone]

## 2023-10-16 ENCOUNTER — TELEPHONE (OUTPATIENT)
Dept: FAMILY MEDICINE | Facility: CLINIC | Age: 48
End: 2023-10-16

## 2023-10-16 NOTE — LETTER
[address] [facility] [address]  [place] [address]  Phone: [phone]   [phone]                        MD Marleen Pino MD Chequita Williams, MD Matthew Bassett, PA-C Allison Hoffritz, JOSE LUIS Madsen, JOSE LUIS Mobley, JOSE LUIS      Date: 10/16/2023        Patient: Marie Damon  YOB: 1975      Please fax discharge summary.         Sincerely,     Carmen Nielsen LPN    Electronically Signed By: Marleen Sun MD

## 2023-10-16 NOTE — TELEPHONE ENCOUNTER
----- Message from Faby Del Cid sent at 10/16/2023 11:22 AM CDT -----  Pt went to [facility] in [place] for no feeling in her hand. They wanted to move her to [facility] to see a neurologist and she wasn't sure if she would have a ride home so she did not go. She has a bulging disk in her neck.   [phone]

## 2023-10-16 NOTE — TELEPHONE ENCOUNTER
Spoke with Mrs. Tavera. She mailed a medical request for pt trying to get records all the way back to 2020. Advised we would not receive that. It would go to medical records. Gave contact info.

## 2023-10-16 NOTE — TELEPHONE ENCOUNTER
----- Message from Marlena Luu sent at 10/16/2023  2:44 PM CDT -----  VM-Mrs. Tavera from the disability office called at 2:20pm and said she was calling to f/u about some medical records that they sent the request for through the mail on 09/19/23 and they were due on the 13th but if you have any questions about it just let her know.  [phone]

## 2023-10-16 NOTE — TELEPHONE ENCOUNTER
Pt went to the ER at [facility]. Records requested. Pt states she has a bulging disc that is causing her not able to use her hand. They wanted her to see a neurologist but she didn't know if she would have a ride. Pt is calling the hospital to see which provider they recommended. And will call back for a referral. Requested hospital records.

## 2023-10-23 DIAGNOSIS — K74.60 CIRRHOSIS OF LIVER WITHOUT ASCITES, UNSPECIFIED HEPATIC CIRRHOSIS TYPE: ICD-10-CM

## 2023-10-23 DIAGNOSIS — R11.0 NAUSEA: Primary | ICD-10-CM

## 2023-10-23 RX ORDER — ONDANSETRON 4 MG/1
4 TABLET, ORALLY DISINTEGRATING ORAL EVERY 6 HOURS PRN
Qty: 20 TABLET | Refills: 0 | Status: SHIPPED | OUTPATIENT
Start: 2023-10-23 | End: 2024-02-26 | Stop reason: SDUPTHER

## 2023-10-23 NOTE — TELEPHONE ENCOUNTER
----- Message from Faby Del Cid sent at 10/23/2023  9:44 AM CDT -----  Pt is returning dawit's call   [phone]

## 2023-10-23 NOTE — TELEPHONE ENCOUNTER
----- Message from Tatum Garcia sent at 10/23/2023  9:21 AM CDT -----  The patient needs something for nausea called in. For [facility]. She has been to several walk in clinics. Then she went to [facility] ER. Her B/P was so low. They kept her. She had blood in her stool. Please call pt's # [phone] GH

## 2023-10-23 NOTE — TELEPHONE ENCOUNTER
The patient's prescription has been approved and sent to   [facility] [address]  [address] [facility] [address]  Phone: [phone] Fax: [phone]

## 2023-10-23 NOTE — LETTER
[address] [facility] [address]  [place] [address]  Phone: [phone]   [phone]                        MD Marleen Pino MD Chequita Williams, MD Matthew Bassett, PA-C Allison Hoffritz, JOSE LUIS Islas, JOSE LUIS      Date: 10/23/2023        Patient: Marie Damon  YOB: 1975      Please fax discharge summary.         Sincerely,     Carmen Nielsen LPN    Electronically Signed By: Marleen Sun MD

## 2023-10-23 NOTE — TELEPHONE ENCOUNTER
Pt states she needs nausea medications sent to [facility]. She also wanted to thank Dr. Sun for filling her rx.   Pts states on 10/12/23 Thursday she woke up with no feeling on right leg and could not move her right hand . She went to the ER and was advised she has a bulging disc.   [facility] advised her to go to [facility]. Pt refused and signed out AMA from [facility].   Since then she tried to go back to [facility], but could not go due to hypotension.   She went to [facility] and was admitted on 10/16/23. Pt was dx GI bleed and has follow up appt with GI on 11/1/23 and meeting with hematologist. On 10/31/23. States she was discharged on 11/18/23. HFU scheduled. Records requested.     Pt still requesting nausea medication.

## 2023-10-24 ENCOUNTER — TELEPHONE (OUTPATIENT)
Dept: FAMILY MEDICINE | Facility: CLINIC | Age: 48
End: 2023-10-24

## 2023-10-24 NOTE — TELEPHONE ENCOUNTER
Spoke with pt and informed her that she has a current order for a mammo and it will be [facility]. Pt voiced understanding.

## 2023-10-24 NOTE — TELEPHONE ENCOUNTER
----- Message from Tatum Garcia sent at 10/24/2023  9:36 AM CDT -----  The patient needs an order for a routine mammogram. [facility] [address] in [place]. Can you send this ASAP. She is trying  to get in on Thursday. pt's # [phone]

## 2023-10-25 ENCOUNTER — TELEPHONE (OUTPATIENT)
Dept: FAMILY MEDICINE | Facility: CLINIC | Age: 48
End: 2023-10-25

## 2023-10-25 NOTE — TELEPHONE ENCOUNTER
----- Message from Tatum Garcia sent at 10/25/2023 10:26 AM CDT -----  The patient said someone called her and said we had a cancellation today with Dr. Sun at 2:20. This will be for her HFU. She said she LMOR and said she would be here. I saw the spot open so I put her in the spot. I tried to buzz Carmen and Yaz.   GH

## 2023-10-25 NOTE — TELEPHONE ENCOUNTER
----- Message from Tatum Garcia sent at 10/25/2023  2:26 PM CDT -----  The patient had to cancel her HFU today. The Medicaid  transportation  did not show up. Can you get her rescheduled for her HFU. I cancelled her appointment because it was not her fault. Pt's # [phone]

## 2023-10-27 ENCOUNTER — TELEPHONE (OUTPATIENT)
Dept: FAMILY MEDICINE | Facility: CLINIC | Age: 48
End: 2023-10-27

## 2023-10-27 DIAGNOSIS — R92.8 ABNORMAL MAMMOGRAM: Primary | ICD-10-CM

## 2023-10-27 RX ORDER — DICYCLOMINE HYDROCHLORIDE 20 MG/1
20 TABLET ORAL 4 TIMES DAILY
Qty: 120 TABLET | Refills: 3 | Status: SHIPPED | OUTPATIENT
Start: 2023-10-27 | End: 2024-02-24

## 2023-10-27 NOTE — TELEPHONE ENCOUNTER
Spoke to pt verbatim per Dr. Sun . Pt voiced  understanding.     States she has appt with GI on 11/1/23

## 2023-10-27 NOTE — TELEPHONE ENCOUNTER
Per dr. Sun we can send new dose in, dicyclomine 20mg QID. Follow up with GI    Pt mother answered the phone. Advised she is not on communication consent. She will have pt call back.

## 2023-10-27 NOTE — TELEPHONE ENCOUNTER
This prescription has been approved and sent to   [facility] - [place], [place] - [address]  [address] [facility] [address]  Phone: [phone] Fax: [phone]

## 2023-10-27 NOTE — TELEPHONE ENCOUNTER
----- Message from Faby Del Cid sent at 10/27/2023 10:02 AM CDT -----  Pt needs refill dicyclomine 10 mg. She is taking them 3 before her meal and not 1.   [facility] pharmacy   [phone]

## 2023-10-27 NOTE — TELEPHONE ENCOUNTER
----- Message from Marlena Luu sent at 10/27/2023 10:34 AM CDT -----  GERA- Jessica from [facility] called at 10:26am and said this pt results came back from her mammogram and she needs to come back and have a right breast ultrasound and she was calling to get orders faxed over to [phone](Jessica-phone)

## 2023-10-30 NOTE — PROGRESS NOTES
PATIENT: Marie Damon  MRN: [id number]  DATE: 10/31/2023      Diagnosis:   1. Psychosis, unspecified psychosis type    2. Iron deficiency anemia due to chronic blood loss    3. Thrombocytopenia due to diminished platelet production        Chief Complaint: Anemia      Oncologic History:      Oncologic History     Oncologic Treatment     Pathology           Subjective:    Initial History: Ms. Damon is a 48 y.o. female with HTN, Hepatitis B, thrombocytopenia, bipolar disorder who presents for iron deficiency anemia.  Pt previously diagnosed with hepatitis B and is currently on vemlidy.  Pt has recently in the hospital for GI bleed. Labs on 10/03/23 showed a ferritin of 20ng/mL with TIBC elevated at 475mcg/dL.  Pt states she is currently having difficulty walking at home and has fallen frequently.  The patient also states she sees things floating in her spittle and has been collecting samples with napkins.  She states she follows with a psychiatrist.  She endorses chronic abdominal pain.  The patient denies CP, cough, SOB, nausea, vomiting, constipation, diarrhea.  The patient denies fever, chills, night sweats, weight loss, new lumps or bumps, easy bruising or bleeding.    Past Medical History:   Past Medical History:   Diagnosis Date    Bone spur     hip    HTN (hypertension)        Past Surgical HIstory:   Past Surgical History:   Procedure Laterality Date    CHOLECYSTECTOMY      HYSTERECTOMY         Family History: No family history on file.    Social History:  reports that she has been smoking cigarettes. She has been exposed to tobacco smoke. She has never used smokeless tobacco. She reports that she does not currently use alcohol. She reports that she does not currently use drugs.    Allergies:  Review of patient's allergies indicates:   Allergen Reactions    Pcn [penicillins] Hives    Morphine Other (See Comments)     heartburn    Bupropion hcl Other (See Comments)    Demerol [meperidine]        Medications:  No  current facility-administered medications for this visit.     Current Outpatient Medications   Medication Sig Dispense Refill    bisoprolol (ZEBETA) 5 MG tablet Take 1 tablet (5 mg total) by mouth once daily. 30 tablet 2    cariprazine (VRAYLAR) 1.5 mg Cap Take 1 capsule (1.5 mg total) by mouth Daily. 30 capsule 3    diazePAM (VALIUM) 10 MG Tab Take 1 tablet (10 mg total) by mouth 3 (three) times daily. 90 tablet 1    dicyclomine (BENTYL) 20 mg tablet Take 1 tablet (20 mg total) by mouth 4 (four) times daily. 120 tablet 3    EScitalopram oxalate (LEXAPRO) 20 MG tablet Take 1 tablet (20 mg total) by mouth once daily. 30 tablet 11    furosemide (LASIX) 20 MG tablet Take 1 tablet (20 mg total) by mouth once daily. 30 tablet 5    gabapentin (NEURONTIN) 300 MG capsule Take 1 capsule (300 mg total) by mouth 2 (two) times daily. 60 capsule 2    lisinopriL 10 MG tablet Take 1 tablet (10 mg total) by mouth once daily. 90 tablet 1    naloxone (NARCAN) 4 mg/actuation Spry each nostril if not breathing or not responsive 1 each 2    nicotine (NICODERM CQ) 21 mg/24 hr 1 patch.      ondansetron (ZOFRAN-ODT) 4 MG TbDL Take 1 tablet (4 mg total) by mouth every 6 (six) hours as needed (nausea). 20 tablet 0    oxyCODONE (ROXICODONE) 15 MG Tab Take 1 tablet (15 mg total) by mouth every 6 (six) hours as needed for Pain. 120 tablet 0    senna (SENOKOT) 8.6 mg tablet Take 1 tablet by mouth daily as needed for Constipation. 30 tablet 2    spironolactone (ALDACTONE) 100 MG tablet Take 1 tablet (100 mg total) by mouth once daily. 30 tablet 5    tenofovir alafenamide (VEMLIDY) 25 mg Tab Take 1 tablet (25 mg total) by mouth once daily. 30 tablet 11    GAVILYTE-G 236-22.74-6.74 -5.86 gram suspension Take by mouth.      naloxone (NARCAN) 4 mg/actuation Spry 1 spray (4 mg total) by Nasal route once. for 1 dose 1 each 0    pantoprazole (PROTONIX) 40 MG tablet Take 40 mg by mouth.      sodium polystyrene (KAYEXALATE) powder Take 15 grams by mouth  "today and repeat dose tomorrow (Patient not taking: Reported on 10/11/2023) 30 g 0       Review of Systems   Constitutional:  Negative for chills, fatigue, fever and unexpected weight change.   HENT:          Tissue floating in spittle   Respiratory:  Negative for cough and shortness of breath.    Cardiovascular:  Negative for chest pain and palpitations.   Gastrointestinal:  Positive for abdominal pain. Negative for constipation, diarrhea, nausea and vomiting.   Skin:  Negative for rash.   Neurological:  Negative for headaches.   Hematological:  Negative for adenopathy. Does not bruise/bleed easily.   Psychiatric/Behavioral:  Positive for hallucinations. Negative for suicidal ideas.         Denies homicidal ideation       ECOG Performance Status: 2   Objective:      Vitals:   Vitals:    10/31/23 0926   BP: (!) 80/58   BP Location: Right arm   Patient Position: Sitting   BP Method: Medium (Manual)   Pulse: (!) 58   Resp: 10   Temp: 96.5 °F (35.8 °C)   TempSrc: Temporal   SpO2: 100%   Weight: 87.4 kg (192 lb 10.9 oz)   Height: 5' 11" (1.803 m)     Physical Exam  Constitutional:       General: She is not in acute distress.     Appearance: She is well-developed. She is not diaphoretic.   HENT:      Head: Normocephalic and atraumatic.   Cardiovascular:      Rate and Rhythm: Normal rate and regular rhythm.      Heart sounds: Normal heart sounds. No murmur heard.     No friction rub. No gallop.   Pulmonary:      Effort: Pulmonary effort is normal. No respiratory distress.      Breath sounds: Normal breath sounds. No wheezing or rales.   Chest:      Chest wall: No tenderness.   Abdominal:      General: Bowel sounds are normal. There is no distension.      Palpations: Abdomen is soft. There is no mass.      Tenderness: There is no abdominal tenderness. There is no guarding or rebound.   Lymphadenopathy:      Cervical: No cervical adenopathy.      Upper Body:      Right upper body: No supraclavicular adenopathy.      Left " upper body: No supraclavicular adenopathy.   Skin:     Comments: Pt with multiple areas of excoriation on skin including right ear, chin, and arms   Neurological:      Mental Status: She is alert and oriented to person, place, and time.   Psychiatric:         Attention and Perception: She is inattentive.         Mood and Affect: Affect is labile and inappropriate.         Speech: She is communicative. Speech is slurred and tangential. Speech is not rapid and pressured.       Laboratory Data:  Admission on 10/31/2023   Component Date Value Ref Range Status    WBC 10/31/2023 4.35  3.90 - 12.70 K/uL Final    RBC 10/31/2023 3.29 (L)  4.00 - 5.40 M/uL Final    Hemoglobin 10/31/2023 9.3 (L)  12.0 - 16.0 g/dL Final    Hematocrit 10/31/2023 29.0 (L)  37.0 - 48.5 % Final    MCV 10/31/2023 88  82 - 98 fL Final    MCH 10/31/2023 28.3  27.0 - 31.0 pg Final    MCHC 10/31/2023 32.1  32.0 - 36.0 g/dL Final    RDW 10/31/2023 14.7 (H)  11.5 - 14.5 % Final    Platelets 10/31/2023 96 (L)  150 - 450 K/uL Final    MPV 10/31/2023 10.1  9.2 - 12.9 fL Final    Immature Granulocytes 10/31/2023 0.2  0.0 - 0.5 % Final    Gran # (ANC) 10/31/2023 2.7  1.8 - 7.7 K/uL Final    Immature Grans (Abs) 10/31/2023 0.01  0.00 - 0.04 K/uL Final    Comment: Mild elevation in immature granulocytes is non specific and   can be seen in a variety of conditions including stress response,   acute inflammation, trauma and pregnancy. Correlation with other   laboratory and clinical findings is essential.      Lymph # 10/31/2023 1.1  1.0 - 4.8 K/uL Final    Mono # 10/31/2023 0.3  0.3 - 1.0 K/uL Final    Eos # 10/31/2023 0.2  0.0 - 0.5 K/uL Final    Baso # 10/31/2023 0.04  0.00 - 0.20 K/uL Final    nRBC 10/31/2023 0  0 /100 WBC Final    Gran % 10/31/2023 61.0  38.0 - 73.0 % Final    Lymph % 10/31/2023 26.0  18.0 - 48.0 % Final    Mono % 10/31/2023 7.1  4.0 - 15.0 % Final    Eosinophil % 10/31/2023 4.8  0.0 - 8.0 % Final    Basophil % 10/31/2023 0.9  0.0 - 1.9 %  Final    Differential Method 10/31/2023 Automated   Final    Sodium 10/31/2023 140  136 - 145 mmol/L Final    Potassium 10/31/2023 3.6  3.5 - 5.1 mmol/L Final    Anion Gap reference range revised on 4/28/2023    Chloride 10/31/2023 102  95 - 110 mmol/L Final    CO2 10/31/2023 27  22 - 31 mmol/L Final    Glucose 10/31/2023 90  70 - 110 mg/dL Final    Comment: The ADA recommends the following guidelines for fasting glucose:    Normal:       less than 100 mg/dL    Prediabetes:  100 mg/dL to 125 mg/dL    Diabetes:     126 mg/dL or higher      BUN 10/31/2023 35 (H)  7 - 18 mg/dL Final    Creatinine 10/31/2023 1.88 (H)  0.50 - 1.40 mg/dL Final    Calcium 10/31/2023 8.7  8.4 - 10.2 mg/dL Final    Total Protein 10/31/2023 7.2  6.0 - 8.4 g/dL Final    Albumin 10/31/2023 3.9  3.5 - 5.2 g/dL Final    Total Bilirubin 10/31/2023 0.8  0.2 - 1.3 mg/dL Final    Alkaline Phosphatase 10/31/2023 105  38 - 145 U/L Final    AST 10/31/2023 73 (H)  14 - 36 U/L Final    ALT 10/31/2023 35  0 - 35 U/L Final    Anion Gap 10/31/2023 11  5 - 12 mmol/L Final    Anion Gap reference range revised on 4/28/2023    eGFR 10/31/2023 32 (A)  >60 mL/min/1.73 m^2 Final    Specimen UA 10/31/2023 Urine, Clean Catch   Final    Color, UA 10/31/2023 Yellow  Yellow, Straw, Allyson Final    Appearance, UA 10/31/2023 Clear  Clear Final    pH, UA 10/31/2023 5.5  5.0 - 8.0 Final    Specific Gravity, UA 10/31/2023 1.015  1.005 - 1.030 Final    Protein, UA 10/31/2023 Negative  Negative Final    Comment: Recommend a 24 hour urine protein or a urine   protein/creatinine ratio if globulin induced proteinuria is  clinically suspected.      Glucose, UA 10/31/2023 Negative  Negative Final    Ketones, UA 10/31/2023 Negative  Negative Final    Bilirubin (UA) 10/31/2023 Negative  Negative Final    Occult Blood UA 10/31/2023 Negative  Negative Final    Nitrite, UA 10/31/2023 Negative  Negative Final    Urobilinogen, UA 10/31/2023 0.2  <2.0 EU/dL Final    Leukocytes, UA  10/31/2023 Negative  Negative Final    Amphetamine Screen, Ur 10/31/2023 Presumptive Positive (A)  Negative Final    Barbiturate Screen, Ur 10/31/2023 Negative  Negative Final    Benzodiazepines 10/31/2023 Presumptive Positive (A)  Negative Final    Cocaine (Metab.) 10/31/2023 Negative  Negative Final    Opiate Scrn, Ur 10/31/2023 Presumptive Positive (A)  Negative Final    Phencyclidine 10/31/2023 Negative  Negative Final    THC 10/31/2023 Negative  Negative Final    Tricyclic Antidepressants (TCA), U* 10/31/2023 Negative  Negative Final    Creatinine, Urine 10/31/2023 154.2  15.0 - 325.0 mg/dL Final    Toxicology Information 10/31/2023 SEE COMMENT   Final    Comment: DRUGS OF ABUSE SCREEN IS FOR DIAGNOSTIC PURPOSES ONLY.   Not valid for employment drug testing. This is a screening test   only. Positive results are not confirmed by GC/MS unless   requested separately. If confirmation of a positive screen is   desired, notify [facility] within   5 days.    Interpretive Information:    Negative:  No drug detected at the cut off level.     Positive:  This result represents presumptive positive for the   tested drug, other substances may yield a positive response other  than the analyte of interest. This result should be utilized for   diagnostic purpose only. Confirmation testing will be performed   upon physician request only.    Inconclusive:  Result unobtainable by this methodology. Specimen   will be sent out for confirmatory testing.    Cut-off concentrations for the drugs in this profile are as follows:    Amphetamines   500 ng/mL  Barbiturates   200 ng/mL  Benzodiazepines   200 ng/mL  Cocaine Metabolites   150 ng/mL                             Opiates     300 ng/mL  Phencyclidine   25 ng/mL  Marijuana (THC)   20 ng/mL  Tricyclic Antidepressants   300 ng/mL      Alcohol, Serum 10/31/2023 <10  <10 mg/dL Final    Acetaminophen (Tylenol), Serum 10/31/2023 <10.0  10.0 - 20.0 ug/mL Final     Comment: Toxic Levels:  Adults (4 hr post-ingestion).........>150 ug/mL  Adults (12 hr post-ingestion)........>40 ug/mL  Peds (2 hr post-ingestion, liquid)...>225 ug/mL      Ammonia 10/31/2023 18  9 - 30 umol/L Final   Lab Visit on 10/31/2023   Component Date Value Ref Range Status    Sodium 10/31/2023 137  136 - 145 mmol/L Final    Potassium 10/31/2023 3.7  3.5 - 5.1 mmol/L Final    Chloride 10/31/2023 107  95 - 110 mmol/L Final    CO2 10/31/2023 20 (L)  23 - 29 mmol/L Final    Glucose 10/31/2023 81  70 - 110 mg/dL Final    BUN 10/31/2023 33 (H)  6 - 20 mg/dL Final    Creatinine 10/31/2023 1.7 (H)  0.5 - 1.4 mg/dL Final    Calcium 10/31/2023 9.2  8.7 - 10.5 mg/dL Final    Total Protein 10/31/2023 7.3  6.0 - 8.4 g/dL Final    Albumin 10/31/2023 3.8  3.5 - 5.2 g/dL Final    Total Bilirubin 10/31/2023 1.1 (H)  0.1 - 1.0 mg/dL Final    Comment: For infants and newborns, interpretation of results should be based  on gestational age, weight and in agreement with clinical  observations.    Premature Infant recommended reference ranges:  Up to 24 hours.............<8.0 mg/dL  Up to 48 hours............<12.0 mg/dL  3-5 days..................<15.0 mg/dL  6-29 days.................<15.0 mg/dL      Alkaline Phosphatase 10/31/2023 128  55 - 135 U/L Final    AST 10/31/2023 65 (H)  10 - 40 U/L Final    ALT 10/31/2023 30  10 - 44 U/L Final    eGFR 10/31/2023 36.3 (A)  >60 mL/min/1.73 m^2 Final    Anion Gap 10/31/2023 10  8 - 16 mmol/L Final    WBC 10/31/2023 5.25  3.90 - 12.70 K/uL Final    RBC 10/31/2023 3.49 (L)  4.00 - 5.40 M/uL Final    Hemoglobin 10/31/2023 9.8 (L)  12.0 - 16.0 g/dL Final    Hematocrit 10/31/2023 30.4 (L)  37.0 - 48.5 % Final    MCV 10/31/2023 87  82 - 98 fL Final    MCH 10/31/2023 28.1  27.0 - 31.0 pg Final    MCHC 10/31/2023 32.2  32.0 - 36.0 g/dL Final    RDW 10/31/2023 14.6 (H)  11.5 - 14.5 % Final    Platelets 10/31/2023 97 (L)  150 - 450 K/uL Final    MPV 10/31/2023 10.5  9.2 - 12.9 fL Final     Immature Granulocytes 10/31/2023 0.2  0.0 - 0.5 % Final    Gran # (ANC) 10/31/2023 3.4  1.8 - 7.7 K/uL Final    Immature Grans (Abs) 10/31/2023 0.01  0.00 - 0.04 K/uL Final    Comment: Mild elevation in immature granulocytes is non specific and   can be seen in a variety of conditions including stress response,   acute inflammation, trauma and pregnancy. Correlation with other   laboratory and clinical findings is essential.      Lymph # 10/31/2023 1.2  1.0 - 4.8 K/uL Final    Mono # 10/31/2023 0.4  0.3 - 1.0 K/uL Final    Eos # 10/31/2023 0.2  0.0 - 0.5 K/uL Final    Baso # 10/31/2023 0.04  0.00 - 0.20 K/uL Final    nRBC 10/31/2023 0  0 /100 WBC Final    Gran % 10/31/2023 64.4  38.0 - 73.0 % Final    Lymph % 10/31/2023 23.0  18.0 - 48.0 % Final    Mono % 10/31/2023 8.0  4.0 - 15.0 % Final    Eosinophil % 10/31/2023 3.6  0.0 - 8.0 % Final    Basophil % 10/31/2023 0.8  0.0 - 1.9 % Final    Differential Method 10/31/2023 Automated   Final       Route Chart for Scheduling    Med Onc Chart Routing      Follow up with physician 2 months. The patient needs an appt with oncology psychology ASAP.  Pt needs an appt with gastroenterology ASAP for GI bleeding.  Pt needs approval for iron infusions.  She needs a CBC, CMP, ferritin and iron/TIBC in 2 months with an appt with me a day after the labs.   Follow up with ELISHA    Infusion scheduling note    Injection scheduling note    Labs    Imaging    Pharmacy appointment    Other referrals                    Supportive Plan Information  OP IRON SUCROSE IVPB TWICE WEEKLY   Oleg Woo MD   Upcoming Treatment Dates - OP IRON SUCROSE IVPB TWICE WEEKLY    11/10/2023       Medications       iron sucrose injection 200 mg  11/13/2023       Medications       iron sucrose injection 200 mg  11/17/2023       Medications       iron sucrose injection 200 mg  11/20/2023       Medications       iron sucrose injection 200 mg    Therapy Plan Information  IV Fluids  sodium chloride 0.9% bolus  1,000 mL 1,000 mL  1,000 mL, Intravenous, Every visit  Flushes  sodium chloride 0.9% flush 10 mL  10 mL, Intravenous, PRN  heparin, porcine (PF) 100 unit/mL injection flush 500 Units  500 Units, Intravenous, PRN    Imaging:     Reviewed       Assessment:       1. Psychosis, unspecified psychosis type    2. Iron deficiency anemia due to chronic blood loss    3. Thrombocytopenia due to diminished platelet production           Plan:     Psychosis - pt with concern for psychosis given underlying Bipolar DO, possible hepatic encephalopathy and medication effect  -Pt with slurred speech, unsteady gait, tangential thinking and inappropriate responses to questions  -Concern is that the patient can not care for herself given multiple excoriations and breaks in the skin likely suffered from frequent falls at home  -Pt to be PEC'd and sent to [facility] ER for medical and psychiatric evaluation    Iron Deficiency Anemia - Labs on 10/03/23 showed a ferritin of 20ng/mL with TIBC elevated at 475mcg/dL  -Will have the patient scheduled for iron infusions after her acute psychosis is managed    Thrombocytopenia - likely due to hepatitis B and liver disease  -Will monitor    Oleg Woo MD  [facility]  Hematology and Oncology  [facility]   [address] [place] [address]   O: [phone]  F: [phone]

## 2023-10-31 ENCOUNTER — OFFICE VISIT (OUTPATIENT)
Dept: PSYCHIATRY | Facility: CLINIC | Age: 48
End: 2023-10-31
Payer: MEDICAID

## 2023-10-31 ENCOUNTER — OFFICE VISIT (OUTPATIENT)
Dept: HEMATOLOGY/ONCOLOGY | Facility: CLINIC | Age: 48
End: 2023-10-31
Payer: MEDICAID

## 2023-10-31 ENCOUNTER — LAB VISIT (OUTPATIENT)
Dept: LAB | Facility: HOSPITAL | Age: 48
End: 2023-10-31
Attending: STUDENT IN AN ORGANIZED HEALTH CARE EDUCATION/TRAINING PROGRAM
Payer: MEDICAID

## 2023-10-31 VITALS
HEIGHT: 71 IN | TEMPERATURE: 97 F | HEART RATE: 58 BPM | DIASTOLIC BLOOD PRESSURE: 58 MMHG | OXYGEN SATURATION: 100 % | WEIGHT: 192.69 LBS | RESPIRATION RATE: 10 BRPM | BODY MASS INDEX: 26.98 KG/M2 | SYSTOLIC BLOOD PRESSURE: 80 MMHG

## 2023-10-31 DIAGNOSIS — K74.60 CIRRHOSIS OF LIVER WITHOUT ASCITES, UNSPECIFIED HEPATIC CIRRHOSIS TYPE: ICD-10-CM

## 2023-10-31 DIAGNOSIS — D50.0 IRON DEFICIENCY ANEMIA DUE TO CHRONIC BLOOD LOSS: ICD-10-CM

## 2023-10-31 DIAGNOSIS — F29 PSYCHOSIS, UNSPECIFIED PSYCHOSIS TYPE: Primary | ICD-10-CM

## 2023-10-31 DIAGNOSIS — D61.818 PANCYTOPENIA: ICD-10-CM

## 2023-10-31 DIAGNOSIS — D69.59 THROMBOCYTOPENIA DUE TO DIMINISHED PLATELET PRODUCTION: ICD-10-CM

## 2023-10-31 DIAGNOSIS — F43.29 ADJUSTMENT DISORDER WITH OTHER SYMPTOM: Primary | ICD-10-CM

## 2023-10-31 PROBLEM — D50.9 IRON DEFICIENCY ANEMIA: Status: ACTIVE | Noted: 2023-10-31

## 2023-10-31 LAB
ALBUMIN SERPL BCP-MCNC: 3.8 G/DL (ref 3.5–5.2)
ALP SERPL-CCNC: 128 U/L (ref 55–135)
ALT SERPL W/O P-5'-P-CCNC: 30 U/L (ref 10–44)
ANION GAP SERPL CALC-SCNC: 10 MMOL/L (ref 8–16)
AST SERPL-CCNC: 65 U/L (ref 10–40)
BASOPHILS # BLD AUTO: 0.04 K/UL (ref 0–0.2)
BASOPHILS NFR BLD: 0.8 % (ref 0–1.9)
BILIRUB SERPL-MCNC: 1.1 MG/DL (ref 0.1–1)
BUN SERPL-MCNC: 33 MG/DL (ref 6–20)
CALCIUM SERPL-MCNC: 9.2 MG/DL (ref 8.7–10.5)
CHLORIDE SERPL-SCNC: 107 MMOL/L (ref 95–110)
CO2 SERPL-SCNC: 20 MMOL/L (ref 23–29)
CREAT SERPL-MCNC: 1.7 MG/DL (ref 0.5–1.4)
DIFFERENTIAL METHOD: ABNORMAL
EOSINOPHIL # BLD AUTO: 0.2 K/UL (ref 0–0.5)
EOSINOPHIL NFR BLD: 3.6 % (ref 0–8)
ERYTHROCYTE [DISTWIDTH] IN BLOOD BY AUTOMATED COUNT: 14.6 % (ref 11.5–14.5)
EST. GFR  (NO RACE VARIABLE): 36.3 ML/MIN/1.73 M^2
FOLATE SERPL-MCNC: 13.7 NG/ML (ref 4–24)
GLUCOSE SERPL-MCNC: 81 MG/DL (ref 70–110)
HCT VFR BLD AUTO: 30.4 % (ref 37–48.5)
HGB BLD-MCNC: 9.8 G/DL (ref 12–16)
IMM GRANULOCYTES # BLD AUTO: 0.01 K/UL (ref 0–0.04)
IMM GRANULOCYTES NFR BLD AUTO: 0.2 % (ref 0–0.5)
LYMPHOCYTES # BLD AUTO: 1.2 K/UL (ref 1–4.8)
LYMPHOCYTES NFR BLD: 23 % (ref 18–48)
MCH RBC QN AUTO: 28.1 PG (ref 27–31)
MCHC RBC AUTO-ENTMCNC: 32.2 G/DL (ref 32–36)
MCV RBC AUTO: 87 FL (ref 82–98)
MONOCYTES # BLD AUTO: 0.4 K/UL (ref 0.3–1)
MONOCYTES NFR BLD: 8 % (ref 4–15)
NEUTROPHILS # BLD AUTO: 3.4 K/UL (ref 1.8–7.7)
NEUTROPHILS NFR BLD: 64.4 % (ref 38–73)
NRBC BLD-RTO: 0 /100 WBC
PLATELET # BLD AUTO: 97 K/UL (ref 150–450)
PMV BLD AUTO: 10.5 FL (ref 9.2–12.9)
POTASSIUM SERPL-SCNC: 3.7 MMOL/L (ref 3.5–5.1)
PROT SERPL-MCNC: 7.3 G/DL (ref 6–8.4)
RBC # BLD AUTO: 3.49 M/UL (ref 4–5.4)
SODIUM SERPL-SCNC: 137 MMOL/L (ref 136–145)
VIT B12 SERPL-MCNC: 819 PG/ML (ref 210–950)
WBC # BLD AUTO: 5.25 K/UL (ref 3.9–12.7)

## 2023-10-31 PROCEDURE — 85025 COMPLETE CBC W/AUTO DIFF WBC: CPT | Mod: PN

## 2023-10-31 PROCEDURE — 82607 VITAMIN B-12: CPT

## 2023-10-31 PROCEDURE — 99211 OFF/OP EST MAY X REQ PHY/QHP: CPT | Mod: PBBFAC,PN

## 2023-10-31 PROCEDURE — 1159F MED LIST DOCD IN RCRD: CPT | Mod: CPTII,,, | Performed by: INTERNAL MEDICINE

## 2023-10-31 PROCEDURE — 4010F PR ACE/ARB THEARPY RXD/TAKEN: ICD-10-PCS | Mod: CPTII,,,

## 2023-10-31 PROCEDURE — 90785 PR INTERACTIVE COMPLEXITY: ICD-10-PCS | Mod: ,,,

## 2023-10-31 PROCEDURE — 90785 PSYTX COMPLEX INTERACTIVE: CPT | Mod: ,,,

## 2023-10-31 PROCEDURE — 99999 PR PBB SHADOW E&M-EST. PATIENT-LVL V: CPT | Mod: PBBFAC,,, | Performed by: INTERNAL MEDICINE

## 2023-10-31 PROCEDURE — 4010F PR ACE/ARB THEARPY RXD/TAKEN: ICD-10-PCS | Mod: CPTII,,, | Performed by: INTERNAL MEDICINE

## 2023-10-31 PROCEDURE — 3008F BODY MASS INDEX DOCD: CPT | Mod: CPTII,,, | Performed by: INTERNAL MEDICINE

## 2023-10-31 PROCEDURE — 3008F PR BODY MASS INDEX (BMI) DOCUMENTED: ICD-10-PCS | Mod: CPTII,,, | Performed by: INTERNAL MEDICINE

## 2023-10-31 PROCEDURE — 82746 ASSAY OF FOLIC ACID SERUM: CPT

## 2023-10-31 PROCEDURE — 99215 OFFICE O/P EST HI 40 MIN: CPT | Mod: S$PBB,,, | Performed by: INTERNAL MEDICINE

## 2023-10-31 PROCEDURE — 80053 COMPREHEN METABOLIC PANEL: CPT | Mod: PN

## 2023-10-31 PROCEDURE — 90791 PSYCH DIAGNOSTIC EVALUATION: CPT | Mod: ,,,

## 2023-10-31 PROCEDURE — 3074F PR MOST RECENT SYSTOLIC BLOOD PRESSURE < 130 MM HG: ICD-10-PCS | Mod: CPTII,,, | Performed by: INTERNAL MEDICINE

## 2023-10-31 PROCEDURE — 36415 COLL VENOUS BLD VENIPUNCTURE: CPT | Mod: PN

## 2023-10-31 PROCEDURE — 99215 OFFICE O/P EST HI 40 MIN: CPT | Mod: PBBFAC,27,PN | Performed by: INTERNAL MEDICINE

## 2023-10-31 PROCEDURE — 99999 PR PBB SHADOW E&M-EST. PATIENT-LVL I: CPT | Mod: PBBFAC,,,

## 2023-10-31 PROCEDURE — 4010F ACE/ARB THERAPY RXD/TAKEN: CPT | Mod: CPTII,,,

## 2023-10-31 PROCEDURE — 3078F PR MOST RECENT DIASTOLIC BLOOD PRESSURE < 80 MM HG: ICD-10-PCS | Mod: CPTII,,, | Performed by: INTERNAL MEDICINE

## 2023-10-31 PROCEDURE — 3074F SYST BP LT 130 MM HG: CPT | Mod: CPTII,,, | Performed by: INTERNAL MEDICINE

## 2023-10-31 PROCEDURE — 4010F ACE/ARB THERAPY RXD/TAKEN: CPT | Mod: CPTII,,, | Performed by: INTERNAL MEDICINE

## 2023-10-31 PROCEDURE — 1159F PR MEDICATION LIST DOCUMENTED IN MEDICAL RECORD: ICD-10-PCS | Mod: CPTII,,, | Performed by: INTERNAL MEDICINE

## 2023-10-31 PROCEDURE — 99215 PR OFFICE/OUTPT VISIT, EST, LEVL V, 40-54 MIN: ICD-10-PCS | Mod: S$PBB,,, | Performed by: INTERNAL MEDICINE

## 2023-10-31 PROCEDURE — 99999 PR PBB SHADOW E&M-EST. PATIENT-LVL I: ICD-10-PCS | Mod: PBBFAC,,,

## 2023-10-31 PROCEDURE — 99999 PR PBB SHADOW E&M-EST. PATIENT-LVL V: ICD-10-PCS | Mod: PBBFAC,,, | Performed by: INTERNAL MEDICINE

## 2023-10-31 PROCEDURE — 90791 PR PSYCHIATRIC DIAGNOSTIC EVALUATION: ICD-10-PCS | Mod: ,,,

## 2023-10-31 PROCEDURE — 3078F DIAST BP <80 MM HG: CPT | Mod: CPTII,,, | Performed by: INTERNAL MEDICINE

## 2023-10-31 RX ORDER — IBUPROFEN 200 MG
1 TABLET ORAL
COMMUNITY
Start: 2023-10-18 | End: 2023-12-21 | Stop reason: SDUPTHER

## 2023-10-31 RX ORDER — PANTOPRAZOLE SODIUM 40 MG/1
40 TABLET, DELAYED RELEASE ORAL
COMMUNITY
Start: 2023-10-18 | End: 2024-03-18 | Stop reason: SDUPTHER
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
## 2023-11-01 ENCOUNTER — TELEPHONE (OUTPATIENT)
Dept: HEMATOLOGY/ONCOLOGY | Facility: CLINIC | Age: 48
End: 2023-11-01
Payer: MEDICAID

## 2023-11-01 ENCOUNTER — TELEPHONE (OUTPATIENT)
Dept: FAMILY MEDICINE | Facility: CLINIC | Age: 48
End: 2023-11-01

## 2023-11-01 NOTE — TELEPHONE ENCOUNTER
----- Message from Trista Crespo sent at 11/1/2023 10:40 AM CDT -----  PA for Oxycodone. Pt #[phone]

## 2023-11-01 NOTE — TELEPHONE ENCOUNTER
Schedulers advised to set up with Dr Ayala ----- Message from Darlene Edward LPN sent at 11/1/2023  9:36 AM CDT -----  Pt is established with Dr. Ayala; please send referral to his office.     Thanks   ----- Message -----  From: Shahid Aguilar  Sent: 11/1/2023   9:12 AM CDT  To: [facility] Clinical Staff    Type:  Sooner Appointment Request    Caller is requesting a sooner appointment.  Caller declined first available appointment listed below.  Caller will not accept being placed on the waitlist and is requesting a message be sent to doctor.    Name of Caller:  Soledad/ Dr. Woo[facility]  When is the first available appointment?  NP- Medicaid  Symptoms:  GI Bleed  Would the patient rather a call back or a response via [facility]? Call  Best Call Back Number:  Pt- [phone]  Additional Information:  Referred by Dr. Oleg Woo

## 2023-11-01 NOTE — TELEPHONE ENCOUNTER
Spoke with the  and he stated that she has medicaid so right now she can not get appt.   ----- Message from Denise Garcia RN sent at 10/31/2023 11:19 AM CDT -----  This patient should see [facility] next door    ----- Message -----  From: Soledad Donovan MA  Sent: 10/31/2023  10:01 AM CDT  To: [facility] Navigation Outpatient    Follow up with physician 2 months. The patient needs an appt with oncology psychology ASAP.  Pt needs an appt with gastroenterology ASAP for GI bleeding.  Pt needs approval for iron infusions.  She needs a CBC, CMP, ferritin and iron/TIBC in 2 months with an appt with me a day after the labs.

## 2023-11-02 ENCOUNTER — TELEPHONE (OUTPATIENT)
Dept: HEMATOLOGY/ONCOLOGY | Facility: CLINIC | Age: 48
End: 2023-11-02
Payer: MEDICAID

## 2023-11-02 ENCOUNTER — OFFICE VISIT (OUTPATIENT)
Dept: FAMILY MEDICINE | Facility: CLINIC | Age: 48
End: 2023-11-02
Payer: MEDICAID

## 2023-11-02 VITALS
OXYGEN SATURATION: 88 % | BODY MASS INDEX: 27.72 KG/M2 | HEIGHT: 71 IN | SYSTOLIC BLOOD PRESSURE: 90 MMHG | HEART RATE: 56 BPM | DIASTOLIC BLOOD PRESSURE: 52 MMHG | WEIGHT: 198 LBS

## 2023-11-02 DIAGNOSIS — M21.332 LEFT WRIST DROP: ICD-10-CM

## 2023-11-02 DIAGNOSIS — F39 MOOD DISORDER: ICD-10-CM

## 2023-11-02 DIAGNOSIS — I10 ESSENTIAL HYPERTENSION: ICD-10-CM

## 2023-11-02 DIAGNOSIS — K92.2 LOWER GI BLEEDING: Primary | ICD-10-CM

## 2023-11-02 DIAGNOSIS — F31.32 BIPOLAR AFFECTIVE DISORDER, CURRENTLY DEPRESSED, MODERATE: ICD-10-CM

## 2023-11-02 DIAGNOSIS — D50.0 IRON DEFICIENCY ANEMIA DUE TO CHRONIC BLOOD LOSS: ICD-10-CM

## 2023-11-02 DIAGNOSIS — H61.91 EARLOBE LESION, RIGHT: ICD-10-CM

## 2023-11-02 DIAGNOSIS — K74.60 CIRRHOSIS OF LIVER WITHOUT ASCITES, UNSPECIFIED HEPATIC CIRRHOSIS TYPE: ICD-10-CM

## 2023-11-02 PROCEDURE — 99214 PR OFFICE/OUTPT VISIT, EST, LEVL IV, 30-39 MIN: ICD-10-PCS | Mod: S$GLB,,, | Performed by: FAMILY MEDICINE

## 2023-11-02 PROCEDURE — 3078F DIAST BP <80 MM HG: CPT | Mod: CPTII,S$GLB,, | Performed by: FAMILY MEDICINE

## 2023-11-02 PROCEDURE — 99214 OFFICE O/P EST MOD 30 MIN: CPT | Mod: S$GLB,,, | Performed by: FAMILY MEDICINE

## 2023-11-02 PROCEDURE — 3008F BODY MASS INDEX DOCD: CPT | Mod: CPTII,S$GLB,, | Performed by: FAMILY MEDICINE

## 2023-11-02 PROCEDURE — 1160F RVW MEDS BY RX/DR IN RCRD: CPT | Mod: CPTII,S$GLB,, | Performed by: FAMILY MEDICINE

## 2023-11-02 PROCEDURE — 3008F PR BODY MASS INDEX (BMI) DOCUMENTED: ICD-10-PCS | Mod: CPTII,S$GLB,, | Performed by: FAMILY MEDICINE

## 2023-11-02 PROCEDURE — 4010F PR ACE/ARB THEARPY RXD/TAKEN: ICD-10-PCS | Mod: CPTII,S$GLB,, | Performed by: FAMILY MEDICINE

## 2023-11-02 PROCEDURE — 1160F PR REVIEW ALL MEDS BY PRESCRIBER/CLIN PHARMACIST DOCUMENTED: ICD-10-PCS | Mod: CPTII,S$GLB,, | Performed by: FAMILY MEDICINE

## 2023-11-02 PROCEDURE — 3074F SYST BP LT 130 MM HG: CPT | Mod: CPTII,S$GLB,, | Performed by: FAMILY MEDICINE

## 2023-11-02 PROCEDURE — 3078F PR MOST RECENT DIASTOLIC BLOOD PRESSURE < 80 MM HG: ICD-10-PCS | Mod: CPTII,S$GLB,, | Performed by: FAMILY MEDICINE

## 2023-11-02 PROCEDURE — 1159F PR MEDICATION LIST DOCUMENTED IN MEDICAL RECORD: ICD-10-PCS | Mod: CPTII,S$GLB,, | Performed by: FAMILY MEDICINE

## 2023-11-02 PROCEDURE — 1159F MED LIST DOCD IN RCRD: CPT | Mod: CPTII,S$GLB,, | Performed by: FAMILY MEDICINE

## 2023-11-02 PROCEDURE — 4010F ACE/ARB THERAPY RXD/TAKEN: CPT | Mod: CPTII,S$GLB,, | Performed by: FAMILY MEDICINE

## 2023-11-02 PROCEDURE — 3074F PR MOST RECENT SYSTOLIC BLOOD PRESSURE < 130 MM HG: ICD-10-PCS | Mod: CPTII,S$GLB,, | Performed by: FAMILY MEDICINE

## 2023-11-02 RX ORDER — MUPIROCIN CALCIUM 20 MG/G
CREAM TOPICAL 2 TIMES DAILY
Qty: 30 G | Refills: 0 | Status: SHIPPED | OUTPATIENT
Start: 2023-11-02 | End: 2023-11-21 | Stop reason: SDUPTHER

## 2023-11-02 RX ORDER — IBUPROFEN 200 MG
200 TABLET ORAL EVERY 6 HOURS PRN
COMMUNITY

## 2023-11-02 RX ORDER — DOXYCYCLINE 100 MG/1
100 CAPSULE ORAL 2 TIMES DAILY
Qty: 14 CAPSULE | Refills: 0 | Status: SHIPPED | OUTPATIENT
Start: 2023-11-02 | End: 2024-03-18 | Stop reason: ALTCHOICE

## 2023-11-02 RX ORDER — SENNOSIDES 8.6 MG/1
17.2 TABLET ORAL DAILY
COMMUNITY
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
## 2023-11-02 NOTE — TELEPHONE ENCOUNTER
Spoke with the pt and stated to the pt about the appt with DARCIE Ayala on 12/04 @ 4 pm. Pt wrote the appt down because it did not show up on the my chart. Pt verbalized and understanding.

## 2023-11-09 ENCOUNTER — TELEPHONE (OUTPATIENT)
Dept: HEPATOLOGY | Facility: CLINIC | Age: 48
End: 2023-11-09

## 2023-11-09 ENCOUNTER — TELEPHONE (OUTPATIENT)
Dept: HEPATOLOGY | Facility: CLINIC | Age: 48
End: 2023-11-09
Payer: MEDICAID

## 2023-11-09 NOTE — TELEPHONE ENCOUNTER
Call returned to the patient at [phone] to inquire how she was doing?    Patient reiterated all the things she has gone through and her upcoming appts.    It was stressed to the patient to keep her appt with GI since you said you have dark stools before.  Patient stated I cleaned myself out for the GI test and my ride refused to bring me.    Patient encouraged to please keep all of her appts for testing for the bulging disc in your neck, mammogram, scopes and following up with her PCP.  Keep us updated on how you are doing.  I will send a message to Dr Mason to let him know.

## 2023-11-09 NOTE — TELEPHONE ENCOUNTER
----- Message from Patty Mi sent at 11/9/2023  9:47 AM CST -----    Patient Returning Call        Who Called:pt  Does the patient know what this is regarding?:returning call about blood transfusions   Would the patient rather a call back or a response via [facility]? call  Best Call Back Number:[phone]  Additional Information: call back

## 2023-11-09 NOTE — TELEPHONE ENCOUNTER
----- Message from Lia Green sent at 11/9/2023 10:31 AM CST -----  Regarding: returning missed call  Contact:  [phone]  .The patient called returning missed call says she will be leaving at 12, bad results on Mammogram, states poss need for biopsy and a blood transfusion  Please call at your earliest opportunity, if pt is not available please relay information to patients mother    No further information provided      Patient can be contacted @# [phone]

## 2023-11-09 NOTE — TELEPHONE ENCOUNTER
Pt states she has and appt w her GI on the 4th of December , she went to have a mammogram on about a week and a half ago and got a phone call stating she has spots so she has to do US and biopsy . Also having trouble w her left wrist cant lift wrist up can move fingers so she did a mri wind up finding up she has a something going on w her neck . On October 18 pt had a scope done but the provider couldn't find anything pt stated her stool is dark

## 2023-11-10 NOTE — TELEPHONE ENCOUNTER
Rx's set up for Oxycodone & Valium for 4/8/22  Other 2 meds have refills  Dr Dino dorado   Isotretinoin Pregnancy And Lactation Text: This medication is Pregnancy Category X and is considered extremely dangerous during pregnancy. It is unknown if it is excreted in breast milk.

## 2023-11-13 RX ORDER — SODIUM CHLORIDE 0.9 % (FLUSH) 0.9 %
10 SYRINGE (ML) INJECTION
Status: CANCELLED | OUTPATIENT
Start: 2023-11-13

## 2023-11-13 RX ORDER — DIPHENHYDRAMINE HYDROCHLORIDE 50 MG/ML
50 INJECTION INTRAMUSCULAR; INTRAVENOUS ONCE AS NEEDED
Status: CANCELLED | OUTPATIENT
Start: 2023-11-28

## 2023-11-13 RX ORDER — EPINEPHRINE 0.3 MG/.3ML
0.3 INJECTION SUBCUTANEOUS ONCE AS NEEDED
Status: CANCELLED | OUTPATIENT
Start: 2023-11-28

## 2023-11-13 RX ORDER — EPINEPHRINE 0.3 MG/.3ML
0.3 INJECTION SUBCUTANEOUS ONCE AS NEEDED
Status: CANCELLED | OUTPATIENT
Start: 2023-11-13

## 2023-11-13 RX ORDER — HEPARIN 100 UNIT/ML
500 SYRINGE INTRAVENOUS
Status: CANCELLED | OUTPATIENT
Start: 2023-11-28

## 2023-11-13 RX ORDER — DIPHENHYDRAMINE HYDROCHLORIDE 50 MG/ML
50 INJECTION INTRAMUSCULAR; INTRAVENOUS ONCE AS NEEDED
Status: CANCELLED | OUTPATIENT
Start: 2023-11-13

## 2023-11-13 RX ORDER — SODIUM CHLORIDE 0.9 % (FLUSH) 0.9 %
10 SYRINGE (ML) INJECTION
Status: CANCELLED | OUTPATIENT
Start: 2023-11-28

## 2023-11-13 RX ORDER — HEPARIN 100 UNIT/ML
500 SYRINGE INTRAVENOUS
Status: CANCELLED | OUTPATIENT
Start: 2023-11-13

## 2023-11-14 ENCOUNTER — TELEPHONE (OUTPATIENT)
Dept: FAMILY MEDICINE | Facility: CLINIC | Age: 48
End: 2023-11-14

## 2023-11-14 NOTE — TELEPHONE ENCOUNTER
Patient would like to check the status of prior auth on oxycodone.  Calling for Trista Crespo.   [phone]

## 2023-11-15 ENCOUNTER — TELEPHONE (OUTPATIENT)
Dept: FAMILY MEDICINE | Facility: CLINIC | Age: 48
End: 2023-11-15

## 2023-11-15 NOTE — TELEPHONE ENCOUNTER
Tried calling patient, vm box not set up. Would need to call her insurance and see who is on plan in [place].
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
## 2023-11-15 NOTE — TELEPHONE ENCOUNTER
----- Message from Tatum Garcia sent at 11/15/2023  8:14 AM CST -----  The patient said her LT wrist wont lift up and her fingers stay down she has to lift her fingers up. Her thumb on the LT hand  and 1 st  and 2 fingers are numb. She needs to see a neurologist in [place] that takes her insurance, Please call with a name and number, pt's # [phone]

## 2023-11-16 ENCOUNTER — TELEPHONE (OUTPATIENT)
Dept: FAMILY MEDICINE | Facility: CLINIC | Age: 48
End: 2023-11-16

## 2023-11-16 ENCOUNTER — TELEPHONE (OUTPATIENT)
Dept: HEMATOLOGY/ONCOLOGY | Facility: CLINIC | Age: 48
End: 2023-11-16
Payer: MEDICAID

## 2023-11-16 NOTE — TELEPHONE ENCOUNTER
----- Message from Tatum Garcia sent at 11/16/2023 10:27 AM CST -----  The patient said she missed a call from the nurse yesterday.  Please call  The patient said she needs us to put a rush on her disability papers. Pt's # [phone]

## 2023-11-16 NOTE — TELEPHONE ENCOUNTER
Pt states she needs to see a neurologist.advised that we are not sure who is in her network. Please call insurance and let us know who is covered. Pt voiced understanding. Pt also needs disability forms filled out. Advised we have not received any forms that needed to be filled out. Advised there was a medical record request, but not forms. Pt voiced understanding.

## 2023-11-16 NOTE — TELEPHONE ENCOUNTER
Left message returning call  ----- Message from Riya Woodruff sent at 11/16/2023 10:32 AM CST -----  Contact: Pt  Type:  Needs Medical Advice    Who Called: Pt  Would the patient rather a call back or a response via [facility]? call  Best Call Back Number: [phone]  Additional Information: Pt states that she needs Dr. Woo to put a rush on her disability paperwork, and she's already scheduled for the iron infusions. Pt states that she doesn't have anywhere to live, and she needs the disability paperwork filled out ASAP. Please call pt back to advise.

## 2023-11-20 ENCOUNTER — TELEPHONE (OUTPATIENT)
Dept: INFUSION THERAPY | Facility: HOSPITAL | Age: 48
End: 2023-11-20
Payer: MEDICAID

## 2023-11-20 NOTE — TELEPHONE ENCOUNTER
----- Message from Lucrecia Carrera sent at 11/20/2023 10:42 AM CST -----  Type: Needs Medical Advice  Who Called:  Patient   Symptoms (please be specific):    How long has patient had these symptoms:    Pharmacy name and phone #:    Best Call Back Number:   Additional Information: Patient is requesting a call back to reschedule her appt. on 11/20 at 3:30.

## 2023-11-21 DIAGNOSIS — H61.91 EARLOBE LESION, RIGHT: ICD-10-CM

## 2023-11-21 RX ORDER — MUPIROCIN CALCIUM 20 MG/G
CREAM TOPICAL 2 TIMES DAILY
Qty: 30 G | Refills: 2 | Status: SHIPPED | OUTPATIENT
Start: 2023-11-21

## 2023-11-21 NOTE — TELEPHONE ENCOUNTER
Spoke with pt. Advised she needs to call the number on the back of her insurance card. Pt voiced understanding. Rx set up

## 2023-11-21 NOTE — TELEPHONE ENCOUNTER
The patient's prescription has been approved and sent to   [facility] [address]  [address] [facility] [address]  Phone: [phone] Fax: [phone]

## 2023-11-21 NOTE — TELEPHONE ENCOUNTER
----- Message from Faby Del Cid sent at 11/21/2023  8:56 AM CST -----  Pt needs refill on ointment mupriocion. It is very small tubes and would like more than one called in   She is also having trouble finding a neurologist for her hand.   Efraín oconnell   [phone]

## 2023-11-24 ENCOUNTER — TELEPHONE (OUTPATIENT)
Dept: NEUROLOGY | Facility: CLINIC | Age: 48
End: 2023-11-24
Payer: MEDICAID

## 2023-11-25 NOTE — TELEPHONE ENCOUNTER
----- Message from Faby Marshall sent at 11/24/2023  4:31 PM CST -----  Needs advice from nurse:      Who Called:pt  Regarding:referral in the system for left wrist drop//please call patient to schedule or to refer to a neurologist that treats this condition  Would the patient rather a call back or [facility]?  Best Call Back number:[phone]  Additional Info:pt states it is ok to speak to patient's mother to schedule and discuss the issue

## 2023-11-27 ENCOUNTER — INFUSION (OUTPATIENT)
Dept: INFUSION THERAPY | Facility: HOSPITAL | Age: 48
End: 2023-11-27
Payer: MEDICAID

## 2023-11-27 VITALS
TEMPERATURE: 98 F | RESPIRATION RATE: 16 BRPM | DIASTOLIC BLOOD PRESSURE: 53 MMHG | OXYGEN SATURATION: 99 % | HEIGHT: 71 IN | BODY MASS INDEX: 27.47 KG/M2 | HEART RATE: 63 BPM | SYSTOLIC BLOOD PRESSURE: 97 MMHG | WEIGHT: 196.19 LBS

## 2023-11-27 DIAGNOSIS — F41.9 ANXIETY: ICD-10-CM

## 2023-11-27 DIAGNOSIS — G89.4 CHRONIC PAIN SYNDROME: ICD-10-CM

## 2023-11-27 DIAGNOSIS — D50.0 IRON DEFICIENCY ANEMIA DUE TO CHRONIC BLOOD LOSS: Primary | ICD-10-CM

## 2023-11-27 PROCEDURE — 25000003 PHARM REV CODE 250: Mod: PN | Performed by: INTERNAL MEDICINE

## 2023-11-27 PROCEDURE — A4216 STERILE WATER/SALINE, 10 ML: HCPCS | Mod: PN | Performed by: INTERNAL MEDICINE

## 2023-11-27 PROCEDURE — 96374 THER/PROPH/DIAG INJ IV PUSH: CPT | Mod: PN

## 2023-11-27 PROCEDURE — 63600175 PHARM REV CODE 636 W HCPCS: Mod: PN | Performed by: INTERNAL MEDICINE

## 2023-11-27 RX ORDER — OXYCODONE HYDROCHLORIDE 15 MG/1
15 TABLET ORAL EVERY 6 HOURS PRN
Qty: 120 TABLET | Refills: 0 | Status: SHIPPED | OUTPATIENT
Start: 2023-11-29 | End: 2023-12-27 | Stop reason: SDUPTHER

## 2023-11-27 RX ORDER — SODIUM CHLORIDE 0.9 % (FLUSH) 0.9 %
10 SYRINGE (ML) INJECTION
Status: DISCONTINUED | OUTPATIENT
Start: 2023-11-27 | End: 2023-11-27 | Stop reason: HOSPADM

## 2023-11-27 RX ORDER — DIAZEPAM 10 MG/1
10 TABLET ORAL 3 TIMES DAILY
Qty: 90 TABLET | Refills: 1 | Status: SHIPPED | OUTPATIENT
Start: 2023-11-27 | End: 2023-12-06

## 2023-11-27 RX ORDER — GABAPENTIN 300 MG/1
300 CAPSULE ORAL 2 TIMES DAILY
Qty: 60 CAPSULE | Refills: 2 | Status: SHIPPED | OUTPATIENT
Start: 2023-11-27 | End: 2024-01-29 | Stop reason: SDUPTHER

## 2023-11-27 RX ORDER — EPINEPHRINE 0.3 MG/.3ML
0.3 INJECTION SUBCUTANEOUS ONCE AS NEEDED
Status: DISCONTINUED | OUTPATIENT
Start: 2023-11-27 | End: 2023-11-27 | Stop reason: HOSPADM

## 2023-11-27 RX ORDER — DIPHENHYDRAMINE HYDROCHLORIDE 50 MG/ML
50 INJECTION INTRAMUSCULAR; INTRAVENOUS ONCE AS NEEDED
Status: DISCONTINUED | OUTPATIENT
Start: 2023-11-27 | End: 2023-11-27 | Stop reason: HOSPADM

## 2023-11-27 RX ADMIN — Medication 10 ML: at 05:11

## 2023-11-27 RX ADMIN — IRON SUCROSE 200 MG: 20 INJECTION, SOLUTION INTRAVENOUS at 04:11

## 2023-11-27 RX ADMIN — SODIUM CHLORIDE: 9 INJECTION, SOLUTION INTRAVENOUS at 04:11

## 2023-11-27 NOTE — PLAN OF CARE
Problem: Adult Inpatient Plan of Care  Goal: Plan of Care Review  Outcome: Ongoing, Progressing  Flowsheets (Taken 11/27/2023 1700)  Plan of Care Reviewed With: patient  Goal: Patient-Specific Goal (Individualized)  Outcome: Ongoing, Progressing  Flowsheets (Taken 11/27/2023 1700)  Anxieties, Fears or Concerns: poor PIV access  Individualized Care Needs: recliner, pillow, approval of vein for PIV, education, conversation, sleep, snacks     Problem: Fatigue  Goal: Improved Activity Tolerance  Outcome: Ongoing, Progressing  Intervention: Promote Improved Energy  Flowsheets (Taken 11/27/2023 1700)  Fatigue Management:   fatigue-related activity identified   paced activity encouraged   frequent rest breaks encouraged  Sleep/Rest Enhancement:   therapeutic touch utilized   relaxation techniques promoted   natural light exposure provided   noise level reduced   family presence promoted  Activity Management:   Ambulated -L4   Ambulated to bathroom - L4   Up in chair - L3

## 2023-11-27 NOTE — TELEPHONE ENCOUNTER
The patient's prescription has been approved and sent to   [facility] - [facility] [address]  [address] [facility] [address]  Phone: [phone] Fax: [phone]

## 2023-11-28 ENCOUNTER — TELEPHONE (OUTPATIENT)
Dept: FAMILY MEDICINE | Facility: CLINIC | Age: 48
End: 2023-11-28

## 2023-11-28 NOTE — TELEPHONE ENCOUNTER
Spoke to pt and she stated she paid for her 45 tablets last month and she did not get the other 75 tablets and she wants it filled today. Explained to pt the  we run is showing she paid private for 75 tablets and medicaid paid for 45 tablets. Pt then became upset stated she pain for the 45 tablets with her own Money and she needs a PA for all 120. Explained to pt I am going off when it was sold. Pt then stated thanks good bye and hung up.      Spoke to at [facility] Pharmacist at [facility]'s pharmacy and he stated pt did  all 120 tablets and she paid cash for some and insurance paid for some.

## 2023-11-28 NOTE — TELEPHONE ENCOUNTER
----- Message from Tatum Garcia sent at 11/28/2023  9:38 AM CST -----  The patient said on her oxycodone prescription it says she can fill it on the 29 th. She is out and she filled it on the 28 th last month. She wants us to call [facility]'s  Pharmacy and tell them she can fill it today.The patient wants Dr. Sun to call her tomorrow. She can not understand why her medication says fill on the 29 th Pt's # [phone]

## 2023-11-28 NOTE — TELEPHONE ENCOUNTER
----- Message from Faby Del Cid sent at 11/28/2023 10:24 AM CST -----  Pt is calling about her oxycodone. It is supposed to be dated today and not tomorrow. She only got 45 tablets last month because she paid for them   [phone]

## 2023-12-03 ENCOUNTER — TELEPHONE (OUTPATIENT)
Dept: FAMILY MEDICINE | Facility: CLINIC | Age: 48
End: 2023-12-03

## 2023-12-05 ENCOUNTER — INFUSION (OUTPATIENT)
Dept: INFUSION THERAPY | Facility: HOSPITAL | Age: 48
End: 2023-12-05
Payer: MEDICAID

## 2023-12-05 VITALS
RESPIRATION RATE: 16 BRPM | HEART RATE: 107 BPM | BODY MASS INDEX: 26.55 KG/M2 | SYSTOLIC BLOOD PRESSURE: 125 MMHG | WEIGHT: 189.63 LBS | HEIGHT: 71 IN | DIASTOLIC BLOOD PRESSURE: 70 MMHG | OXYGEN SATURATION: 99 % | TEMPERATURE: 99 F

## 2023-12-05 DIAGNOSIS — D50.0 IRON DEFICIENCY ANEMIA DUE TO CHRONIC BLOOD LOSS: Primary | ICD-10-CM

## 2023-12-05 PROCEDURE — 25000003 PHARM REV CODE 250: Mod: PN | Performed by: INTERNAL MEDICINE

## 2023-12-05 PROCEDURE — 96374 THER/PROPH/DIAG INJ IV PUSH: CPT | Mod: PN

## 2023-12-05 PROCEDURE — 63600175 PHARM REV CODE 636 W HCPCS: Mod: PN | Performed by: INTERNAL MEDICINE

## 2023-12-05 RX ORDER — EPINEPHRINE 0.3 MG/.3ML
0.3 INJECTION SUBCUTANEOUS ONCE AS NEEDED
Status: DISCONTINUED | OUTPATIENT
Start: 2023-12-05 | End: 2023-12-05 | Stop reason: HOSPADM

## 2023-12-05 RX ORDER — SODIUM CHLORIDE 0.9 % (FLUSH) 0.9 %
10 SYRINGE (ML) INJECTION
Status: DISCONTINUED | OUTPATIENT
Start: 2023-12-05 | End: 2023-12-05 | Stop reason: HOSPADM

## 2023-12-05 RX ORDER — DIPHENHYDRAMINE HYDROCHLORIDE 50 MG/ML
50 INJECTION INTRAMUSCULAR; INTRAVENOUS ONCE AS NEEDED
Status: DISCONTINUED | OUTPATIENT
Start: 2023-12-05 | End: 2023-12-05 | Stop reason: HOSPADM

## 2023-12-05 RX ADMIN — SODIUM CHLORIDE: 9 INJECTION, SOLUTION INTRAVENOUS at 02:12

## 2023-12-05 RX ADMIN — IRON SUCROSE 200 MG: 20 INJECTION, SOLUTION INTRAVENOUS at 02:12

## 2023-12-05 NOTE — TELEPHONE ENCOUNTER
----- Message from Trista Crespo sent at 12/5/2023 12:11 PM CST -----  Formulary alternatives for diazepam are: Alprazolam, Buspirone and lorazepam. Please advise. Thank you

## 2023-12-05 NOTE — PLAN OF CARE
Problem: Adult Inpatient Plan of Care  Goal: Patient-Specific Goal (Individualized)  Outcome: Ongoing, Progressing  Flowsheets (Taken 12/5/2023 1609)  Anxieties, Fears or Concerns: IV access  Individualized Care Needs: henrry Maravilla     Problem: Fatigue  Goal: Improved Activity Tolerance  Intervention: Promote Improved Energy  Flowsheets (Taken 12/5/2023 1609)  Fatigue Management:   activity schedule adjusted   paced activity encouraged   frequent rest breaks encouraged  Sleep/Rest Enhancement:   relaxation techniques promoted   regular sleep/rest pattern promoted  Activity Management:   Ambulated -L4   Ambulated in [place] - L4     Problem: Adult Inpatient Plan of Care  Goal: Plan of Care Review  Outcome: Ongoing, Progressing  Flowsheets (Taken 12/5/2023 1609)  Plan of Care Reviewed With: patient  Tolerated treatment with no known distress.  Ambulated from infusion center with steady gait.

## 2023-12-06 RX ORDER — LORAZEPAM 1 MG/1
1 TABLET ORAL EVERY 6 HOURS PRN
Status: CANCELLED | OUTPATIENT
Start: 2023-12-06

## 2023-12-07 ENCOUNTER — TELEPHONE (OUTPATIENT)
Dept: FAMILY MEDICINE | Facility: CLINIC | Age: 48
End: 2023-12-07

## 2023-12-07 DIAGNOSIS — D50.0 IRON DEFICIENCY ANEMIA DUE TO CHRONIC BLOOD LOSS: Primary | ICD-10-CM

## 2023-12-07 DIAGNOSIS — K92.2 LOWER GI BLEEDING: ICD-10-CM

## 2023-12-07 NOTE — TELEPHONE ENCOUNTER
Hey, why do I have this so early, she just filled on 11/28/23. Are you just trying to get the PA done. Looks like she has tried lorazepam in the past and it didn't work, so may not send it in

## 2023-12-07 NOTE — TELEPHONE ENCOUNTER
----- Message from Trista Crespo sent at 12/7/2023  3:56 PM CST -----  PA #[id number] for Diazepam has been approved for 11/28/23 - 3/27/24. Notified pt. Thank you

## 2023-12-07 NOTE — TELEPHONE ENCOUNTER
----- Message from Trista Crespo sent at 12/7/2023  3:58 PM CST -----  Pt needs a referral to the gastroenterologist Dr. Ayala in [place]. Pt #[phone].

## 2023-12-14 ENCOUNTER — TELEPHONE (OUTPATIENT)
Dept: FAMILY MEDICINE | Facility: CLINIC | Age: 48
End: 2023-12-14

## 2023-12-14 NOTE — TELEPHONE ENCOUNTER
Spoke with pt. Advised we do not have any forms to mail. She will need to get the forms to us. Pt voiced understanding.

## 2023-12-14 NOTE — TELEPHONE ENCOUNTER
----- Message from Tatum Garcia sent at 12/14/2023  2:13 PM CST -----  The patient said the faxes are not going through to our office. It is the patients disability papers. Can you mail them. The address is [address] [place] [address] case # is [id number] .  # [phone] pt's # [phone]

## 2023-12-14 NOTE — TELEPHONE ENCOUNTER
----- Message from Faby Del Cid sent at 12/14/2023  2:34 PM CST -----  Pt disability paper work was mailed to us Tuesday and it is going to be 2 of them.   [phone]

## 2023-12-19 ENCOUNTER — TELEPHONE (OUTPATIENT)
Dept: FAMILY MEDICINE | Facility: CLINIC | Age: 48
End: 2023-12-19
Payer: MEDICAID

## 2023-12-19 NOTE — TELEPHONE ENCOUNTER
----- Message from Trista Crespo sent at 12/19/2023 12:52 PM CST -----  Pt needs an RX for Nicotine patches. [facility]. Did you receive the disability paperwork. Pt #[phone]

## 2023-12-19 NOTE — TELEPHONE ENCOUNTER
----- Message from Faby Del Cid sent at 12/19/2023 12:29 PM CST -----  Pt needs referral to [facility]   [phone]

## 2023-12-19 NOTE — TELEPHONE ENCOUNTER
----- Message from Trista Crespo sent at 12/19/2023 12:52 PM CST -----  Pt needs an RX for Nicotine patches. [facility]. Did you receive the disability paperwork. Pt #[phone]

## 2023-12-20 DIAGNOSIS — Z71.6 ENCOUNTER FOR SMOKING CESSATION COUNSELING: ICD-10-CM

## 2023-12-20 DIAGNOSIS — R20.0 HAND NUMBNESS: Primary | ICD-10-CM

## 2023-12-20 NOTE — TELEPHONE ENCOUNTER
----- Message from Tatum Garcia sent at 12/20/2023  8:34 AM CST -----  The patient is returning a call from yesterday pt's # [phone]

## 2023-12-20 NOTE — TELEPHONE ENCOUNTER
Spoke with pt. States while she was in the hospital they gave her nicotine patches. She is not out of then and requesting rx.   Pt states she also needs a referral for her hand. States she has ongoing hand pain/numbness. She has been trying to find a neurologist that takes her insurance. Pt states she would like referral to [facility] in [place].     Referral set up

## 2023-12-21 RX ORDER — IBUPROFEN 200 MG
1 TABLET ORAL DAILY
Qty: 28 PATCH | Refills: 2 | Status: SHIPPED | OUTPATIENT
Start: 2023-12-21 | End: 2024-03-18 | Stop reason: SDUPTHER

## 2023-12-21 NOTE — TELEPHONE ENCOUNTER
----- Message from Faby Del Cid sent at 12/21/2023  3:02 PM CST -----  Pt was supposed to get some nicotine patches and they are not at her pharmacy   [facility]   [phone]

## 2023-12-26 ENCOUNTER — TELEPHONE (OUTPATIENT)
Dept: FAMILY MEDICINE | Facility: CLINIC | Age: 48
End: 2023-12-26
Payer: MEDICAID

## 2023-12-26 DIAGNOSIS — G89.4 CHRONIC PAIN SYNDROME: ICD-10-CM

## 2023-12-26 NOTE — TELEPHONE ENCOUNTER
"Spoke with pt mom. Pt is currently at Dr. Rice for "ongoing issue" states she will have pt call us back.   "

## 2023-12-26 NOTE — TELEPHONE ENCOUNTER
----- Message from Faby Del Cid sent at 12/26/2023  9:13 AM CST -----  Pt needs refill on pain medication and valium   [facility]

## 2023-12-26 NOTE — TELEPHONE ENCOUNTER
----- Message from Faby Del Cid sent at 12/26/2023  9:09 AM CST -----  Pt would like to be seen before feb. She has been losing blood, having trouble with her hand. Has not heard from anyone about the referral we were supposed to send about her hand. She just got her ear cut open and has a f/u for that today.   [phone]

## 2023-12-27 ENCOUNTER — TELEPHONE (OUTPATIENT)
Dept: FAMILY MEDICINE | Facility: CLINIC | Age: 48
End: 2023-12-27
Payer: MEDICAID

## 2023-12-27 RX ORDER — OXYCODONE HYDROCHLORIDE 15 MG/1
15 TABLET ORAL EVERY 6 HOURS PRN
Qty: 120 TABLET | Refills: 0 | Status: SHIPPED | OUTPATIENT
Start: 2023-12-29 | End: 2024-01-29 | Stop reason: SDUPTHER

## 2023-12-27 NOTE — TELEPHONE ENCOUNTER
Pt states she has been seeing a GI at [facility] for GI bleed. They are doing colonoscopy and upper scope on 1/11/24. She also had an appt with another family med just to follow up on abscess on ear. States she has follow up next week. Pt advised we do not want her seeing two primary cares. Pt voiced understanding. States she will stop seeing other pcp. Appt has been scheduled with Dr. Sun. Pt also requesting early refill of pain medication due to taking extra. Advised pt we did not authorize taking more than prescribed. Pt states she will wait for her rx on 12/29

## 2023-12-27 NOTE — TELEPHONE ENCOUNTER
Tatum Garcia Staff  Caller: Unspecified (Today, 10:14 AM)  The patient is returning Carmen's call pt's # [phone]

## 2023-12-27 NOTE — ADDENDUM NOTE
Addended by: JOURDAN POSEY on: 12/27/2023 11:25 AM     Modules accepted: Orders     [FreeTextEntry1] : Reviewed and reconciled medications, allergies, PMHx, PSHx, SocHx, FMHx.\par \par \par \par physical exam:\par right ear: tube in ear\par left ear: retracted\par deviated septum\par inflamed turbinates\par tonsils class 1, small cyst right tonsil, type 3 oral cavity\par \par \par Procedure:  Flexible Nasal Endoscopy: Risks, benefits, and alternatives of flexible endoscopy were explained to the patient. The patient gave oral consent to proceed.  The flexible scope was inserted into the right nasal cavity.  Endoscopy of the inferior and middle meatus was performed. \par -left: polyps in middle meatus, some purulent discharge. \par right- deviated septum, polyps \par \par \par audio:\par \par -TYMPS: TYPE B AD, TYPE A (ETF ABNORMAL) AS\par -AD: ESSENTIALLY MILD -8000 HZ\par -AS: HEARING ESSENTIALLY -6000 HZ TO A MILD HF HL AT 8000 HZ (SLIGHT CHL NOTED AT 4KHZ AS)\par \par \par Discussed r/b/a of starting steroids. Patient states she has taken steroids in the past and she manages her sugars well while on steroids. She will also speak to her endocrinologist about being on steroids.\par \par \par Plan:\par Zithromax\par Dymista - 1 spray bilaterally BID, spray laterally\par Medrol dose pack\par Follow up in 10 days\par \par \par Case discussed with Dr. Villar\par

## 2023-12-27 NOTE — TELEPHONE ENCOUNTER
The patient's prescription has been approved and sent to   [facility] - [facility] [address]  [address] [facility] [address]  Phone: [phone] Fax: [phone]

## 2023-12-27 NOTE — TELEPHONE ENCOUNTER
----- Message from Cristal Sawyer sent at 12/27/2023  9:07 AM CST -----  Pt was calling back Carmen.  [phone]

## 2023-12-27 NOTE — TELEPHONE ENCOUNTER
----- Message from Cristal Sawyer sent at 12/27/2023  9:07 AM CST -----  Pt was calling back Carmen.  [phone]

## 2023-12-29 ENCOUNTER — LAB VISIT (OUTPATIENT)
Dept: LAB | Facility: HOSPITAL | Age: 48
End: 2023-12-29
Attending: INTERNAL MEDICINE
Payer: MEDICAID

## 2023-12-29 DIAGNOSIS — D50.0 IRON DEFICIENCY ANEMIA DUE TO CHRONIC BLOOD LOSS: ICD-10-CM

## 2023-12-29 LAB
ALBUMIN SERPL BCP-MCNC: 3.1 G/DL (ref 3.5–5.2)
ALP SERPL-CCNC: 115 U/L (ref 55–135)
ALT SERPL W/O P-5'-P-CCNC: 14 U/L (ref 10–44)
ANION GAP SERPL CALC-SCNC: 4 MMOL/L (ref 8–16)
AST SERPL-CCNC: 20 U/L (ref 10–40)
BASOPHILS # BLD AUTO: 0.03 K/UL (ref 0–0.2)
BASOPHILS NFR BLD: 0.9 % (ref 0–1.9)
BILIRUB SERPL-MCNC: 0.3 MG/DL (ref 0.1–1)
BUN SERPL-MCNC: 36 MG/DL (ref 6–20)
CALCIUM SERPL-MCNC: 8.9 MG/DL (ref 8.7–10.5)
CHLORIDE SERPL-SCNC: 108 MMOL/L (ref 95–110)
CO2 SERPL-SCNC: 25 MMOL/L (ref 23–29)
CREAT SERPL-MCNC: 1.4 MG/DL (ref 0.5–1.4)
DIFFERENTIAL METHOD BLD: ABNORMAL
EOSINOPHIL # BLD AUTO: 0.2 K/UL (ref 0–0.5)
EOSINOPHIL NFR BLD: 4.6 % (ref 0–8)
ERYTHROCYTE [DISTWIDTH] IN BLOOD BY AUTOMATED COUNT: 16.8 % (ref 11.5–14.5)
EST. GFR  (NO RACE VARIABLE): 45.8 ML/MIN/1.73 M^2
FERRITIN SERPL-MCNC: 61 NG/ML (ref 20–300)
GLUCOSE SERPL-MCNC: 107 MG/DL (ref 70–110)
HCT VFR BLD AUTO: 29.2 % (ref 37–48.5)
HGB BLD-MCNC: 9.2 G/DL (ref 12–16)
IMM GRANULOCYTES # BLD AUTO: 0 K/UL (ref 0–0.04)
IMM GRANULOCYTES NFR BLD AUTO: 0 % (ref 0–0.5)
IRON SERPL-MCNC: 59 UG/DL (ref 30–160)
LYMPHOCYTES # BLD AUTO: 0.8 K/UL (ref 1–4.8)
LYMPHOCYTES NFR BLD: 25.4 % (ref 18–48)
MCH RBC QN AUTO: 28 PG (ref 27–31)
MCHC RBC AUTO-ENTMCNC: 31.5 G/DL (ref 32–36)
MCV RBC AUTO: 89 FL (ref 82–98)
MONOCYTES # BLD AUTO: 0.3 K/UL (ref 0.3–1)
MONOCYTES NFR BLD: 7.7 % (ref 4–15)
NEUTROPHILS # BLD AUTO: 2 K/UL (ref 1.8–7.7)
NEUTROPHILS NFR BLD: 61.4 % (ref 38–73)
NRBC BLD-RTO: 0 /100 WBC
PLATELET # BLD AUTO: 114 K/UL (ref 150–450)
PMV BLD AUTO: 9.4 FL (ref 9.2–12.9)
POTASSIUM SERPL-SCNC: 6.2 MMOL/L (ref 3.5–5.1)
PROT SERPL-MCNC: 6.5 G/DL (ref 6–8.4)
RBC # BLD AUTO: 3.28 M/UL (ref 4–5.4)
SATURATED IRON: 15 % (ref 20–50)
SODIUM SERPL-SCNC: 137 MMOL/L (ref 136–145)
TOTAL IRON BINDING CAPACITY: 400 UG/DL (ref 250–450)
TRANSFERRIN SERPL-MCNC: 270 MG/DL (ref 200–375)
WBC # BLD AUTO: 3.23 K/UL (ref 3.9–12.7)

## 2023-12-29 PROCEDURE — 83540 ASSAY OF IRON: CPT | Performed by: INTERNAL MEDICINE

## 2023-12-29 PROCEDURE — 80053 COMPREHEN METABOLIC PANEL: CPT | Mod: PN | Performed by: INTERNAL MEDICINE

## 2023-12-29 PROCEDURE — 85025 COMPLETE CBC W/AUTO DIFF WBC: CPT | Mod: PN | Performed by: INTERNAL MEDICINE

## 2023-12-29 PROCEDURE — 36415 COLL VENOUS BLD VENIPUNCTURE: CPT | Mod: PN | Performed by: INTERNAL MEDICINE

## 2023-12-29 PROCEDURE — 82728 ASSAY OF FERRITIN: CPT | Performed by: INTERNAL MEDICINE

## 2024-01-04 ENCOUNTER — OFFICE VISIT (OUTPATIENT)
Dept: HEMATOLOGY/ONCOLOGY | Facility: CLINIC | Age: 49
End: 2024-01-04
Payer: MEDICAID

## 2024-01-04 VITALS
BODY MASS INDEX: 27.09 KG/M2 | SYSTOLIC BLOOD PRESSURE: 100 MMHG | WEIGHT: 194.25 LBS | TEMPERATURE: 97 F | OXYGEN SATURATION: 98 % | DIASTOLIC BLOOD PRESSURE: 78 MMHG | HEART RATE: 82 BPM

## 2024-01-04 DIAGNOSIS — D50.0 IRON DEFICIENCY ANEMIA DUE TO CHRONIC BLOOD LOSS: ICD-10-CM

## 2024-01-04 DIAGNOSIS — K74.60 CIRRHOSIS OF LIVER WITHOUT ASCITES, UNSPECIFIED HEPATIC CIRRHOSIS TYPE: Primary | ICD-10-CM

## 2024-01-04 DIAGNOSIS — R76.8 HEPATITIS B SURFACE ANTIGEN POSITIVE: ICD-10-CM

## 2024-01-04 DIAGNOSIS — D61.818 PANCYTOPENIA: ICD-10-CM

## 2024-01-04 PROCEDURE — 99214 OFFICE O/P EST MOD 30 MIN: CPT | Mod: PBBFAC,PN | Performed by: INTERNAL MEDICINE

## 2024-01-04 PROCEDURE — 3078F DIAST BP <80 MM HG: CPT | Mod: CPTII,,, | Performed by: INTERNAL MEDICINE

## 2024-01-04 PROCEDURE — 99999 PR PBB SHADOW E&M-EST. PATIENT-LVL IV: CPT | Mod: PBBFAC,,, | Performed by: INTERNAL MEDICINE

## 2024-01-04 PROCEDURE — 3008F BODY MASS INDEX DOCD: CPT | Mod: CPTII,,, | Performed by: INTERNAL MEDICINE

## 2024-01-04 PROCEDURE — 99214 OFFICE O/P EST MOD 30 MIN: CPT | Mod: S$PBB,,, | Performed by: INTERNAL MEDICINE

## 2024-01-04 PROCEDURE — 3074F SYST BP LT 130 MM HG: CPT | Mod: CPTII,,, | Performed by: INTERNAL MEDICINE

## 2024-01-04 PROCEDURE — 1159F MED LIST DOCD IN RCRD: CPT | Mod: CPTII,,, | Performed by: INTERNAL MEDICINE

## 2024-01-04 NOTE — PROGRESS NOTES
PATIENT: Marie Damon  MRN: [id number]  DATE: 1/3/2024      Diagnosis:   No diagnosis found.      Chief Complaint: No chief complaint on file.      Oncologic History:      Oncologic History     Oncologic Treatment     Pathology           Subjective:    Initial History: Ms. Damon is a 48 y.o. female with HTN, Hepatitis B, thrombocytopenia, bipolar disorder who presents for iron deficiency anemia.  Pt previously diagnosed with hepatitis B and is currently on vemlidy.  Pt has recently in the hospital for GI bleed. Labs on 10/03/23 showed a ferritin of 20ng/mL with TIBC elevated at 475mcg/dL.  Pt states she is currently having difficulty walking at home and has fallen frequently.  The patient also states she sees things floating in her spittle and has been collecting samples with napkins.  She states she follows with a psychiatrist.  She endorses chronic abdominal pain.  The patient denies CP, cough, SOB, nausea, vomiting, constipation, diarrhea.  The patient denies fever, chills, night sweats, weight loss, new lumps or bumps, easy bruising or bleeding.    24 EGD and colonoscopy [facility]     Past Medical History:   Past Medical History:   Diagnosis Date    Bone spur     hip    HTN (hypertension)        Past Surgical HIstory:   Past Surgical History:   Procedure Laterality Date     SECTION, LOW TRANSVERSE      x2    CHOLECYSTECTOMY      HYSTERECTOMY         Family History: No family history on file.    Social History:  reports that she has been smoking cigarettes. She has been exposed to tobacco smoke. She has never used smokeless tobacco. She reports that she does not currently use alcohol. She reports that she does not currently use drugs.    Allergies:  Review of patient's allergies indicates:   Allergen Reactions    Pcn [penicillins] Hives    Morphine Other (See Comments)     heartburn    Bupropion hcl Other (See Comments)    Demerol [meperidine]        Medications:  Current  Outpatient Medications   Medication Sig Dispense Refill    bisoprolol (ZEBETA) 5 MG tablet Take 1 tablet (5 mg total) by mouth once daily. 30 tablet 2    cariprazine (VRAYLAR) 1.5 mg Cap Take 1 capsule (1.5 mg total) by mouth Daily. 30 capsule 3    dicyclomine (BENTYL) 20 mg tablet Take 1 tablet (20 mg total) by mouth 4 (four) times daily. 120 tablet 3    doxycycline (VIBRAMYCIN) 100 MG Cap Take 1 capsule (100 mg total) by mouth 2 (two) times daily. 14 capsule 0    EScitalopram oxalate (LEXAPRO) 20 MG tablet Take 1 tablet (20 mg total) by mouth once daily. 30 tablet 11    furosemide (LASIX) 20 MG tablet Take 1 tablet (20 mg total) by mouth once daily. 30 tablet 5    gabapentin (NEURONTIN) 300 MG capsule Take 1 capsule (300 mg total) by mouth 2 (two) times daily. 60 capsule 2    GAVILYTE-G 236-22.74-6.74 -5.86 gram suspension Take by mouth.      ibuprofen (ADVIL,MOTRIN) 200 MG tablet Take 200 mg by mouth every 6 (six) hours as needed for Pain.      lisinopriL 10 MG tablet Take 1 tablet (10 mg total) by mouth once daily. 90 tablet 1    mupirocin calcium 2% (BACTROBAN) 2 % cream Apply topically 2 (two) times daily. 30 g 2    naloxone (NARCAN) 4 mg/actuation Spry each nostril if not breathing or not responsive 1 each 2    nicotine (NICODERM CQ) 21 mg/24 hr Place 1 patch onto the skin once daily. 28 patch 2    ondansetron (ZOFRAN-ODT) 4 MG TbDL Take 1 tablet (4 mg total) by mouth every 6 (six) hours as needed (nausea). 20 tablet 0    oxyCODONE (ROXICODONE) 15 MG Tab Take 1 tablet (15 mg total) by mouth every 6 (six) hours as needed for Pain. 120 tablet 0    pantoprazole (PROTONIX) 40 MG tablet Take 40 mg by mouth.      senna (SENOKOT) 8.6 mg tablet Take 17.2 mg by mouth once daily.      spironolactone (ALDACTONE) 100 MG tablet Take 1 tablet (100 mg total) by mouth once daily. 30 tablet 5    tenofovir alafenamide (VEMLIDY) 25 mg Tab Take 1 tablet (25 mg total) by mouth once daily. 30 tablet 11     No  current facility-administered medications for this visit.       Review of Systems   Constitutional:  Negative for chills, fatigue, fever and unexpected weight change.   HENT:          Tissue floating in spittle   Respiratory:  Negative for cough and shortness of breath.    Cardiovascular:  Negative for chest pain and palpitations.   Gastrointestinal:  Positive for abdominal pain. Negative for constipation, diarrhea, nausea and vomiting.   Skin:  Negative for rash.   Neurological:  Negative for headaches.   Hematological:  Negative for adenopathy. Does not bruise/bleed easily.   Psychiatric/Behavioral:  Positive for hallucinations. Negative for suicidal ideas.         Denies homicidal ideation       ECOG Performance Status: 2   Objective:      Vitals:   There were no vitals filed for this visit.    Physical Exam  Constitutional:       General: She is not in acute distress.     Appearance: She is well-developed. She is not diaphoretic.   HENT:      Head: Normocephalic and atraumatic.   Cardiovascular:      Rate and Rhythm: Normal rate and regular rhythm.      Heart sounds: Normal heart sounds. No murmur heard.     No friction rub. No gallop.   Pulmonary:      Effort: Pulmonary effort is normal. No respiratory distress.      Breath sounds: Normal breath sounds. No wheezing or rales.   Chest:      Chest wall: No tenderness.   Abdominal:      General: Bowel sounds are normal. There is no distension.      Palpations: Abdomen is soft. There is no mass.      Tenderness: There is no abdominal tenderness. There is no guarding or rebound.   Lymphadenopathy:      Cervical: No cervical adenopathy.      Upper Body:      Right upper body: No supraclavicular adenopathy.      Left upper body: No supraclavicular adenopathy.   Skin:     Comments: Pt with multiple areas of excoriation on skin including right ear, chin, and arms   Neurological:      Mental Status: She is alert and oriented to person, place, and time.   Psychiatric:          Attention and Perception: She is inattentive.         Mood and Affect: Affect is labile and inappropriate.         Speech: She is communicative. Speech is slurred and tangential. Speech is not rapid and pressured.     Laboratory Data:  Lab Visit on 12/29/2023   Component Date Value Ref Range Status    WBC 12/29/2023 3.23 (L)  3.90 - 12.70 K/uL Final    RBC 12/29/2023 3.28 (L)  4.00 - 5.40 M/uL Final    Hemoglobin 12/29/2023 9.2 (L)  12.0 - 16.0 g/dL Final    Hematocrit 12/29/2023 29.2 (L)  37.0 - 48.5 % Final    MCV 12/29/2023 89  82 - 98 fL Final    MCH 12/29/2023 28.0  27.0 - 31.0 pg Final    MCHC 12/29/2023 31.5 (L)  32.0 - 36.0 g/dL Final    RDW 12/29/2023 16.8 (H)  11.5 - 14.5 % Final    Platelets 12/29/2023 114 (L)  150 - 450 K/uL Final    MPV 12/29/2023 9.4  9.2 - 12.9 fL Final    Immature Granulocytes 12/29/2023 0.0  0.0 - 0.5 % Final    Gran # (ANC) 12/29/2023 2.0  1.8 - 7.7 K/uL Final    Immature Grans (Abs) 12/29/2023 0.00  0.00 - 0.04 K/uL Final    Comment: Mild elevation in immature granulocytes is non specific and   can be seen in a variety of conditions including stress response,   acute inflammation, trauma and pregnancy. Correlation with other   laboratory and clinical findings is essential.      Lymph # 12/29/2023 0.8 (L)  1.0 - 4.8 K/uL Final    Mono # 12/29/2023 0.3  0.3 - 1.0 K/uL Final    Eos # 12/29/2023 0.2  0.0 - 0.5 K/uL Final    Baso # 12/29/2023 0.03  0.00 - 0.20 K/uL Final    nRBC 12/29/2023 0  0 /100 WBC Final    Gran % 12/29/2023 61.4  38.0 - 73.0 % Final    Lymph % 12/29/2023 25.4  18.0 - 48.0 % Final    Mono % 12/29/2023 7.7  4.0 - 15.0 % Final    Eosinophil % 12/29/2023 4.6  0.0 - 8.0 % Final    Basophil % 12/29/2023 0.9  0.0 - 1.9 % Final    Differential Method 12/29/2023 Automated   Final    Sodium 12/29/2023 137  136 - 145 mmol/L Final    Potassium 12/29/2023 6.2 (H)  3.5 - 5.1 mmol/L Final    Chloride 12/29/2023 108  95 - 110 mmol/L Final    CO2  12/29/2023 25  23 - 29 mmol/L Final    Glucose 12/29/2023 107  70 - 110 mg/dL Final    BUN 12/29/2023 36 (H)  6 - 20 mg/dL Final    Creatinine 12/29/2023 1.4  0.5 - 1.4 mg/dL Final    Calcium 12/29/2023 8.9  8.7 - 10.5 mg/dL Final    Total Protein 12/29/2023 6.5  6.0 - 8.4 g/dL Final    Albumin 12/29/2023 3.1 (L)  3.5 - 5.2 g/dL Final    Total Bilirubin 12/29/2023 0.3  0.1 - 1.0 mg/dL Final    Comment: For infants and newborns, interpretation of results should be based  on gestational age, weight and in agreement with clinical  observations.    Premature Infant recommended reference ranges:  Up to 24 hours.............<8.0 mg/dL  Up to 48 hours............<12.0 mg/dL  3-5 days..................<15.0 mg/dL  6-29 days.................<15.0 mg/dL      Alkaline Phosphatase 12/29/2023 115  55 - 135 U/L Final    AST 12/29/2023 20  10 - 40 U/L Final    ALT 12/29/2023 14  10 - 44 U/L Final    eGFR 12/29/2023 45.8 (A)  >60 mL/min/1.73 m^2 Final    Anion Gap 12/29/2023 4 (L)  8 - 16 mmol/L Final    Ferritin 12/29/2023 61  20.0 - 300.0 ng/mL Final    Iron 12/29/2023 59  30 - 160 ug/dL Final    Transferrin 12/29/2023 270  200 - 375 mg/dL Final    TIBC 12/29/2023 400  250 - 450 ug/dL Final    Saturated Iron 12/29/2023 15 (L)  20 - 50 % Final       Route Chart for Scheduling    Med Onc Chart Routing      Follow up with physician 1 month. PT needs a return appt in 1 month with CBC, CMP, STFR, ferritin and iron/TIBC the week prior to the appt.   Follow up with ELISHA    Infusion scheduling note    Injection scheduling note    Labs    Imaging    Pharmacy appointment    Other referrals              Supportive Plan Information  OP IRON SUCROSE IVPB TWICE WEEKLY   Oleg Woo MD   Upcoming Treatment Dates - OP IRON SUCROSE IVPB TWICE WEEKLY    12/6/2023       Medications       iron sucrose injection 200 mg  12/9/2023       Medications       iron sucrose injection 200 mg    Therapy Plan Information  IV Fluids  sodium  chloride 0.9% bolus 1,000 mL 1,000 mL  1,000 mL, Intravenous, Every visit  Flushes  sodium chloride 0.9% flush 10 mL  10 mL, Intravenous, PRN  heparin, porcine (PF) 100 unit/mL injection flush 500 Units  500 Units, Intravenous, PRN    Imaging:     Reviewed       Assessment:       No diagnosis found.         Plan:     Psychosis - pt with concern for psychosis given underlying Bipolar DO, possible hepatic encephalopathy and medication effect  -Pt with slurred speech, unsteady gait, tangential thinking and inappropriate responses to questions  -Concern is that the patient can not care for herself given multiple excoriations and breaks in the skin likely suffered from frequent falls at home  -Pt to be PEC'd and sent to [facility] for medical and psychiatric evaluation    Iron Deficiency Anemia - Labs on 10/03/23 showed a ferritin of 20ng/mL with TIBC elevated at 475mcg/dL  -Will have the patient scheduled for iron infusions after her acute psychosis is managed    Thrombocytopenia - likely due to hepatitis B and liver disease  -Will monitor    Oleg Woo MD  [facility]  Hematology and Oncology  [facility]   [address] [place] [address]   O: [phone]  F: [phone]

## 2024-01-05 NOTE — PROGRESS NOTES
PATIENT: Marie Damon  MRN: [id number]  DATE: 2024      Diagnosis:   1. Cirrhosis of liver without ascites, unspecified hepatic cirrhosis type    2. Pancytopenia    3. Hepatitis B surface antigen positive    4. Iron deficiency anemia due to chronic blood loss        Chief Complaint: Follow-up (Iron Deficiency Anemia)    Subjective:    Interval History:Ms. Damon is a 48 y.o. female with with HTN, Hepatitis B, thrombocytopenia, bipolar disorder who presents for iron deficiency anemia.  Since the last clinic visit the patient underwent labs on 23 showing ferritin of 61ng/mL with normal TIBC.  PT states she is scheduled for an EGD and colonoscopy on 24 with gastroenterology from [facility].  The patient denies CP, cough, SOB, abdominal pain, nausea, vomiting, constipation, diarrhea.  The patient denies fever, chills, night sweats, weight loss, new lumps or bumps, easy bruising or bleeding.    Prior History:  HTN, Hepatitis B, thrombocytopenia, bipolar disorder who presents for iron deficiency anemia.  Pt previously diagnosed with hepatitis B and is currently on vemlidy.  Pt has recently in the hospital for GI bleed. Labs on 10/03/23 showed a ferritin of 20ng/mL with TIBC elevated at 475mcg/dL.    Past Medical History:   Past Medical History:   Diagnosis Date    Bone spur     hip    HTN (hypertension)        Past Surgical HIstory:   Past Surgical History:   Procedure Laterality Date     SECTION, LOW TRANSVERSE      x2    CHOLECYSTECTOMY      HYSTERECTOMY         Family History: No family history on file.    Social History:  reports that she has been smoking cigarettes. She has been exposed to tobacco smoke. She has never used smokeless tobacco. She reports that she does not currently use alcohol. She reports that she does not currently use drugs.    Allergies:  Review of patient's allergies indicates:   Allergen Reactions    Pcn [penicillins] Hives    Morphine Other (See Comments)      heartburn    Bupropion hcl Other (See Comments)    Demerol [meperidine]        Medications:  Current Outpatient Medications   Medication Sig Dispense Refill    bisoprolol (ZEBETA) 5 MG tablet Take 1 tablet (5 mg total) by mouth once daily. 30 tablet 2    cariprazine (VRAYLAR) 1.5 mg Cap Take 1 capsule (1.5 mg total) by mouth Daily. 30 capsule 3    dicyclomine (BENTYL) 20 mg tablet Take 1 tablet (20 mg total) by mouth 4 (four) times daily. 120 tablet 3    doxycycline (VIBRAMYCIN) 100 MG Cap Take 1 capsule (100 mg total) by mouth 2 (two) times daily. 14 capsule 0    EScitalopram oxalate (LEXAPRO) 20 MG tablet Take 1 tablet (20 mg total) by mouth once daily. 30 tablet 11    furosemide (LASIX) 20 MG tablet Take 1 tablet (20 mg total) by mouth once daily. 30 tablet 5    gabapentin (NEURONTIN) 300 MG capsule Take 1 capsule (300 mg total) by mouth 2 (two) times daily. 60 capsule 2    GAVILYTE-G 236-22.74-6.74 -5.86 gram suspension Take by mouth.      ibuprofen (ADVIL,MOTRIN) 200 MG tablet Take 200 mg by mouth every 6 (six) hours as needed for Pain.      lisinopriL 10 MG tablet Take 1 tablet (10 mg total) by mouth once daily. 90 tablet 1    mupirocin calcium 2% (BACTROBAN) 2 % cream Apply topically 2 (two) times daily. 30 g 2    naloxone (NARCAN) 4 mg/actuation Spry each nostril if not breathing or not responsive 1 each 2    nicotine (NICODERM CQ) 21 mg/24 hr Place 1 patch onto the skin once daily. 28 patch 2    ondansetron (ZOFRAN-ODT) 4 MG TbDL Take 1 tablet (4 mg total) by mouth every 6 (six) hours as needed (nausea). 20 tablet 0    oxyCODONE (ROXICODONE) 15 MG Tab Take 1 tablet (15 mg total) by mouth every 6 (six) hours as needed for Pain. 120 tablet 0    pantoprazole (PROTONIX) 40 MG tablet Take 40 mg by mouth.      senna (SENOKOT) 8.6 mg tablet Take 17.2 mg by mouth once daily.      spironolactone (ALDACTONE) 100 MG tablet Take 1 tablet (100 mg total) by mouth once daily. 30 tablet 5    tenofovir alafenamide (VEMLIDY)  25 mg Tab Take 1 tablet (25 mg total) by mouth once daily. 30 tablet 11     No current facility-administered medications for this visit.       Review of Systems   Constitutional:  Negative for chills, fatigue, fever and unexpected weight change.   Respiratory:  Negative for cough and shortness of breath.    Cardiovascular:  Negative for chest pain and palpitations.   Gastrointestinal:  Negative for abdominal pain, constipation, diarrhea, nausea and vomiting.   Skin:  Negative for rash.   Neurological:  Negative for headaches.   Hematological:  Negative for adenopathy. Does not bruise/bleed easily.     ECOG Performance Status: 2   Objective:      Vitals:   Vitals:    01/04/24 1316   BP: 100/78   BP Location: Right arm   Patient Position: Sitting   BP Method: Medium (Manual)   Pulse: 82   Temp: 97.4 °F (36.3 °C)   TempSrc: Skin   SpO2: 98%   Weight: 88.1 kg (194 lb 3.6 oz)       Physical Exam  Constitutional:       General: She is not in acute distress.     Appearance: She is well-developed. She is not diaphoretic.   HENT:      Head: Normocephalic and atraumatic.   Cardiovascular:      Rate and Rhythm: Normal rate and regular rhythm.      Heart sounds: Normal heart sounds. No murmur heard.     No friction rub. No gallop.   Pulmonary:      Effort: Pulmonary effort is normal. No respiratory distress.      Breath sounds: Normal breath sounds. No wheezing or rales.   Chest:      Chest wall: No tenderness.   Abdominal:      General: Bowel sounds are normal. There is no distension.      Palpations: Abdomen is soft. There is no mass.      Tenderness: There is no abdominal tenderness. There is no guarding or rebound.   Lymphadenopathy:      Cervical: No cervical adenopathy.      Upper Body:      Right upper body: No supraclavicular adenopathy.      Left upper body: No supraclavicular adenopathy.   Skin:     Comments: Pt with multiple areas of excoriation on skin including right ear, chin, and arms   Neurological:      Mental  Status: She is alert and oriented to person, place, and time.   Psychiatric:         Attention and Perception: She is inattentive.         Mood and Affect: Affect is labile and inappropriate.         Speech: She is communicative. Speech is slurred and tangential. Speech is not rapid and pressured.     Laboratory Data:  Lab Visit on 12/29/2023   Component Date Value Ref Range Status    WBC 12/29/2023 3.23 (L)  3.90 - 12.70 K/uL Final    RBC 12/29/2023 3.28 (L)  4.00 - 5.40 M/uL Final    Hemoglobin 12/29/2023 9.2 (L)  12.0 - 16.0 g/dL Final    Hematocrit 12/29/2023 29.2 (L)  37.0 - 48.5 % Final    MCV 12/29/2023 89  82 - 98 fL Final    MCH 12/29/2023 28.0  27.0 - 31.0 pg Final    MCHC 12/29/2023 31.5 (L)  32.0 - 36.0 g/dL Final    RDW 12/29/2023 16.8 (H)  11.5 - 14.5 % Final    Platelets 12/29/2023 114 (L)  150 - 450 K/uL Final    MPV 12/29/2023 9.4  9.2 - 12.9 fL Final    Immature Granulocytes 12/29/2023 0.0  0.0 - 0.5 % Final    Gran # (ANC) 12/29/2023 2.0  1.8 - 7.7 K/uL Final    Immature Grans (Abs) 12/29/2023 0.00  0.00 - 0.04 K/uL Final    Comment: Mild elevation in immature granulocytes is non specific and   can be seen in a variety of conditions including stress response,   acute inflammation, trauma and pregnancy. Correlation with other   laboratory and clinical findings is essential.      Lymph # 12/29/2023 0.8 (L)  1.0 - 4.8 K/uL Final    Mono # 12/29/2023 0.3  0.3 - 1.0 K/uL Final    Eos # 12/29/2023 0.2  0.0 - 0.5 K/uL Final    Baso # 12/29/2023 0.03  0.00 - 0.20 K/uL Final    nRBC 12/29/2023 0  0 /100 WBC Final    Gran % 12/29/2023 61.4  38.0 - 73.0 % Final    Lymph % 12/29/2023 25.4  18.0 - 48.0 % Final    Mono % 12/29/2023 7.7  4.0 - 15.0 % Final    Eosinophil % 12/29/2023 4.6  0.0 - 8.0 % Final    Basophil % 12/29/2023 0.9  0.0 - 1.9 % Final    Differential Method 12/29/2023 Automated   Final    Sodium 12/29/2023 137  136 - 145 mmol/L Final    Potassium 12/29/2023 6.2 (H)  3.5 - 5.1 mmol/L Final     Chloride 12/29/2023 108  95 - 110 mmol/L Final    CO2 12/29/2023 25  23 - 29 mmol/L Final    Glucose 12/29/2023 107  70 - 110 mg/dL Final    BUN 12/29/2023 36 (H)  6 - 20 mg/dL Final    Creatinine 12/29/2023 1.4  0.5 - 1.4 mg/dL Final    Calcium 12/29/2023 8.9  8.7 - 10.5 mg/dL Final    Total Protein 12/29/2023 6.5  6.0 - 8.4 g/dL Final    Albumin 12/29/2023 3.1 (L)  3.5 - 5.2 g/dL Final    Total Bilirubin 12/29/2023 0.3  0.1 - 1.0 mg/dL Final    Comment: For infants and newborns, interpretation of results should be based  on gestational age, weight and in agreement with clinical  observations.    Premature Infant recommended reference ranges:  Up to 24 hours.............<8.0 mg/dL  Up to 48 hours............<12.0 mg/dL  3-5 days..................<15.0 mg/dL  6-29 days.................<15.0 mg/dL      Alkaline Phosphatase 12/29/2023 115  55 - 135 U/L Final    AST 12/29/2023 20  10 - 40 U/L Final    ALT 12/29/2023 14  10 - 44 U/L Final    eGFR 12/29/2023 45.8 (A)  >60 mL/min/1.73 m^2 Final    Anion Gap 12/29/2023 4 (L)  8 - 16 mmol/L Final    Ferritin 12/29/2023 61  20.0 - 300.0 ng/mL Final    Iron 12/29/2023 59  30 - 160 ug/dL Final    Transferrin 12/29/2023 270  200 - 375 mg/dL Final    TIBC 12/29/2023 400  250 - 450 ug/dL Final    Saturated Iron 12/29/2023 15 (L)  20 - 50 % Final         Imaging:     US Abdomen 10/17/23    The liver is echodense. No focal liver abnormality. Post cholecystectomy. The common bile duct measures 13.2 mm in diameter. The portal vein exhibits hepatopedal flow. The hepatic veins exhibit hepatofugal flow. The spleen is 21.9 cm in long   axis.        Assessment:       1. Cirrhosis of liver without ascites, unspecified hepatic cirrhosis type    2. Pancytopenia    3. Hepatitis B surface antigen positive    4. Iron deficiency anemia due to chronic blood loss           Plan:     Cirrhosis - Pt with hepatosplenomegaly from her underlying cirrhosis which is likely due to chronic hepatitis  B  -Concern prior psychosis may have been from underlying liver disease  -PT following with hepatology    Pancytopenia - likely due to cirrhosis and splenomegaly  -WBC 3.23k, hemoglobin 9.2g/dL and platelets 114k on 12/29/23  -Will monitor    Iron Deficiency Anemia - Labs on 10/03/23 showed a ferritin of 20ng/mL with TIBC elevated at 475mcg/dL  -Repeat iron studies 12/29/23 show improvement  -Will hold off on iron infusions  -Will repeat in 1 month  -PT to have EGD and colonoscopy with Gastroenterology from [facility] 1/11/24    Hepatitis B - management per hepatology    Route Chart for Scheduling    Med Onc Chart Routing      Follow up with physician 1 month. PT needs a return appt in 1 month with CBC, CMP, STFR, ferritin and iron/TIBC the week prior to the appt.   Follow up with ELISHA    Infusion scheduling note    Injection scheduling note    Labs    Imaging    Pharmacy appointment    Other referrals              Supportive Plan Information  OP IRON SUCROSE IVPB TWICE WEEKLY   Oleg Woo MD   Upcoming Treatment Dates - OP IRON SUCROSE IVPB TWICE WEEKLY    12/6/2023       Medications       iron sucrose injection 200 mg  12/9/2023       Medications       iron sucrose injection 200 mg    Therapy Plan Information  IV Fluids  sodium chloride 0.9% bolus 1,000 mL 1,000 mL  1,000 mL, Intravenous, Every visit  Flushes  sodium chloride 0.9% flush 10 mL  10 mL, Intravenous, PRN  heparin, porcine (PF) 100 unit/mL injection flush 500 Units  500 Units, Intravenous, PRN      Oleg Woo MD  [facility]  Hematology and Oncology  [facility] [address] [place] [address]   O: [phone]  F: [phone]

## 2024-01-29 DIAGNOSIS — G89.4 CHRONIC PAIN SYNDROME: ICD-10-CM

## 2024-01-29 DIAGNOSIS — F41.9 ANXIETY: Primary | ICD-10-CM

## 2024-01-29 RX ORDER — OXYCODONE HYDROCHLORIDE 15 MG/1
15 TABLET ORAL EVERY 6 HOURS PRN
Qty: 120 TABLET | Refills: 0 | Status: SHIPPED | OUTPATIENT
Start: 2024-01-29 | End: 2024-03-18

## 2024-01-29 RX ORDER — DIAZEPAM 10 MG/1
10 TABLET ORAL 3 TIMES DAILY
Qty: 90 TABLET | Refills: 0 | Status: SHIPPED | OUTPATIENT
Start: 2024-01-29 | End: 2024-02-05 | Stop reason: SDUPTHER

## 2024-01-29 RX ORDER — GABAPENTIN 300 MG/1
300 CAPSULE ORAL 2 TIMES DAILY
Qty: 60 CAPSULE | Refills: 2 | Status: SHIPPED | OUTPATIENT
Start: 2024-01-29 | End: 2024-03-18
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
## 2024-01-29 NOTE — TELEPHONE ENCOUNTER
----- Message from Trista Crespo sent at 1/29/2024 10:43 AM CST -----  Refill for Bisacodyl, Sutab, Oxycodone, Gabapentin, diazepam. Sanam. Pt needs these asap. Pt needs to speak with the nurse. Pt #[phone]

## 2024-01-29 NOTE — TELEPHONE ENCOUNTER
The patient's prescription has been approved and sent to   [facility] [address]  [address] [facility] [address]  Phone: [phone] Fax: [phone]
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
## 2024-01-31 ENCOUNTER — TELEPHONE (OUTPATIENT)
Dept: FAMILY MEDICINE | Facility: CLINIC | Age: 49
End: 2024-01-31

## 2024-01-31 DIAGNOSIS — B18.1 CHRONIC VIRAL HEPATITIS B WITHOUT DELTA AGENT AND WITHOUT COMA: ICD-10-CM

## 2024-01-31 NOTE — LETTER
[address] [facility] [address]  [place] [address]  Phone: [phone]   [phone]                        MD Marleen Pino MD Chequita Williams, MD Matthew Bassett, PA-C Allison Hoffritz, JOSE LUIS Madsen, JOSE LUIS Mobley, JOSE LUIS      Date: 01/31/2024        Patient: Marie Damon  YOB: 1975      Please fax discharge summary.         Sincerely,     Carmen Nielsen LPN    Electronically Signed By: Marleen Sun MD

## 2024-01-31 NOTE — TELEPHONE ENCOUNTER
Spoke with pt. Appt r/s.   Records requested again from [facility]. Fax number: [phone]

## 2024-01-31 NOTE — TELEPHONE ENCOUNTER
----- Message from Faby Del Cid sent at 1/31/2024  9:52 AM CST -----  Pt is crying stating that medicaid won't bring her today to her appt. She called the hotline and the automated system told her to call a cab because she does not have ride set up. She needs her medication     [phone]

## 2024-01-31 NOTE — TELEPHONE ENCOUNTER
----- Message from Tatum Garcia sent at 1/31/2024 10:55 AM CST -----  The patient has an appointment at 11:20  today. She is trying to get a ride. Can she come in anytime she  can today. She has to call the Medicaid office in a 1/2 hour to see if they can get her here today. pt's # [phone]

## 2024-02-01 RX ORDER — TENOFOVIR ALAFENAMIDE 25 MG/1
25 TABLET ORAL DAILY
Qty: 30 TABLET | Refills: 11 | Status: ACTIVE | OUTPATIENT
Start: 2024-02-01

## 2024-02-05 DIAGNOSIS — F41.9 ANXIETY: ICD-10-CM

## 2024-02-05 RX ORDER — DIAZEPAM 10 MG/1
10 TABLET ORAL 3 TIMES DAILY
Qty: 90 TABLET | Refills: 0 | Status: SHIPPED | OUTPATIENT
Start: 2024-02-05 | End: 2024-03-18

## 2024-02-05 NOTE — TELEPHONE ENCOUNTER
The patient's prescription has been approved and sent to   [facility] [address]  [address] [facility] [address]  Phone: [phone] Fax: [phone]

## 2024-02-05 NOTE — TELEPHONE ENCOUNTER
Spoke with Sarmad for pt PA.   States she already has a PA. She does not need PA at this time.     Called [facility] pharmacy- [facility]  states her rx is showing no refills advised it does not have refills, but she should get her rx sent on 1/29/24. Her rx is due. Taz states she were changing to a new system on 1/29/24, so rx were lost in the process. States she just needs a new rx sent.

## 2024-02-05 NOTE — TELEPHONE ENCOUNTER
----- Message from Torie Perkins LPN sent at 2/5/2024  9:11 AM CST -----    ----- Message -----  From: Faby Del Cid  Sent: 2/5/2024   9:08 AM CST  To: Trista Madsen Staff    Pt is calling about the pa on her valium   [phone]

## 2024-02-14 ENCOUNTER — TELEPHONE (OUTPATIENT)
Dept: FAMILY MEDICINE | Facility: CLINIC | Age: 49
End: 2024-02-14
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
## 2024-02-22 ENCOUNTER — LAB VISIT (OUTPATIENT)
Dept: LAB | Facility: HOSPITAL | Age: 49
End: 2024-02-22
Attending: INTERNAL MEDICINE
Payer: MEDICAID

## 2024-02-22 DIAGNOSIS — D61.818 PANCYTOPENIA: ICD-10-CM

## 2024-02-22 LAB
ALBUMIN SERPL BCP-MCNC: 3.4 G/DL (ref 3.5–5.2)
ALP SERPL-CCNC: 292 U/L (ref 55–135)
ALT SERPL W/O P-5'-P-CCNC: 22 U/L (ref 10–44)
ANION GAP SERPL CALC-SCNC: 10 MMOL/L (ref 8–16)
AST SERPL-CCNC: 19 U/L (ref 10–40)
BASOPHILS # BLD AUTO: 0.02 K/UL (ref 0–0.2)
BASOPHILS NFR BLD: 0.9 % (ref 0–1.9)
BILIRUB SERPL-MCNC: 0.7 MG/DL (ref 0.1–1)
BUN SERPL-MCNC: 17 MG/DL (ref 6–20)
CALCIUM SERPL-MCNC: 9.1 MG/DL (ref 8.7–10.5)
CHLORIDE SERPL-SCNC: 105 MMOL/L (ref 95–110)
CO2 SERPL-SCNC: 25 MMOL/L (ref 23–29)
CREAT SERPL-MCNC: 0.9 MG/DL (ref 0.5–1.4)
DIFFERENTIAL METHOD BLD: ABNORMAL
EOSINOPHIL # BLD AUTO: 0.1 K/UL (ref 0–0.5)
EOSINOPHIL NFR BLD: 5.3 % (ref 0–8)
ERYTHROCYTE [DISTWIDTH] IN BLOOD BY AUTOMATED COUNT: 14.9 % (ref 11.5–14.5)
EST. GFR  (NO RACE VARIABLE): >60 ML/MIN/1.73 M^2
FERRITIN SERPL-MCNC: 26 NG/ML (ref 20–300)
GLUCOSE SERPL-MCNC: 103 MG/DL (ref 70–110)
HCT VFR BLD AUTO: 30 % (ref 37–48.5)
HGB BLD-MCNC: 9.5 G/DL (ref 12–16)
IMM GRANULOCYTES # BLD AUTO: 0 K/UL (ref 0–0.04)
IMM GRANULOCYTES NFR BLD AUTO: 0 % (ref 0–0.5)
IRON SERPL-MCNC: 26 UG/DL (ref 30–160)
LYMPHOCYTES # BLD AUTO: 0.6 K/UL (ref 1–4.8)
LYMPHOCYTES NFR BLD: 27.6 % (ref 18–48)
MCH RBC QN AUTO: 28.2 PG (ref 27–31)
MCHC RBC AUTO-ENTMCNC: 31.7 G/DL (ref 32–36)
MCV RBC AUTO: 89 FL (ref 82–98)
MONOCYTES # BLD AUTO: 0.2 K/UL (ref 0.3–1)
MONOCYTES NFR BLD: 8.8 % (ref 4–15)
NEUTROPHILS # BLD AUTO: 1.3 K/UL (ref 1.8–7.7)
NEUTROPHILS NFR BLD: 57.4 % (ref 38–73)
NRBC BLD-RTO: 0 /100 WBC
PLATELET # BLD AUTO: 60 K/UL (ref 150–450)
PMV BLD AUTO: 10.7 FL (ref 9.2–12.9)
POTASSIUM SERPL-SCNC: 4.3 MMOL/L (ref 3.5–5.1)
PROT SERPL-MCNC: 6.8 G/DL (ref 6–8.4)
RBC # BLD AUTO: 3.37 M/UL (ref 4–5.4)
SATURATED IRON: 5 % (ref 20–50)
SODIUM SERPL-SCNC: 140 MMOL/L (ref 136–145)
TOTAL IRON BINDING CAPACITY: 478 UG/DL (ref 250–450)
TRANSFERRIN SERPL-MCNC: 323 MG/DL (ref 200–375)
WBC # BLD AUTO: 2.28 K/UL (ref 3.9–12.7)

## 2024-02-22 PROCEDURE — 80053 COMPREHEN METABOLIC PANEL: CPT | Mod: PN | Performed by: INTERNAL MEDICINE

## 2024-02-22 PROCEDURE — 36415 COLL VENOUS BLD VENIPUNCTURE: CPT | Mod: PN | Performed by: INTERNAL MEDICINE

## 2024-02-22 PROCEDURE — 84238 ASSAY NONENDOCRINE RECEPTOR: CPT | Performed by: INTERNAL MEDICINE

## 2024-02-22 PROCEDURE — 82728 ASSAY OF FERRITIN: CPT | Performed by: INTERNAL MEDICINE

## 2024-02-22 PROCEDURE — 83540 ASSAY OF IRON: CPT | Performed by: INTERNAL MEDICINE

## 2024-02-22 PROCEDURE — 85025 COMPLETE CBC W/AUTO DIFF WBC: CPT | Mod: PN | Performed by: INTERNAL MEDICINE
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
## 2024-02-24 LAB — STFR SERPL-MCNC: 7.2 MG/L (ref 1.8–4.6)

## 2024-02-25 NOTE — PROGRESS NOTES
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   Constitutional:       General: She is not in acute distress.     Appearance: Normal appearance. She is well-developed.      Comments: overweight   HENT:      Head: Normocephalic and atraumatic.   Neck:      Thyroid: No thyromegaly.   Cardiovascular:      Rate and Rhythm: Normal rate and regular rhythm.      Heart sounds: No murmur heard.     No friction rub.      Comments: Bilateral lower extremity varicose veins  Pulmonary:      Effort: Pulmonary effort is normal.      Breath sounds: Normal breath sounds. No wheezing or rales.   Abdominal:      General: There is no distension.      Palpations: Abdomen is soft.      Tenderness: There is no abdominal tenderness. There is no guarding or rebound.   Musculoskeletal:      Cervical back: Neck supple.      Right lower leg: Edema (trace) present.      Left lower leg: Edema (trace) present.   Lymphadenopathy:      Cervical: No cervical adenopathy.   Skin:     General: Skin is warm and dry.      Findings: Rash (lower extermity, back of left leg) present.   Neurological:      Mental Status: She is alert and oriented to person, place, and time.   Psychiatric:         Attention and Perception: Attention normal. She does not perceive auditory hallucinations.         Mood and Affect: Mood is not depressed. Affect is not tearful.         Speech: Speech normal.         Behavior: Behavior is " cooperative.         Thought Content: Thought content normal. Thought content is not paranoid. Thought content does not include homicidal or suicidal ideation.         Cognition and Memory: Cognition is not impaired.         Judgment: Judgment normal.           Assessment:       1. Essential hypertension    2. Iron deficiency anemia due to chronic blood loss    3. Cirrhosis of liver without ascites, unspecified hepatic cirrhosis type    4. Bipolar affective disorder, currently depressed, moderate    5. Chronic pain syndrome    6. Family problems    7. Chronic pain syndrome    8. Nausea                 Plan:       Essential hypertension  Comments:  Controlled. Will continue to monitor BP on current medication regimen    Iron deficiency anemia due to chronic blood loss  Comments:  STable. To continue to f/w Heme/Onc    Cirrhosis of liver without ascites, unspecified hepatic cirrhosis type  Comments:  Controlled. Will continue to monitor    Bipolar affective disorder, currently depressed, moderate  Comments:  Uncontrolled. Pt encouraged to take risperdal daily    Chronic pain syndrome  Comments:  Pt with    Family problems    Chronic pain syndrome  Comments:  Pain controlled. Will continue to monitor the pain control and function    Nausea  -     ondansetron disintegrating tablet 4 mg  -     ondansetron (ZOFRAN-ODT) 4 MG TbDL; Take 1 tablet (4 mg total) by mouth every 6 (six) hours as needed (nausea).  Dispense: 20 tablet; Refill: 0        Chronic Pain Notes:  Have discussed the risks and benefits of chronic narcotic therapy including pain control, dependence, and addiction.  The patient is aware and accepts the risks and benefits. Patient is aware of the risk of taking narcotics combined with benzodiazepines/muscle relaxers/hypnotics, including but not limited to breathing difficulties, coma, and death; accepts the risks; and agrees to use medications only as prescribed.Patient instructed to lock narcotics up if  children, elderly, pets, and the naive may be in or visit the home and they should have no access to narcotics, as 1 dose could be fatal.      Follow up in about 3 months (around 5/26/2024) for HTN, Bipolar, cirrhosis, Bipolar, Chronic Pain.        2/26/2024 Marleen Sun

## 2024-02-26 ENCOUNTER — OFFICE VISIT (OUTPATIENT)
Dept: FAMILY MEDICINE | Facility: CLINIC | Age: 49
End: 2024-02-26
Payer: MEDICAID

## 2024-02-26 VITALS
SYSTOLIC BLOOD PRESSURE: 130 MMHG | HEART RATE: 70 BPM | DIASTOLIC BLOOD PRESSURE: 80 MMHG | WEIGHT: 201 LBS | BODY MASS INDEX: 28.14 KG/M2 | HEIGHT: 71 IN

## 2024-02-26 DIAGNOSIS — K74.60 CIRRHOSIS OF LIVER WITHOUT ASCITES, UNSPECIFIED HEPATIC CIRRHOSIS TYPE: ICD-10-CM

## 2024-02-26 DIAGNOSIS — G89.4 CHRONIC PAIN SYNDROME: ICD-10-CM

## 2024-02-26 DIAGNOSIS — I10 ESSENTIAL HYPERTENSION: Primary | ICD-10-CM

## 2024-02-26 DIAGNOSIS — Z63.9 FAMILY PROBLEMS: ICD-10-CM

## 2024-02-26 DIAGNOSIS — R11.0 NAUSEA: ICD-10-CM

## 2024-02-26 DIAGNOSIS — D50.0 IRON DEFICIENCY ANEMIA DUE TO CHRONIC BLOOD LOSS: ICD-10-CM

## 2024-02-26 DIAGNOSIS — F31.32 BIPOLAR AFFECTIVE DISORDER, CURRENTLY DEPRESSED, MODERATE: ICD-10-CM

## 2024-02-26 PROCEDURE — 1160F RVW MEDS BY RX/DR IN RCRD: CPT | Mod: CPTII,S$GLB,, | Performed by: FAMILY MEDICINE

## 2024-02-26 PROCEDURE — 3008F BODY MASS INDEX DOCD: CPT | Mod: CPTII,S$GLB,, | Performed by: FAMILY MEDICINE

## 2024-02-26 PROCEDURE — 1159F MED LIST DOCD IN RCRD: CPT | Mod: CPTII,S$GLB,, | Performed by: FAMILY MEDICINE

## 2024-02-26 PROCEDURE — 3075F SYST BP GE 130 - 139MM HG: CPT | Mod: CPTII,S$GLB,, | Performed by: FAMILY MEDICINE

## 2024-02-26 PROCEDURE — 99214 OFFICE O/P EST MOD 30 MIN: CPT | Mod: S$GLB,,, | Performed by: FAMILY MEDICINE

## 2024-02-26 PROCEDURE — 3079F DIAST BP 80-89 MM HG: CPT | Mod: CPTII,S$GLB,, | Performed by: FAMILY MEDICINE

## 2024-02-26 RX ORDER — ONDANSETRON 4 MG/1
4 TABLET, ORALLY DISINTEGRATING ORAL EVERY 6 HOURS PRN
Qty: 20 TABLET | Refills: 0 | Status: SHIPPED | OUTPATIENT
Start: 2024-02-26

## 2024-02-26 RX ORDER — PROMETHAZINE HYDROCHLORIDE 25 MG/ML
25 INJECTION, SOLUTION INTRAMUSCULAR; INTRAVENOUS ONCE
Status: CANCELLED | OUTPATIENT
Start: 2024-02-26 | End: 2024-02-26

## 2024-02-26 RX ORDER — ONDANSETRON 4 MG/1
4 TABLET, ORALLY DISINTEGRATING ORAL
Status: SHIPPED | OUTPATIENT
Start: 2024-02-26

## 2024-02-26 SDOH — SOCIAL DETERMINANTS OF HEALTH (SDOH): PROBLEM RELATED TO PRIMARY SUPPORT GROUP, UNSPECIFIED: Z63.9

## 2024-02-29 ENCOUNTER — TELEPHONE (OUTPATIENT)
Dept: FAMILY MEDICINE | Facility: CLINIC | Age: 49
End: 2024-02-29
Payer: MEDICAID

## 2024-02-29 DIAGNOSIS — G89.4 CHRONIC PAIN SYNDROME: ICD-10-CM

## 2024-02-29 RX ORDER — OXYCODONE HYDROCHLORIDE 15 MG/1
15 TABLET ORAL EVERY 6 HOURS PRN
Qty: 120 TABLET | Refills: 0 | Status: CANCELLED | OUTPATIENT
Start: 2024-02-29

## 2024-02-29 NOTE — TELEPHONE ENCOUNTER
----- Message from Lindsey Rothman MA sent at 2/29/2024 12:29 PM CST -----  Patient is calling requesting a refill on pain medication    [facility]  Pt: [phone]

## 2024-02-29 NOTE — TELEPHONE ENCOUNTER
----- Message from Tatum Garcia sent at 2/29/2024  9:47 AM CST -----  Refill O[facility]. Can you please fill this ASAP. Her sister is going to pick this up at[address] today. That is the  only way to  her prescriptions. Please call and let her know when the prescription is sent over. Pt's # [phone]

## 2024-02-29 NOTE — TELEPHONE ENCOUNTER
As mentioned at her appt, I no longer feel comfortable giving pt controlled substances as her meds continue to be stolen and she has not made a good effort to get them properly locked up.  It has been explained to her in person at her last visit

## 2024-02-29 NOTE — TELEPHONE ENCOUNTER
Spoke with pt explained this to her. Pt is upset and states if this is  the case then Dr. Sun is no longer her PCP anymore. Clarified with pt are you firing Dr. Sun as your primary care pt states yes I will no longer see Dr. Sun if she is not filling my pain medications. .

## 2024-03-01 ENCOUNTER — TELEPHONE (OUTPATIENT)
Dept: FAMILY MEDICINE | Facility: CLINIC | Age: 49
End: 2024-03-01
Payer: MEDICAID

## 2024-03-01 NOTE — TELEPHONE ENCOUNTER
----- Message from Cristal Sawyer sent at 3/1/2024  8:36 AM CST -----  Pt stated that Dr. Sun was cutting off her pain medication if did not purchase a safe. Pt purchased a safe yesterday and would like to continue her care with Dr. Sun.    [phone]

## 2024-03-01 NOTE — TELEPHONE ENCOUNTER
Explained to pt that Dr. Sun does not feel comfortable filling her pain medication further due to how often it has been stolen. Advised pt that yesterday when she was notified of this she was very upset and fired Dr. Sun as her primary care. Advised pt we will no longer fill her pain medications and she will need to find a new primary care. Pt voiced understanding.

## 2024-03-04 ENCOUNTER — TELEPHONE (OUTPATIENT)
Dept: HEPATOLOGY | Facility: CLINIC | Age: 49
End: 2024-03-04
Payer: MEDICAID

## 2024-03-04 NOTE — TELEPHONE ENCOUNTER
"----- Message from Jin Carias sent at 3/4/2024  1:31 PM CST -----  Consult/Advisory:      Name Of Caller: Self      Contact Preference?: [phone]       What is the nature of the call?: Returning call to office      Additional Notes:  "Thank you for all that you do for our patients"         "

## 2024-03-04 NOTE — TELEPHONE ENCOUNTER
Spoke with patient. Informing patient to reach out to GI or her PCP also informed patient to go to the nearest ER for pain being 10/10. Patient verbalized understanding

## 2024-03-04 NOTE — TELEPHONE ENCOUNTER
"----- Message from Jin Carias sent at 3/4/2024  9:24 AM CST -----  Scheduling Request        Patient Status: Est      Scheduling Appt: Intense stomach swelling; Intense pain (level 10 out of 10);  Diarrhea       Time/Date Preference:  3/7 or 3/8      Contact Preference?: [phone]      Treating Provider: Marlon      Do you feel you need to be seen today? No      Additional Notes: Stating the matter is urgent because her stomach is also "hard as a rock" and she "looks like she's about to have 3 babies"    "Thank you for all that you do for our patients"       "

## 2024-03-18 PROBLEM — R14.0 ABDOMINAL DISTENTION: Status: ACTIVE | Noted: 2023-12-11

## 2024-03-18 PROBLEM — K92.2 LOWER GI BLEED: Status: ACTIVE | Noted: 2023-10-16

## 2024-03-18 PROBLEM — R14.3 FLATULENCE: Status: ACTIVE | Noted: 2023-12-11

## 2024-03-18 PROBLEM — F32.9 MAJOR DEPRESSIVE DISORDER: Status: ACTIVE | Noted: 2023-10-18

## 2024-03-18 PROBLEM — R10.9 ABDOMINAL PAIN: Status: ACTIVE | Noted: 2023-12-11

## 2024-03-18 PROBLEM — D61.818 PANCYTOPENIA: Status: ACTIVE | Noted: 2023-10-17

## 2024-03-18 PROBLEM — R19.8 ALTERNATING CONSTIPATION AND DIARRHEA: Status: ACTIVE | Noted: 2023-12-11

## 2024-03-18 PROBLEM — K21.9 GASTROESOPHAGEAL REFLUX DISEASE: Status: ACTIVE | Noted: 2023-12-11

## 2024-03-19 ENCOUNTER — TELEPHONE (OUTPATIENT)
Dept: FAMILY MEDICINE | Facility: CLINIC | Age: 49
End: 2024-03-19
Payer: MEDICAID

## 2024-03-19 NOTE — TELEPHONE ENCOUNTER
----- Message from Lindsey Rothman MA sent at 3/11/2024  2:12 PM CDT -----    ----- Message -----  From: Suzie Martinez  Sent: 3/11/2024   1:39 PM CDT  To: Marleen Vee    PRIOR AUTH.

## 2024-03-25 ENCOUNTER — TELEPHONE (OUTPATIENT)
Dept: HEMATOLOGY/ONCOLOGY | Facility: CLINIC | Age: 49
End: 2024-03-25
Payer: MEDICAID

## 2024-03-25 NOTE — TELEPHONE ENCOUNTER
----- Message from Oleg Woo MD sent at 3/23/2024  2:23 PM CDT -----  Pt needs an appt with me with CBC, CMP, Ferritin, Iron.TIBC and STFR the week before her appt.

## 2024-05-23 ENCOUNTER — TELEPHONE (OUTPATIENT)
Dept: FAMILY MEDICINE | Facility: CLINIC | Age: 49
End: 2024-05-23
Payer: MEDICAID

## 2024-05-23 NOTE — TELEPHONE ENCOUNTER
----- Message from Tesha Blake sent at 5/23/2024  8:41 AM CDT -----  Mother robert calling about pt being dropped and stopped taking the pain pills. Pt is going to out patient rehab 6 days a week . Pt is not the best person in the world but better than what she used to be. Mother is thanking Dr. Sun for letting go of her daughter so that she is able to get some help.   [phone]

## 2024-06-17 PROBLEM — K92.2 LOWER GI BLEED: Status: RESOLVED | Noted: 2023-10-16 | Resolved: 2024-06-17

## 2025-01-28 ENCOUNTER — TELEPHONE (OUTPATIENT)
Dept: HEPATOLOGY | Facility: CLINIC | Age: 50
End: 2025-01-28
Payer: MEDICAID

## 2025-01-28 NOTE — TELEPHONE ENCOUNTER
----- Message from Juana sent at 1/28/2025  1:36 PM CST -----  Regarding: Medication Refill  Contact: Patient  Type:  RX Refill Request    Who Called: Patient   Refill or New Rx: Refill   RX Name and Strength:tenofovir alafenamide (VEMLIDY) 25 mg Tab  How is the patient currently taking it? (ex. 1XDay):Take 1 tablet (25 mg total) by mouth once daily. - Oral  Is this a 30 day or 90 day RX: 30 day   Preferred Pharmacy with phone number:[facility] Phone:[phone]  Local or Mail Order: Mail   Ordering Provider:Marlon   Would the patient rather a call back or a response via [facility]? Call back   Best Call Back Number: [phone]   Additional Information: Patient called in to request refill on medication please assist

## 2025-02-04 DIAGNOSIS — B18.1 CHRONIC VIRAL HEPATITIS B WITHOUT DELTA AGENT AND WITHOUT COMA: ICD-10-CM

## 2025-02-04 RX ORDER — TENOFOVIR ALAFENAMIDE 25 MG/1
25 TABLET ORAL DAILY
Qty: 30 TABLET | Refills: 11 | Status: SHIPPED | OUTPATIENT
Start: 2025-02-04 | End: 2025-02-04 | Stop reason: SDUPTHER

## 2025-02-04 NOTE — TELEPHONE ENCOUNTER
"----- Message from Martell sent at 2/4/2025  1:44 PM CST -----  Regarding: RX refill    RX Name and Strength:tenofovir alafenamide (VEMLIDY) 25 mg Tab pt states she been out for over a week            Preferred Pharmacy with phone number:[facility]       Ordering Provider:Dr Mason         Contact Preference:[phone]        Additional Information:  "Thank you for all that you do for our patients"  "

## 2025-02-05 RX ORDER — TENOFOVIR ALAFENAMIDE 25 MG/1
25 TABLET ORAL DAILY
Qty: 30 TABLET | Refills: 11 | Status: ACTIVE | OUTPATIENT
Start: 2025-02-05

## 2025-03-05 ENCOUNTER — TELEPHONE (OUTPATIENT)
Dept: HEPATOLOGY | Facility: CLINIC | Age: 50
End: 2025-03-05
Payer: MEDICAID

## 2025-03-05 NOTE — TELEPHONE ENCOUNTER
----- Message from Callum Mason MD sent at 3/4/2025  7:20 PM CST -----  Can she come in for a clinic visit sometime soon?